# Patient Record
Sex: FEMALE | Race: WHITE | Employment: FULL TIME | URBAN - METROPOLITAN AREA
[De-identification: names, ages, dates, MRNs, and addresses within clinical notes are randomized per-mention and may not be internally consistent; named-entity substitution may affect disease eponyms.]

---

## 2017-03-01 ENCOUNTER — OFFICE VISIT (OUTPATIENT)
Dept: FAMILY MEDICINE | Facility: CLINIC | Age: 47
End: 2017-03-01
Payer: COMMERCIAL

## 2017-03-01 VITALS
SYSTOLIC BLOOD PRESSURE: 105 MMHG | TEMPERATURE: 98.4 F | DIASTOLIC BLOOD PRESSURE: 69 MMHG | OXYGEN SATURATION: 99 % | BODY MASS INDEX: 21.56 KG/M2 | HEART RATE: 88 BPM | WEIGHT: 133.6 LBS

## 2017-03-01 DIAGNOSIS — R07.0 THROAT PAIN: Primary | ICD-10-CM

## 2017-03-01 DIAGNOSIS — J06.9 VIRAL UPPER RESPIRATORY TRACT INFECTION: ICD-10-CM

## 2017-03-01 LAB
DEPRECATED S PYO AG THROAT QL EIA: NORMAL
MICRO REPORT STATUS: NORMAL
SPECIMEN SOURCE: NORMAL

## 2017-03-01 PROCEDURE — 99213 OFFICE O/P EST LOW 20 MIN: CPT | Performed by: PHYSICIAN ASSISTANT

## 2017-03-01 PROCEDURE — 87880 STREP A ASSAY W/OPTIC: CPT | Performed by: PHYSICIAN ASSISTANT

## 2017-03-01 PROCEDURE — 87081 CULTURE SCREEN ONLY: CPT | Performed by: PHYSICIAN ASSISTANT

## 2017-03-01 NOTE — PROGRESS NOTES
SUBJECTIVE:  Daphnie Yung is a 46 year old female who presents with the following concerns;              Symptoms: cc Present Absent Comment   Fever/Chills   x    Fatigue   x    Muscle Aches   x    Eye Irritation   x    Sneezing   x    Nasal Grzegorz/Drg  x     Sinus Pressure/Pain   x    Loss of smell   x    Dental pain   x    Sore Throat  x     Swollen Glands   x    Ear Pain/Fullness  x  Minimal pain left ear    Cough  x  At night    Wheeze   x    Chest Pain   x    Shortness of breath   x    Rash   x    Other   x      Symptom duration:  x10 days    Sympom severity:  moderate   Treatments tried:  claritin, tylenol   Contacts:  none        Medications updated and reviewed.  Past, family and surgical history is updated and reviewed in the record.    ROS:  Other than noted above, general, HEENT, respiratory, cardiac and gastrointestinal systems are negative.    OBJECTIVE:  /69  Pulse 88  Temp 98.4  F (36.9  C) (Oral)  Wt 133 lb 9.6 oz (60.6 kg)  SpO2 99%  BMI 21.56 kg/m2  GENERAL: Pleasant and interactive. No acute distress.  HEENT: Mild injection of conjunctiva. TMs clear. Oropharynx moist and clear.   NECK: supple and free of adenopathy or masses, the thyroid is normal without enlargement or nodules.  CHEST:  clear, no wheezing or rales. Normal symmetric air entry throughout both lung fields. No chest wall deformities or tenderness.  HEART:  S1 and S2 normal, no murmurs, clicks, gallops or rubs. Regular rate and rhythm.  SKIN:  Only benign skin findings. No unusual rashes or suspicious skin lesions noted. Nails appear normal.    RST - neg    Assessment:    Encounter Diagnoses   Name Primary?     Throat pain Yes     Viral upper respiratory tract infection      Plan:   Supportive therapy also discussed. Follow up if symptoms fail to improve or worsen.      The patient was in agreement with the plan today and had no questions or concerns prior to leaving the clinic.     Talia Alston PA-C

## 2017-03-01 NOTE — MR AVS SNAPSHOT
After Visit Summary   3/1/2017    Daphnie Yung    MRN: [id number]           Patient Information     Date Of Birth          1970        Visit Information        Provider Department      3/1/2017 9:20 AM Talia Alston PA-C [facility]        Today's Diagnoses     Throat pain    -  1      Care Instructions    Your strep test is negative.  Increase your water intake in order to keep the secretions/mucous in your upper respiratory tract thin. Get plenty of rest and wash your hands well. Follow up if symptoms fail to improve or worsen.   If your symptoms persist for 2 weeks or longer without improvements call me: [phone].         Follow-ups after your visit        Who to contact     Normal or non-critical lab and imaging results will be communicated to you by TapEng[employer]hart, letter or phone within 4 business days after the clinic has received the results. If you do not hear from us within 7 days, please contact the clinic through TapEngagehart or phone. If you have a critical or abnormal lab result, we will notify you by phone as soon as possible.  Submit refill requests through [employer] or call your pharmacy and they will forward the refill request to us. Please allow 3 business days for your refill to be completed.          If you need to speak with a  for additional information , please call: [phone]             Additional Information About Your Visit        Dada Information     Dada gives you secure access to your electronic health record. If you see a primary care provider, you can also send messages to your care team and make appointments. If you have questions, please call your primary care clinic.  If you do not have a primary care provider, please call [phone] and they will assist you.        Care EveryWhere ID     This is your Care EveryWhere ID. This could be used by other organizations to access your [place] medical records  [id number]         Your Vitals Were     Pulse Temperature Pulse Oximetry BMI (Body Mass Index)          88 98.4  F (36.9  C) (Oral) 99% 21.56 kg/m2         Blood Pressure from Last 3 Encounters:   03/01/17 105/69   12/08/16 104/66   11/29/16 108/68    Weight from Last 3 Encounters:   03/01/17 133 lb 9.6 oz (60.6 kg)   12/08/16 130 lb 6.4 oz (59.1 kg)   11/29/16 129 lb 9.6 oz (58.8 kg)              We Performed the Following     Beta strep group A culture     Strep, Rapid Screen        Primary Care Provider Office Phone # Fax #    Oneil Lynne [phone]       [address] [address]        Thank you!     Thank you for choosing [facility]  for your care. Our goal is always to provide you with excellent care. Hearing back from our patients is one way we can continue to improve our services. Please take a few minutes to complete the written survey that you may receive in the mail after your visit with us. Thank you!             Your Updated Medication List - Protect others around you: Learn how to safely use, store and throw away your medicines at www.disposemymeds.org.          This list is accurate as of: 3/1/17  9:32 AM.  Always use your most recent med list.                   Brand Name Dispense Instructions for use    rizatriptan 10 MG tablet    MAXALT     Reported on 3/1/2017

## 2017-03-01 NOTE — PATIENT INSTRUCTIONS
Your strep test is negative.  Increase your water intake in order to keep the secretions/mucous in your upper respiratory tract thin. Get plenty of rest and wash your hands well. Follow up if symptoms fail to improve or worsen.   If your symptoms persist for 2 weeks or longer without improvements call me: [phone].

## 2017-03-01 NOTE — NURSING NOTE
"Chief Complaint   Patient presents with     Pharyngitis       Initial /69  Pulse 88  Temp 98.4  F (36.9  C) (Oral)  Wt 133 lb 9.6 oz (60.6 kg)  SpO2 99%  BMI 21.56 kg/m2 Estimated body mass index is 21.56 kg/(m^2) as calculated from the following:    Height as of 11/23/16: 5' 6\" (1.676 m).    Weight as of this encounter: 133 lb 9.6 oz (60.6 kg).  Medication Reconciliation: complete   Bibi Ybarra MA      "

## 2017-03-03 LAB
BACTERIA SPEC CULT: NORMAL
MICRO REPORT STATUS: NORMAL
SPECIMEN SOURCE: NORMAL

## 2017-05-11 ENCOUNTER — OFFICE VISIT (OUTPATIENT)
Dept: FAMILY MEDICINE | Facility: CLINIC | Age: 47
End: 2017-05-11
Payer: COMMERCIAL

## 2017-05-11 VITALS
TEMPERATURE: 99.4 F | SYSTOLIC BLOOD PRESSURE: 105 MMHG | HEIGHT: 66 IN | WEIGHT: 136 LBS | BODY MASS INDEX: 21.86 KG/M2 | DIASTOLIC BLOOD PRESSURE: 63 MMHG | HEART RATE: 99 BPM

## 2017-05-11 DIAGNOSIS — L98.9 SKIN LESION: Primary | ICD-10-CM

## 2017-05-11 PROCEDURE — 99213 OFFICE O/P EST LOW 20 MIN: CPT | Performed by: NURSE PRACTITIONER

## 2017-05-11 NOTE — NURSING NOTE
"Chief Complaint   Patient presents with     Mass       Initial /63  Pulse 99  Temp 99.4  F (37.4  C) (Tympanic)  Ht 5' 6\" (1.676 m)  Wt 136 lb (61.7 kg)  LMP 04/22/2017 (Exact Date)  BMI 21.95 kg/m2 Estimated body mass index is 21.95 kg/(m^2) as calculated from the following:    Height as of this encounter: 5' 6\" (1.676 m).    Weight as of this encounter: 136 lb (61.7 kg).  Medication Reconciliation: complete   JANICE Olson      "

## 2017-05-11 NOTE — MR AVS SNAPSHOT
After Visit Summary   5/11/2017    Daphnie Yung    MRN: [id number]           Patient Information     Date Of Birth          1970        Visit Information        Provider Department      5/11/2017 8:20 AM Nurys Reilly APRN [facility]        Today's Diagnoses     Skin lesion    -  1      Care Instructions    For the skin lesion on the nose.   It looks like a new mole .   See either Derm or Plastics to have it removed     Sun screen !!!   Neutrogena facial sunscreen  has a very good.         Follow-ups after your visit        Additional Services     DERMATOLOGY REFERRAL       Your provider has referred you to: [facility] (2 locations) [phone]   [email or web address]    Please be aware that coverage of these services is subject to the terms and limitations of your health insurance plan.  Call member services at your health plan with any benefit or coverage questions.      Please bring the following with you to your appointment:    (1) Any X-Rays, CTs or MRIs which have been performed.  Contact the facility where they were done to arrange for  prior to your scheduled appointment.    (2) List of current medications  (3) This referral request   (4) Any documents/labs given to you for this referral            PLASTIC SURGERY REFERRAL       Your provider has referred you to: [facility]: [facility] [phone] [email or web address]: Plastic and Reconstructive Surgery [facility] [phone]   [email or web address]    Please be aware that coverage of these services is subject to the terms and limitations of your health insurance plan.  Call member services at your health plan with any benefit or coverage  "questions.      Please bring the following with you to your appointment:    (1) Any X-Rays, CTs or MRIs which have been performed.  Contact the facility where they were done to arrange for  prior to your scheduled appointment.    (2) List of current medications  (3) This referral request   (4) Any documents/labs given to you for this referral                  Who to contact     Normal or non-critical lab and imaging results will be communicated to you by [employer] [facility], letter or phone within 4 business days after the clinic has received the results. If you do not hear from us within 7 days, please contact the clinic through [employer] or phone. If you have a critical or abnormal lab result, we will notify you by phone as soon as possible.  Submit refill requests through [employer] or call your pharmacy and they will forward the refill request to us. Please allow 3 business days for your refill to be completed.          If you need to speak with a  for additional information , please call: [phone]           Additional Information About Your Visit        [employer] Information     [employer] gives you secure access to your electronic health record. If you see a primary care provider, you can also send messages to your care team and make appointments. If you have questions, please call your primary care clinic.  If you do not have a primary care provider, please call [phone] and they will assist you.        Care EveryWhere ID     This is your Care EveryWhere ID. This could be used by other organizations to access your [place] medical records  [id number]        Your Vitals Were     Pulse Temperature Height Last Period BMI (Body Mass Index)       99 99.4  F (37.4  C) (Tympanic) 5' 6\" (1.676 m) 04/22/2017 (Exact Date) 21.95 kg/m2        Blood Pressure from Last 3 Encounters:   05/11/17 105/63   03/01/17 105/69   12/08/16 104/66    Weight from Last 3 Encounters:   05/11/17 136 lb (61.7 kg)   03/01/17 133 " lb 9.6 oz (60.6 kg)   12/08/16 130 lb 6.4 oz (59.1 kg)              We Performed the Following     DERMATOLOGY REFERRAL     PLASTIC SURGERY REFERRAL        Primary Care Provider Office Phone # Fax #    Oneil Lynne [phone]       [facility] [address] [address]        Thank you!     Thank you for choosing [facility]  for your care. Our goal is always to provide you with excellent care. Hearing back from our patients is one way we can continue to improve our services. Please take a few minutes to complete the written survey that you may receive in the mail after your visit with us. Thank you!             Your Updated Medication List - Protect others around you: Learn how to safely use, store and throw away your medicines at www.disposemymeds.org.          This list is accurate as of: 5/11/17  9:00 AM.  Always use your most recent med list.                   Brand Name Dispense Instructions for use    rizatriptan 10 MG tablet    MAXALT     Reported on 5/11/2017

## 2017-05-11 NOTE — PROGRESS NOTES
SUBJECTIVE:                                                    Daphnie Yung is a 46 year old female who presents to clinic today for the following health issues:      Patient is in for a bump on her nose that has been there for 2 months. She had a similar bump on the back of her neck in November and was treated with Keflex and resolved the problem.   [place],CMA      Bump on the nose x 2 months will pinch it and will get some puss out but it will never go away    Did have another bump on November and was put on Keflex  ? If that is what she needs now.   The lesion on the nose doesn't hurt .  Was a big sun person younger not now     Problem list and histories reviewed & adjusted, as indicated.  Additional history: as documented    Patient Active Problem List   Diagnosis     Chronic rhinitis     Chronic headache disorder     Chronic diarrhea     Irritable bowel syndrome     Vegan diet     Migraine with aura and without status migrainosus, not intractable     Past Surgical History:   Procedure Laterality Date     TONSILLECTOMY & ADENOIDECTOMY         Social History   Substance Use Topics     Smoking status: Never Smoker     Smokeless tobacco: Never Used     Alcohol use No      Comment: rarly on weekends     Family History   Problem Relation Age of Onset     DIABETES Maternal Grandfather      Thyroid Disease Paternal Grandmother      Eye Disorder Paternal Grandmother      cadiract     Hypertension No family hx of      CEREBROVASCULAR DISEASE No family hx of      Breast Cancer No family hx of      Cancer - colorectal No family hx of      Prostate Cancer No family hx of          Current Outpatient Prescriptions   Medication Sig Dispense Refill     rizatriptan (MAXALT) 10 MG tablet Reported on 5/11/2017       Allergies   Allergen Reactions     Cats      Dogs      Dust Mites      Nkda [No Known Drug Allergies]      Seasonal Allergies      Trees      Tree mold     BP Readings from Last 3 Encounters:   05/11/17 105/63  "  03/01/17 105/69   12/08/16 104/66    Wt Readings from Last 3 Encounters:   05/11/17 136 lb (61.7 kg)   03/01/17 133 lb 9.6 oz (60.6 kg)   12/08/16 130 lb 6.4 oz (59.1 kg)                    Reviewed and updated as needed this visit by clinical staff  Tobacco  Allergies  Meds  Med Hx  Surg Hx  Fam Hx  Soc Hx      Reviewed and updated as needed this visit by Provider         ROS:  C: NEGATIVE for fever, chills, change in weight  INTEGUMENTARY/SKIN: POSITIVE for bump on the nose see notes above.    E/M: NEGATIVE for ear, mouth and throat problems  R: NEGATIVE for significant cough or SOB  CV: NEGATIVE for chest pain, palpitations or peripheral edema    OBJECTIVE:                                                    /63  Pulse 99  Temp 99.4  F (37.4  C) (Tympanic)  Ht 5' 6\" (1.676 m)  Wt 136 lb (61.7 kg)  LMP 04/22/2017 (Exact Date)  BMI 21.95 kg/m2  Body mass index is 21.95 kg/(m^2).  GENERAL: healthy, alert and no distress  SKIN: there is raised skin mole on the tip of her nose  Mildly elevated      Diagnostic Test Results:  none      ASSESSMENT/PLAN:                                                      ASSESSMENT/PLAN:      ICD-10-CM    1. Skin lesion L98.9 DERMATOLOGY REFERRAL     PLASTIC SURGERY REFERRAL       Patient Instructions   For the skin lesion on the nose.   It looks like a new mole .   See either Derm or Plastics to have it removed     Sun screen !!!   Neutrogena facial sunscreen  has a very good.                  CONSULTATION/REFERRAL to derm    ELFEGO PATINO NP, APRN [facility]    "

## 2017-05-11 NOTE — PATIENT INSTRUCTIONS
For the skin lesion on the nose.   It looks like a new mole .   See either Derm or Plastics to have it removed     Sun screen !!!   Neutrogena facial sunscreen  has a very good.

## 2017-09-03 ENCOUNTER — HEALTH MAINTENANCE LETTER (OUTPATIENT)
Age: 47
End: 2017-09-03

## 2017-10-06 ENCOUNTER — ALLIED HEALTH/NURSE VISIT (OUTPATIENT)
Dept: FAMILY MEDICINE | Facility: CLINIC | Age: 47
End: 2017-10-06
Payer: COMMERCIAL

## 2017-10-06 DIAGNOSIS — Z23 NEED FOR PROPHYLACTIC VACCINATION AND INOCULATION AGAINST INFLUENZA: Primary | ICD-10-CM

## 2017-10-06 PROCEDURE — 99207 ZZC NO CHARGE NURSE ONLY: CPT

## 2017-10-06 PROCEDURE — 90471 IMMUNIZATION ADMIN: CPT

## 2017-10-06 PROCEDURE — 90686 IIV4 VACC NO PRSV 0.5 ML IM: CPT

## 2017-10-06 NOTE — MR AVS SNAPSHOT
After Visit Summary   10/6/2017    Daphnie Yung    MRN: [id number]           Patient Information     Date Of Birth          1970        Visit Information        Provider Department      10/6/2017 11:30 AM Rhianna/Lpn, Fl [facility]        Today's Diagnoses     Need for prophylactic vaccination and inoculation against influenza    -  1       Follow-ups after your visit        Who to contact     Normal or non-critical lab and imaging results will be communicated to you by [employer], letter or phone within 4 business days after the clinic has received the results. If you do not hear from us within 7 days, please contact the clinic through [employer] or phone. If you have a critical or abnormal lab result, we will notify you by phone as soon as possible.  Submit refill requests through [employer] or call your pharmacy and they will forward the refill request to us. Please allow 3 business days for your refill to be completed.          If you need to speak with a  for additional information , please call: [phone]           Additional Information About Your Visit        [employer] Information     [employer] gives you secure access to your electronic health record. If you see a primary care provider, you can also send messages to your care team and make appointments. If you have questions, please call your primary care clinic.  If you do not have a primary care provider, please call [phone] and they will assist you.        Care EveryWhere ID     This is your Care EveryWhere ID. This could be used by other organizations to access your [place] medical records  [id number]         Blood Pressure from Last 3 Encounters:   05/11/17 105/63   03/01/17 105/69   12/08/16 104/66    Weight from Last 3 Encounters:   05/11/17 136 lb (61.7 kg)   03/01/17 133 lb 9.6 oz (60.6 kg)   12/08/16 130 lb 6.4 oz (59.1 kg)              We Performed the Following     FLU VAC, SPLIT VIRUS IM > 3 YO  (QUADRIVALENT) [[id number]]     Vaccine Administration, Initial [[id number]]        Primary Care Provider Office Phone # Fax #    Oneil Lynne [phone] [address] [address]        Equal Access to Services     FILEMON NAGEL : Hadii jodie ku hadjose juano Soomaali, waaxda luqadaha, qaybta kaalmada adeegyada, waxay idiin hayaan ademookie rubenaleida luna. So [facility] [place] [phone].    ATENCIÓN: Si habla español, tiene a jarrell disposición servicios gratuitos de asistencia lingüística. Llame al [phone].    We comply with applicable federal civil rights laws and [place] laws. We do not discriminate on the basis of race, color, national origin, age, disability, sex, sexual orientation, or gender identity.            Thank you!     Thank you for choosing [facility]  for your care. Our goal is always to provide you with excellent care. Hearing back from our patients is one way we can continue to improve our services. Please take a few minutes to complete the written survey that you may receive in the mail after your visit with us. Thank you!             Your Updated Medication List - Protect others around you: Learn how to safely use, store and throw away your medicines at www.disposemymeds.org.          This list is accurate as of: 10/6/17 11:38 AM.  Always use your most recent med list.                   Brand Name Dispense Instructions for use Diagnosis    rizatriptan 10 MG tablet    MAXALT     Reported on 5/11/2017

## 2017-10-06 NOTE — PROGRESS NOTES
Injectable Influenza Immunization Documentation    1.  Is the person to be vaccinated sick today?   No    2. Does the person to be vaccinated have an allergy to a component   of the vaccine?   No    3. Has the person to be vaccinated ever had a serious reaction   to influenza vaccine in the past?   No    4. Has the person to be vaccinated ever had Guillain-Barré syndrome?   No    Form completed by Jackie Broussard / Certified Medical Assistant......10/6/2017 11:37 AM

## 2017-12-21 ENCOUNTER — TRANSFERRED RECORDS (OUTPATIENT)
Dept: HEALTH INFORMATION MANAGEMENT | Facility: CLINIC | Age: 47
End: 2017-12-21

## 2017-12-21 LAB — PAP SMEAR - HIM PATIENT REPORTED: NEGATIVE

## 2018-08-14 ENCOUNTER — TRANSFERRED RECORDS (OUTPATIENT)
Dept: HEALTH INFORMATION MANAGEMENT | Facility: CLINIC | Age: 48
End: 2018-08-14

## 2018-10-23 ENCOUNTER — OFFICE VISIT (OUTPATIENT)
Dept: FAMILY MEDICINE | Facility: CLINIC | Age: 48
End: 2018-10-23
Payer: COMMERCIAL

## 2018-10-23 VITALS
WEIGHT: 139.5 LBS | DIASTOLIC BLOOD PRESSURE: 72 MMHG | TEMPERATURE: 98.6 F | HEIGHT: 66 IN | HEART RATE: 92 BPM | SYSTOLIC BLOOD PRESSURE: 116 MMHG | BODY MASS INDEX: 22.42 KG/M2

## 2018-10-23 DIAGNOSIS — K12.0 CANKER SORES ORAL: Primary | ICD-10-CM

## 2018-10-23 PROCEDURE — 99213 OFFICE O/P EST LOW 20 MIN: CPT | Performed by: FAMILY MEDICINE

## 2018-10-23 RX ORDER — TRIAMCINOLONE ACETONIDE 0.1 %
PASTE (GRAM) DENTAL 2 TIMES DAILY
Qty: 5 G | Refills: 0 | Status: SHIPPED | OUTPATIENT
Start: 2018-10-23 | End: 2019-04-09

## 2018-10-23 NOTE — PROGRESS NOTES
"  SUBJECTIVE:   Daphnie Yung is a 48 year old female who presents to clinic today for the following health issues:      Gets canker sores frequently   New one in the posterior throat yesterday and it was worsened last night     she has an amalgam tattoo from a dental injury in the same area as her ulcer. It has been there for years.     Triggers for ulcers:   Fresh and acidy foods   Lots of stress in her life     Review of systems:  No f/c   No cold symptoms  No neck pain  No cough    Problem list and histories reviewed & adjusted, as indicated.  Additional history: as documented      Current Outpatient Prescriptions   Medication     Ibuprofen (ADVIL PO)     rizatriptan (MAXALT) 10 MG tablet     No current facility-administered medications for this visit.      Patient Active Problem List   Diagnosis     Chronic rhinitis     Chronic headache disorder     Chronic diarrhea     Irritable bowel syndrome     Vegan diet     Migraine with aura and without status migrainosus, not intractable        Allergies   Allergen Reactions     Cats      Dogs      Dust Mites      Nkda [No Known Drug Allergies]      Seasonal Allergies      Trees      Tree mold         /72 (BP Location: Right arm, Patient Position: Sitting, Cuff Size: Adult Regular)  Pulse 92  Temp 98.6  F (37  C) (Tympanic)  Ht 5' 5.75\" (1.67 m)  Wt 139 lb 8 oz (63.3 kg)  BMI 22.69 kg/m2  GENERAL - Pt is alert and oriented in no acute distress.  Affect is appropriate. Good eye contact.  HEET - Head is normocephalic, atraumatic.    PERRLA,EEMI. Conjunctiva are free of icterus or erythema.    TMs bilaterally normal.    Oropharynx - shallow ulceration in the left posterior throat, measuring approximately 1cm in diameter. There is some dark discoloration to the base which patient reports as amalgam tattoo   No other lesions, no tonsillar exudate or petechiae.    NECK - Neck is supple w/o LA or thyromegaly      Assessment/Plan -  (K12.0) Canker sores oral  (primary " encounter diagnosis)  Comment: Discussed trial of kenalog for her intermittent oral ulcers. Swish with warm salt water. The patient indicates understanding of these issues and agrees with the plan.   Plan: triamcinolone (KENALOG) 0.1 % paste    Will come back for flu shot           C. Zaynab Purdy MD

## 2018-10-23 NOTE — MR AVS SNAPSHOT
"              After Visit Summary   10/23/2018    Daphnie Yung    MRN: [id number]           Patient Information     Date Of Birth          1970        Visit Information        Provider Department      10/23/2018 2:30 PM Leigh Ann Purdy MD [facility]        Today's Diagnoses     Canker sores oral    -  1       Follow-ups after your visit        Who to contact     Normal or non-critical lab and imaging results will be communicated to you by [employer], letter or phone within 4 business days after the clinic has received the results. If you do not hear from us within 7 days, please contact the clinic through [employer] or phone. If you have a critical or abnormal lab result, we will notify you by phone as soon as possible.  Submit refill requests through [employer] or call your pharmacy and they will forward the refill request to us. Please allow 3 business days for your refill to be completed.          If you need to speak with a  for additional information , please call: [phone]             Additional Information About Your Visit        [employer] Information     [employer] gives you secure access to your electronic health record. If you see a primary care provider, you can also send messages to your care team and make appointments. If you have questions, please call your primary care clinic.  If you do not have a primary care provider, please call [phone] and they will assist you.        Care EveryWhere ID     This is your Care EveryWhere ID. This could be used by other organizations to access your [place] medical records  [id number]        Your Vitals Were     Pulse Temperature Height BMI (Body Mass Index)          92 98.6  F (37  C) (Tympanic) 5' 5.75\" (1.67 m) 22.69 kg/m2         Blood Pressure from Last 3 Encounters:   10/23/18 116/72   05/11/17 105/63   03/01/17 105/69    Weight from Last 3 Encounters:   10/23/18 139 lb 8 oz (63.3 kg)   05/11/17 136 lb (61.7 kg)   03/01/17 " 133 lb 9.6 oz (60.6 kg)              Today, you had the following     No orders found for display         Today's Medication Changes          These changes are accurate as of 10/23/18  4:33 PM.  If you have any questions, ask your nurse or doctor.               Start taking these medicines.        Dose/Directions    triamcinolone 0.1 % paste   Commonly known as:  KENALOG   Used for:  Canker sores oral   Started by:  Leigh Ann Purdy MD        Take by mouth 2 times daily Picking it up right away   Quantity:  5 g   Refills:  0            Where to get your medicines      These medications were sent to [facility] [address] [facility]  [address] [facility] [address]     Phone:  [phone]     triamcinolone 0.1 % paste                Primary Care Provider Office Phone # Fax #    [facility] [phone] [address] [facility] [address]        Equal Access to Services     FILEMON NAGEL AH: Hadii aad ku hadasho Soomaali, waaxda luqadaha, qaybta kaalmada adeegyada, waxay idiin hayaan larissa kharash juanito . So [facility] [phone].    ATENCIÓN: Si stephanie martinez, tiene a jarrell disposición servicios gratuitos de asistencia lingüística. Llame al [phone].    We comply with applicable federal civil rights laws and [place] laws. We do not discriminate on the basis of race, color, national origin, age, disability, sex, sexual orientation, or gender identity.            Thank you!     Thank you for choosing [facility]  for your care. Our goal is always to provide you with excellent care. Hearing back from our patients is one way we can continue to improve our services. Please take a few minutes to complete the written survey that you may receive in the mail after your visit with us. Thank you!             Your Updated Medication List - Protect others around you: Learn how to safely use, store and throw away your medicines at www.disposemymeds.org.           This list is accurate as of 10/23/18  4:33 PM.  Always use your most recent med list.                   Brand Name Dispense Instructions for use Diagnosis    ADVIL PO           rizatriptan 10 MG tablet    MAXALT     Reported on 5/11/2017        triamcinolone 0.1 % paste    KENALOG    5 g    Take by mouth 2 times daily Picking it up right away    Canker sores oral

## 2018-11-10 ENCOUNTER — NURSE TRIAGE (OUTPATIENT)
Dept: NURSING | Facility: CLINIC | Age: 48
End: 2018-11-10

## 2018-11-11 ENCOUNTER — RADIANT APPOINTMENT (OUTPATIENT)
Dept: GENERAL RADIOLOGY | Facility: CLINIC | Age: 48
End: 2018-11-11
Attending: FAMILY MEDICINE
Payer: COMMERCIAL

## 2018-11-11 ENCOUNTER — OFFICE VISIT (OUTPATIENT)
Dept: URGENT CARE | Facility: URGENT CARE | Age: 48
End: 2018-11-11
Payer: COMMERCIAL

## 2018-11-11 VITALS
TEMPERATURE: 97 F | SYSTOLIC BLOOD PRESSURE: 126 MMHG | WEIGHT: 139.2 LBS | HEART RATE: 82 BPM | BODY MASS INDEX: 22.64 KG/M2 | OXYGEN SATURATION: 100 % | DIASTOLIC BLOOD PRESSURE: 82 MMHG

## 2018-11-11 DIAGNOSIS — S62.501A CLOSED NONDISPLACED FRACTURE OF PHALANX OF RIGHT THUMB, UNSPECIFIED PHALANX, INITIAL ENCOUNTER: ICD-10-CM

## 2018-11-11 DIAGNOSIS — S67.01XA CRUSH INJURY TO THUMB, RIGHT, INITIAL ENCOUNTER: Primary | ICD-10-CM

## 2018-11-11 PROCEDURE — 73140 X-RAY EXAM OF FINGER(S): CPT | Mod: RT

## 2018-11-11 PROCEDURE — 99213 OFFICE O/P EST LOW 20 MIN: CPT | Performed by: FAMILY MEDICINE

## 2018-11-11 RX ORDER — CEPHALEXIN 500 MG/1
500 CAPSULE ORAL 3 TIMES DAILY
Qty: 21 CAPSULE | Refills: 0 | Status: SHIPPED | OUTPATIENT
Start: 2018-11-11 | End: 2019-04-09

## 2018-11-11 NOTE — PROGRESS NOTES
SUBJECTIVE:  Daphnie Yung, a 48 year old female scheduled an appointment to discuss the following issues:     Crush injury to thumb, right, initial encounter  Closed nondisplaced fracture of phalanx of right thumb, unspecified phalanx, initial encounter    Medical, social, surgical, and family histories reviewed.     Musculoskeletal Problem  Patient slammed her right thumb in her car door yesterday.       As above. No head/neck/trunk injuries.  Slight bleeding from under the nail initially which has stopped.  No open wound.  Tetanus UTD.  ++ bruising of distal right thumb but no loss of function.  No paresthesia.    ROS:  See HPI.  No nausea/vomiting.  No fever/chills.  No chest pain/SOB.  No BM/urine problems.  No dizziness or syncope.      OBJECTIVE:  /82  Pulse 82  Temp 97  F (36.1  C) (Oral)  Wt 139 lb 3.2 oz (63.1 kg)  SpO2 100%  BMI 22.64 kg/m2  EXAM:  GENERAL APPEARANCE: alert and no distress  EYES: Eyes grossly normal to inspection, PERRL and conjunctivae and sclerae normal  HENT: ear canals and TM's normal and nose and mouth without ulcers or lesions  NECK: no adenopathy, no asymmetry, masses, or scars and thyroid normal to palpation  RESP: lungs clear to auscultation - no rales, rhonchi or wheezes  CV: regular rates and rhythm, normal S1 S2, no S3 or S4 and no murmur, click or rub  MS & SKIN: extremities --- no gross deformities noted.  Bruised volar aspect of right distal thumb with mild swelling.  Slight sub-ungal hematoma (not enough for drainage with cautery); old blood on dorsum distal thumb which wash off with running water and mild soap.  ROM right thumb intact.  Rest of hands and wrists, elbows and shoulders exam normal.  Neurovascularly intact bilateral upper and lower extremities  NEURO: Normal strength and tone, mentation intact and speech normal    ASSESSMENT/PLAN:  (S67.01XA) Crush injury to thumb, right, initial encounter  (primary encounter diagnosis)  Comment: xray right thumb  showed Minimally displaced tuft fracture is present. Mild soft  tissue swelling.  Plan: XR Finger Right G/E 2 Views           (V13.333A) Closed nondisplaced fracture of phalanx of right thumb, unspecified phalanx, initial encounter  Plan: cephALEXin (KEFLEX) 500 MG capsule (as infection prophylaxis as this is technically an open fracture)   Metal Splint applied with dry sterile cling dressing.  Care instructions given  Pt to f/up PCP in 2 weeks for recheck, sooner if no improvement or worsening.  Warning signs and symptoms explained.

## 2018-11-11 NOTE — MR AVS SNAPSHOT
After Visit Summary   11/11/2018    Daphnie Yung    MRN: [id number]           Patient Information     Date Of Birth          1970        Visit Information        Provider Department      11/11/2018 9:10 AM Matt Yeh MD [facility]        Today's Diagnoses     Crush injury to thumb, right, initial encounter    -  1    Closed nondisplaced fracture of phalanx of right thumb, unspecified phalanx, initial encounter          Care Instructions      Closed Thumb Fracture  You have a broken (fractured) thumb. This causes local pain, swelling, and often bruising. This injury will usually take about 4 to 6 weeks or longer to heal. Thumb fractures may be treated with a splint or cast. This protects the thumb and holds the bone in place while it heals. More serious fractures may need surgery.     If the thumbnail has been severely injured, it may fall off in 1 to 2 weeks. A new thumbnail will usually start to grow back within a month.  Home care  Follow these guidelines when caring for yourself at home:    Keep your arm elevated to reduce pain and swelling. When sitting or lying down elevate your arm above the level of your heart. You can do this by placing your arm on a pillow that rests on your chest or on a pillow at your side. This is most important during the first 2 days (48 hours) after the injury.    Put an ice pack on the injured area. Do this for 20 minutes every 1 to 2 hours the first day for pain relief. You can make an ice pack by wrapping a plastic bag of ice cubes in a thin towel. As the ice melts, be careful that the cast or splint doesn t get wet. Continue using the ice pack 3 to 4 times a day for the next 2 days. Then use the ice pack as needed to ease pain and swelling.    If a splint was put on, leave this in place for the time advised. This will keep the bones from moving out of position.    Keep the cast or splint completely dry at all times. Bathe with your cast or  splint out of the water. Protect it with a large plastic bag, rubber-banded at the top end. If a fiberglass cast or splint gets wet, you can dry it with a hair dryer.    You may use acetaminophen or ibuprofen to control pain, unless another pain medicine was prescribed. If you have chronic liver or kidney disease, talk with your healthcare provider before using these medicines. Also talk with your provider if you ve had a stomach ulcer or gastrointestinal bleeding.    Don t put creams or objects under the cast if you have itching.  Follow-up care  Follow up with your healthcare provider in 1 week, or as advised. This is to make sure the bone is healing the way it should. Talk with your provider about when it is safe to return to sports or work.  X-rays may be taken. You will be told of any new findings that may affect your care.  When to seek medical advice  Call your healthcare provider right away if any of these occur:    The cast or splint cracks    The plaster cast or splint becomes wet or soft    The fiberglass cast or splint stays wet for more than 24 hours    Bad odor from the cast or wound fluid stains the cast    Pain or swelling gets worse    Redness or warmth in the hand    Fingers or hand become cold, blue, numb, or tingly    You can t move your hand or fingers    Skin around cast or splint becomes red    Fever of 100.4 F (38 C) or higher, or as directed by your healthcare provider  Date Last Reviewed: 2/1/2017 2000-2018 [employer]. [address] [place] [address]. All rights reserved. This information is not intended as a substitute for professional medical care. Always follow your healthcare professional's instructions.                Follow-ups after your visit        Who to contact     If you have questions or need follow up information about today's clinic visit or your schedule please contact [facility] directly at [phone].  Normal or non-critical lab  and imaging results will be communicated to you by [employer], letter or phone within 4 business days after the clinic has received the results. If you do not hear from us within 7 days, please contact the clinic through [employer] or phone. If you have a critical or abnormal lab result, we will notify you by phone as soon as possible.  Submit refill requests through [employer] or call your pharmacy and they will forward the refill request to us. Please allow 3 business days for your refill to be completed.          Additional Information About Your Visit        [email or web address] Information     [employer] gives you secure access to your electronic health record. If you see a primary care provider, you can also send messages to your care team and make appointments. If you have questions, please call your primary care clinic.  If you do not have a primary care provider, please call [phone] and they will assist you.        Care EveryWhere ID     This is your Care EveryWhere ID. This could be used by other organizations to access your [facility] medical records  [id number]        Your Vitals Were     Pulse Temperature Pulse Oximetry BMI (Body Mass Index)          82 97  F (36.1  C) (Oral) 100% 22.64 kg/m2         Blood Pressure from Last 3 Encounters:   11/11/18 126/82   10/23/18 116/72   05/11/17 105/63    Weight from Last 3 Encounters:   11/11/18 139 lb 3.2 oz (63.1 kg)   10/23/18 139 lb 8 oz (63.3 kg)   05/11/17 136 lb (61.7 kg)              We Performed the Following     XR Finger Right G/E 2 Views          Today's Medication Changes          These changes are accurate as of 11/11/18 10:21 AM.  If you have any questions, ask your nurse or doctor.               Start taking these medicines.        Dose/Directions    cephALEXin 500 MG capsule   Commonly known as:  KEFLEX   Used for:  Closed nondisplaced fracture of phalanx of right thumb, unspecified phalanx, initial encounter   Started by:  Matt Yeh MD        Dose:  500 mg    Take 1 capsule (500 mg) by mouth 3 times daily for 7 days   Quantity:  21 capsule   Refills:  0            Where to get your medicines      These medications were sent to [facility] [address] [facility] [address] [facility] [place] [address] [address], [place] [address]     Phone:  [phone]     cephALEXin 500 MG capsule                Primary Care Provider Office Phone # Fax #    [facility] [phone] [address] [facility] [address]        Equal Access to Services     [facility]: Hadii aad ku hadasho Soomaali, waaxda luqadaha, qaybta kaalmada adeegyada, waxay idiin hayaan adeeg kandi solomon . So [facility] [phone].    ATENCIÓN: Si habla español, tiene a jarrell disposición servicios gratuitos de asistencia lingüística. [facility] [phone].    We comply with applicable federal civil rights laws and [place] laws. We do not discriminate on the basis of race, color, national origin, age, disability, sex, sexual orientation, or gender identity.            Thank you!     Thank you for choosing [facility]  for your care. Our goal is always to provide you with excellent care. Hearing back from our patients is one way we can continue to improve our services. Please take a few minutes to complete the written survey that you may receive in the mail after your visit with us. Thank you!             Your Updated Medication List - Protect others around you: Learn how to safely use, store and throw away your medicines at www.disposemymeds.org.          This list is accurate as of 11/11/18 10:21 AM.  Always use your most recent med list.                   Brand Name Dispense Instructions for use Diagnosis    ADVIL PO           cephALEXin 500 MG capsule    KEFLEX    21 capsule    Take 1 capsule (500 mg) by mouth 3 times daily for 7 days    Closed nondisplaced fracture of phalanx of right thumb, unspecified phalanx,  initial encounter       rizatriptan 10 MG tablet    MAXALT     Reported on 5/11/2017        triamcinolone 0.1 % paste    KENALOG    5 g    Take by mouth 2 times daily Picking it up right away    Canker sores oral

## 2018-11-11 NOTE — TELEPHONE ENCOUNTER
Pt reports slamming her thumb in the car door this afternoon.  The nail is torn apart from the skin.  Pt currently has it wrapped.      Disposition:  ED.  Pt verbalized understanding and had no further questions.     Leilani Mckinney RN/FNA    Reason for Disposition    [1] Fingernail is partially torn AND [2] from crush injury  (Exception: torn nail from catching it on something)    Additional Information    Negative: [1] Major bleeding (e.g., spurting blood) AND [2] can't be stopped    Negative: Wound looks infected    Negative: Caused by animal bite    Negative: Caused by human bite    Negative: Amputated finger    Negative: Skin is split open or gaping  (or length > 1/2 inch or 12 mm)    Negative: [1] Bleeding AND [2] won't stop after 10 minutes of direct pressure (using correct technique)    Negative: [1] Dirt in the wound AND [2] not removed with 15 minutes of scrubbing    Negative: High pressure injection injury (e.g., from grease gun or paint gun, usually work-related)    Negative: Looks like a broken bone (e.g., crooked or deformed)    Negative: Looks like a dislocated joint (e.g., crooked or deformed)    Protocols used: FINGER INJURY-ADULT-

## 2018-11-11 NOTE — PATIENT INSTRUCTIONS
Closed Thumb Fracture  You have a broken (fractured) thumb. This causes local pain, swelling, and often bruising. This injury will usually take about 4 to 6 weeks or longer to heal. Thumb fractures may be treated with a splint or cast. This protects the thumb and holds the bone in place while it heals. More serious fractures may need surgery.     If the thumbnail has been severely injured, it may fall off in 1 to 2 weeks. A new thumbnail will usually start to grow back within a month.  Home care  Follow these guidelines when caring for yourself at home:    Keep your arm elevated to reduce pain and swelling. When sitting or lying down elevate your arm above the level of your heart. You can do this by placing your arm on a pillow that rests on your chest or on a pillow at your side. This is most important during the first 2 days (48 hours) after the injury.    Put an ice pack on the injured area. Do this for 20 minutes every 1 to 2 hours the first day for pain relief. You can make an ice pack by wrapping a plastic bag of ice cubes in a thin towel. As the ice melts, be careful that the cast or splint doesn t get wet. Continue using the ice pack 3 to 4 times a day for the next 2 days. Then use the ice pack as needed to ease pain and swelling.    If a splint was put on, leave this in place for the time advised. This will keep the bones from moving out of position.    Keep the cast or splint completely dry at all times. Bathe with your cast or splint out of the water. Protect it with a large plastic bag, rubber-banded at the top end. If a fiberglass cast or splint gets wet, you can dry it with a hair dryer.    You may use acetaminophen or ibuprofen to control pain, unless another pain medicine was prescribed. If you have chronic liver or kidney disease, talk with your healthcare provider before using these medicines. Also talk with your provider if you ve had a stomach ulcer or gastrointestinal bleeding.    Don t put  creams or objects under the cast if you have itching.  Follow-up care  Follow up with your healthcare provider in 1 week, or as advised. This is to make sure the bone is healing the way it should. Talk with your provider about when it is safe to return to sports or work.  X-rays may be taken. You will be told of any new findings that may affect your care.  When to seek medical advice  Call your healthcare provider right away if any of these occur:    The cast or splint cracks    The plaster cast or splint becomes wet or soft    The fiberglass cast or splint stays wet for more than 24 hours    Bad odor from the cast or wound fluid stains the cast    Pain or swelling gets worse    Redness or warmth in the hand    Fingers or hand become cold, blue, numb, or tingly    You can t move your hand or fingers    Skin around cast or splint becomes red    Fever of 100.4 F (38 C) or higher, or as directed by your healthcare provider  Date Last Reviewed: 2/1/2017 2000-2018 [employer]. [address] [place] [address]. All rights reserved. This information is not intended as a substitute for professional medical care. Always follow your healthcare professional's instructions.

## 2018-11-21 ENCOUNTER — TRANSFERRED RECORDS (OUTPATIENT)
Dept: HEALTH INFORMATION MANAGEMENT | Facility: CLINIC | Age: 48
End: 2018-11-21

## 2018-11-21 ENCOUNTER — OFFICE VISIT (OUTPATIENT)
Dept: URGENT CARE | Facility: URGENT CARE | Age: 48
End: 2018-11-21
Payer: COMMERCIAL

## 2018-11-21 VITALS
SYSTOLIC BLOOD PRESSURE: 120 MMHG | OXYGEN SATURATION: 99 % | WEIGHT: 136 LBS | RESPIRATION RATE: 16 BRPM | BODY MASS INDEX: 22.12 KG/M2 | HEART RATE: 80 BPM | TEMPERATURE: 98.7 F | DIASTOLIC BLOOD PRESSURE: 71 MMHG

## 2018-11-21 DIAGNOSIS — R07.9 ACUTE CHEST PAIN: Primary | ICD-10-CM

## 2018-11-21 PROCEDURE — 99207 ZZC FOR CODING REVIEW: CPT | Performed by: FAMILY MEDICINE

## 2018-11-21 PROCEDURE — 93000 ELECTROCARDIOGRAM COMPLETE: CPT | Performed by: FAMILY MEDICINE

## 2018-11-21 PROCEDURE — 99215 OFFICE O/P EST HI 40 MIN: CPT | Performed by: FAMILY MEDICINE

## 2018-11-21 ASSESSMENT — PAIN SCALES - GENERAL: PAINLEVEL: MILD PAIN (3)

## 2018-11-21 NOTE — MR AVS SNAPSHOT
After Visit Summary   11/21/2018    Daphnie Yung    MRN: [id number]           Patient Information     Date Of Birth          1970        Visit Information        Provider Department      11/21/2018 7:15 PM Irlanda Woodson MD [facility]        Today's Diagnoses     Acute chest pain    -  1      Care Instructions    Patient with chest tightness and palpitations  Recommend ER evaluation for complete cardiorespiratory rule out.           Follow-ups after your visit        Who to contact     If you have questions or need follow up information about today's clinic visit or your schedule please contact [facility] directly at [phone].  Normal or non-critical lab and imaging results will be communicated to you by MyChart, letter or phone within 4 business days after the clinic has received the results. If you do not hear from us within 7 days, please contact the clinic through [employer] or phone. If you have a critical or abnormal lab result, we will notify you by phone as soon as possible.  Submit refill requests through [employer] or call your pharmacy and they will forward the refill request to us. Please allow 3 business days for your refill to be completed.          Additional Information About Your Visit        MyChart Information     [employer] gives you secure access to your electronic health record. If you see a primary care provider, you can also send messages to your care team and make appointments. If you have questions, please call your primary care clinic.  If you do not have a primary care provider, please call [phone] and they will assist you.        Care EveryWhere ID     This is your Care EveryWhere ID. This could be used by other organizations to access your [place] medical records  [id number]        Your Vitals Were     Pulse Temperature Respirations Pulse Oximetry Breastfeeding? BMI (Body Mass Index)    80 98.7  F (37.1  C) (Oral) 16 99% No  22.12 kg/m2       Blood Pressure from Last 3 Encounters:   11/21/18 120/71   11/11/18 126/82   10/23/18 116/72    Weight from Last 3 Encounters:   11/21/18 136 lb (61.7 kg)   11/11/18 139 lb 3.2 oz (63.1 kg)   10/23/18 139 lb 8 oz (63.3 kg)              We Performed the Following     EKG 12-lead complete w/read - Clinics        Primary Care Provider Office Phone # Fax #    [facility] [phone] [address] [facility] [address]        Equal Access to Services     ZAKIYA NAGEL : Hadii jodie pandyao Sodemetrius, waaxda luqadaha, qaybta kaalmada ademookieyada, ana m solomon . So [facility] [phone].    ATENCIÓN: Si habla español, tiene a jarrell disposición servicios gratuitos de asistencia lingüística. [facility] [phone].    We comply with applicable federal civil rights laws and [place] laws. We do not discriminate on the basis of race, color, national origin, age, disability, sex, sexual orientation, or gender identity.            Thank you!     [facility] you [facility]. Our goal is always to provide you with excellent care. Hearing back from our patients is one way we can continue to improve our services. Please take a few minutes to complete the written survey that you may receive in the mail after your visit with us. Thank you!             Your Updated Medication List - Protect others around you: Learn how to safely use, store and throw away your medicines at www.disposemymeds.org.          This list is accurate as of 11/21/18  7:52 PM.  Always use your most recent med list.                   Brand Name Dispense Instructions for use Diagnosis    ADVIL PO           rizatriptan 10 MG tablet    MAXALT     Reported on 5/11/2017        triamcinolone 0.1 % paste    KENALOG    5 g    Take by mouth 2 times daily Picking it up right away    Canker sores oral

## 2018-11-22 NOTE — NURSING NOTE
"Chief Complaint   Patient presents with     Chest Pain     pt c/o increased heart rate since Monday, chest pain that night and tightness untill today.       Initial /71  Pulse 80  Temp 98.7  F (37.1  C) (Oral)  Resp 16  Wt 136 lb (61.7 kg)  SpO2 99%  Breastfeeding? No  BMI 22.12 kg/m2 Estimated body mass index is 22.12 kg/(m^2) as calculated from the following:    Height as of 10/23/18: 5' 5.75\" (1.67 m).    Weight as of this encounter: 136 lb (61.7 kg).  Medication Reconciliation: complete  Gaby Zhu MA    "

## 2018-11-22 NOTE — PROGRESS NOTES
Chief complaint: chest pain    4 days ago had a migraine and took one maxalt    Been through a lot of stress a month  Elderly parents dad broke a pelvis  Mom has lost memory  2 days ago had a meeting with family   Patient became very upset  And since then has had chest tightness and racing    Patient has anxiety  No thoughts of harming self or others     Yesterday tried staying calm   Had some lightheadedness  Still had some chest tightness    Last night slept pretty good. Thought she was getting better    However seemed tight again.  Was just home watching grandson  Heart racing   Patient took her pulse and she got 107 beats     Worse with exertion relieved by rest: no  Worse with certain movements or position: YES- seems to be related to some chest tightness with some tension in the neck and back which she has tension   Associated with food intake or symptoms of GERD: no  Worse with taking in a deep breath: no  Cough/colds or respiratory symptoms: no  Signs or symptoms of DVT no    PERC Rule     Age <50 years   Heart rate <100 beats/minute   Oxyhemoglobin saturation ?95 percent   No hemoptysis   No estrogen use   No prior DVT or PE   No unilateral leg swelling   No surgery/trauma requiring hospitalization within the prior four weeks      No family history of blood clots    Nonsmoker no cholesterol problems  No early CAD     Problem list, Medication list, Allergies, and Medical/Social/Surgical histories reviewed in [employer] and updated as appropriate.      ROS:  General: negative for fever  Resp: chest pain as above  CV: + as above  ABD: Negative for abd pain.  Neurologic:negative for headache  10 point review of systems negative except for noted above.    OBJECTIVE:  /71  Pulse 80  Temp 98.7  F (37.1  C) (Oral)  Resp 16  Wt 136 lb (61.7 kg)  SpO2 99%  Breastfeeding? No  BMI 22.12 kg/m2   General:   awake, alert, and cooperative.  NAD.   Head: Normocephalic, atraumatic.  Eyes: Conjunctiva clear,   ENT:  normal exam  Heart: Regular rate and rhythm. No murmur.  Lungs: Chest is clear; no wheezes or rales.   Abdomen: soft nontender  Neuro: Alert and oriented - normal speech. No gross neurologic deficits  Psych: Appropriate mood and affect. No thoughts of harming self or others   Skin: no rashes   Musculoskeletal: no joint swelling. no chest wall tenderness reproducible of pain. No calf pain or tenderness. No signs or symptoms of DVT   Vascular: no cyanosis        ASSESSMENT:    ICD-10-CM    1. Acute chest pain R07.9 EKG 12-lead complete w/read - Clinics           PLAN:   EKG flattening of Twaves in lateral leads on my review  Was given 324mg ASA  Transferred to ER for further evaluation and management and rule out of acute cardiorespiratory process. Discussed with receiving ER facility physician/charge nurse.   Patient plans to go to [facility]. Report given.   I advised and recommended ambulance transfer. Patient refused ambulance transfer. Warned about risks to patient and to others on the road and this was discussed in detail however patient still refused.    will drive       Irlanda Woodson MD

## 2018-11-22 NOTE — PATIENT INSTRUCTIONS
Patient with chest tightness and palpitations  Recommend ER evaluation for complete cardiorespiratory rule out.

## 2018-11-27 ENCOUNTER — OFFICE VISIT (OUTPATIENT)
Dept: FAMILY MEDICINE | Facility: CLINIC | Age: 48
End: 2018-11-27
Payer: COMMERCIAL

## 2018-11-27 VITALS
SYSTOLIC BLOOD PRESSURE: 116 MMHG | DIASTOLIC BLOOD PRESSURE: 70 MMHG | WEIGHT: 137.9 LBS | HEIGHT: 66 IN | TEMPERATURE: 98.8 F | BODY MASS INDEX: 22.16 KG/M2 | HEART RATE: 80 BPM

## 2018-11-27 DIAGNOSIS — S62.639B OPEN FRACTURE OF TUFT OF DISTAL PHALANX OF FINGER: ICD-10-CM

## 2018-11-27 DIAGNOSIS — Z23 NEED FOR PROPHYLACTIC VACCINATION AND INOCULATION AGAINST INFLUENZA: ICD-10-CM

## 2018-11-27 DIAGNOSIS — F43.0 ACUTE REACTION TO STRESS: Primary | ICD-10-CM

## 2018-11-27 DIAGNOSIS — G43.109 MIGRAINE WITH AURA AND WITHOUT STATUS MIGRAINOSUS, NOT INTRACTABLE: ICD-10-CM

## 2018-11-27 DIAGNOSIS — G47.09 OTHER INSOMNIA: ICD-10-CM

## 2018-11-27 PROCEDURE — 99214 OFFICE O/P EST MOD 30 MIN: CPT | Mod: 25 | Performed by: FAMILY MEDICINE

## 2018-11-27 PROCEDURE — 90471 IMMUNIZATION ADMIN: CPT | Performed by: FAMILY MEDICINE

## 2018-11-27 PROCEDURE — 90682 RIV4 VACC RECOMBINANT DNA IM: CPT | Performed by: FAMILY MEDICINE

## 2018-11-27 ASSESSMENT — PATIENT HEALTH QUESTIONNAIRE - PHQ9
5. POOR APPETITE OR OVEREATING: MORE THAN HALF THE DAYS
SUM OF ALL RESPONSES TO PHQ QUESTIONS 1-9: 7

## 2018-11-27 ASSESSMENT — ANXIETY QUESTIONNAIRES
5. BEING SO RESTLESS THAT IT IS HARD TO SIT STILL: SEVERAL DAYS
7. FEELING AFRAID AS IF SOMETHING AWFUL MIGHT HAPPEN: SEVERAL DAYS
3. WORRYING TOO MUCH ABOUT DIFFERENT THINGS: MORE THAN HALF THE DAYS
6. BECOMING EASILY ANNOYED OR IRRITABLE: SEVERAL DAYS
1. FEELING NERVOUS, ANXIOUS, OR ON EDGE: MORE THAN HALF THE DAYS
2. NOT BEING ABLE TO STOP OR CONTROL WORRYING: SEVERAL DAYS
GAD7 TOTAL SCORE: 10

## 2018-11-27 NOTE — MR AVS SNAPSHOT
After Visit Summary   11/27/2018    Daphnie Yung    MRN: [id number]           Patient Information     Date Of Birth          1970        Visit Information        Provider Department      11/27/2018 2:00 PM Leigh Ann Purdy MD [facility]        Today's Diagnoses     Acute reaction to stress    -  1    Other insomnia        Need for prophylactic vaccination and inoculation against influenza           Follow-ups after your visit        Additional Services     MENTAL HEALTH REFERRAL  - Adult; Outpatient Treatment; Individual/Couples/Family/Group Therapy/Health Psychology; G: [facility] [phone]; We will contact you to schedule the appointment or please call with any questions       All scheduling is subject to the client's specific insurance plan & benefits, provider/location availability, and provider clinical specialities.  Please arrive 15 minutes early for your first appointment and bring your completed paperwork.    Please be aware that coverage of these services is subject to the terms and limitations of your health insurance plan.  Call member services at your health plan with any benefit or coverage questions.                            Who to contact     Normal or non-critical lab and imaging results will be communicated to you by [employer], letter or phone within 4 business days after the clinic has received the results. If you do not hear from us within 7 days, please contact the clinic through [employer] or phone. If you have a critical or abnormal lab result, we will notify you by phone as soon as possible.  Submit refill requests through [employer] or call your pharmacy and they will forward the refill request to us. Please allow 3 business days for your refill to be completed.          If you need to speak with a  for additional information , please call: [phone]             Additional Information About Your Visit        [employer]  "Information     Mannie gives you secure access to your electronic health record. If you see a primary care provider, you can also send messages to your care team and make appointments. If you have questions, please call your primary care clinic.  If you do not have a primary care provider, please call [phone] and they will assist you.        Care EveryWhere ID     This is your Care EveryWhere ID. This could be used by other organizations to access your [facility] medical records  [id number]        Your Vitals Were     Pulse Temperature Height BMI (Body Mass Index)          80 98.8  F (37.1  C) (Tympanic) 5' 5.75\" (1.67 m) 22.43 kg/m2         Blood Pressure from Last 3 Encounters:   11/27/18 116/70   11/21/18 120/71   11/11/18 126/82    Weight from Last 3 Encounters:   11/27/18 137 lb 14.4 oz (62.6 kg)   11/21/18 136 lb (61.7 kg)   11/11/18 139 lb 3.2 oz (63.1 kg)              We Performed the Following     FLU VACCINE, (RIV4) RECOMBINANT HA  , IM (FluBlok, egg free) [51153]- >18 YRS (G recommended  50-64 YRS)     MENTAL HEALTH REFERRAL  - Adult; Outpatient Treatment; Individual/Couples/Family/Group Therapy/Health Psychology; [facility] [place] [phone]; We will contact you to schedule the appointment or please call with any questions     Vaccine Administration, Initial [90641]        Primary Care Provider Office Phone # Fax #    [facility] [phone] [address] [facility] [address]        Equal Access to Services     [facility] ROBE : Hadii jodie Gore, waaxda luqadaha, qaybta henrialana m odell. So [facility] [phone].    ATENCIÓN: Si habla español, tiene a jarrell disposición servicios gratuitos de asistencia lingüística. Llame al [phone].    We comply with applicable federal civil rights laws and [place] laws. We do not discriminate on the basis of race, color, national origin, age, disability, " sex, sexual orientation, or gender identity.            Thank you!     Thank you for choosing [facility]  for your care. Our goal is always to provide you with excellent care. Hearing back from our patients is one way we can continue to improve our services. Please take a few minutes to complete the written survey that you may receive in the mail after your visit with us. Thank you!             Your Updated Medication List - Protect others around you: Learn how to safely use, store and throw away your medicines at www.disposemymeds.org.          This list is accurate as of 11/27/18  3:01 PM.  Always use your most recent med list.                   Brand Name Dispense Instructions for use Diagnosis    ADVIL PO           rizatriptan 10 MG tablet    MAXALT     Reported on 5/11/2017        triamcinolone 0.1 % paste    KENALOG    5 g    Take by mouth 2 times daily Picking it up right away    Canker sores oral

## 2018-11-27 NOTE — PROGRESS NOTES
"  SUBJECTIVE:   Daphnie Yung is a 48 year old female who presents to clinic today for the following health issues:    Two emergency room visits this last month     ED/UC Followup:    Facility:  [facility]   Date of visit: 11/21/2018  Reason for visit: Chest pain   Current Status: Patient said she is doing better but wants to discuss stress management        11/21/18 ER chest pain: now feels it was entirely due to stress in her life:    Quit her job at the end of august.   Lights at work were too bright and were causing migraines   21yo co-worker committed suicide - \"she was like a daughter to me\"   Father fell a month+ ago and broke his pelvis. Daphnie had to take care of her mom - took a lot of time every day  Moved her parents into assisted living - constant phone calls and stuff  Big fight with sibling and then had chest pain for 3 days  - this is when she went into ER   Grandson diagnosed with asthma  Lost her best friend relationship     Current symptoms :   No further chest symptoms  Acknowledging lots of stress  She is worried about her stress level today - wondering what she can do about it. Denies anxiety/depression  No si/hi     Siblings - three of them travel and two are around and can help.    Watches two year old grandson two days a week - this is good - it relaxes her. Not stressful     Migraines   Getting them more often recently  No change in symptoms, just more triggers.     Sleeping ok sometimes, other times she is lying awake at night     No exercise     11/11/18 - urgent care - tuft fracture of the right thumb  Wearing a brace  Wondering if that is correct     Review of systems:  No vision changes  No chest pain/sob/cough  No n/v   No diarrhea/constipation   No rash       Problem list and histories reviewed & adjusted, as indicated.  Additional history: as documented      Patient Active Problem List   Diagnosis     Chronic rhinitis     Chronic headache disorder     Chronic diarrhea     Irritable " "bowel syndrome     Vegan diet     Migraine with aura and without status migrainosus, not intractable     Current Outpatient Prescriptions   Medication     Ibuprofen (ADVIL PO)     rizatriptan (MAXALT) 10 MG tablet     triamcinolone (KENALOG) 0.1 % paste     No current facility-administered medications for this visit.         Allergies   Allergen Reactions     Cats      Dogs      Dust Mites      Nkda [No Known Drug Allergies]      Seasonal Allergies      Trees      Tree mold     Latex      Rash         /70 (BP Location: Right arm, Patient Position: Sitting, Cuff Size: Adult Regular)  Pulse 80  Temp 98.8  F (37.1  C) (Tympanic)  Ht 5' 5.75\" (1.67 m)  Wt 137 lb 14.4 oz (62.6 kg)  BMI 22.43 kg/m2  GENERAL - Pt is alert and oriented in no acute distress.  Affect is appropriate. Good eye contact.  PSYCH - Pt makes good eye contact, is clean and well-dressed. Oriented to person,place,and time. Cooperative. No speech abnormalities. No psychomotor agitation or retardation.  Thought process was normal and thought content was free of suicidal/homicidal ideation, obsessions, and delusions. Insight and judgement were good.  right thumb - brace removed, no swelling or bruising.   Assessment/Plan -    (F43.0) Acute reaction to stress  (primary encounter diagnosis)  Comment: long discussion with her about current stressors and self care. She is interested in counseling. She will print out a blank calendar and schedule time for exercise, plan a weekly massage, and make time for girlfriends. Try to find time for quiet dinner out with her .  We discussed trial of ssri to help with anxiety but she declines for now.  Return to clinic prn. The patient indicates understanding of these issues and agrees with the plan.  Plan: MENTAL HEALTH REFERRAL  - Adult; Outpatient         Treatment; Individual/Couples/Family/Group         Therapy/Health Psychology; [facility] [place]: [facility] [phone]; We will       "   contact you to schedule the appointment or         please call with any questions            (G47.09) Other insomnia  Comment: Discussed sleep hygiene. She will try intermittent benadryl for sleep - Discussed risks/benefits/side effects with the patient.   Plan:     (Z23) Need for prophylactic vaccination and inoculation against influenza  Comment:   Plan: FLU VACCINE, (RIV4) RECOMBINANT HA  , IM         (FluBlok, egg free) [66595]- >18 YRS ([facility]         recommended  50-64 YRS), Vaccine         Administration, Initial [09953]            (G43.109) Migraine with aura and without status migrainosus, not intractable  Comment: migraines are more frequent recently but this is likely due to high stress level. She doesn't want to discuss prophylactic med for migraine at this time. Is going to work on stress, sleep, and exercise and see if migraine frequency decreases. If migraines persist, return to clinic. The patient indicates understanding of these issues and agrees with the plan.   Plan:       (S65.740M) Open fracture of tuft of distal phalanx of finger  Comment: wear brace for a few more weeks. Work on gentle range of motion exercises when it doesn't hurt. The patient indicates understanding of these issues and agrees with the plan.   Plan:         CATE Purdy MD     Injectable Influenza Immunization Documentation    1.  Is the person to be vaccinated sick today?   No    2. Does the person to be vaccinated have an allergy to a component   of the vaccine?   No  Egg Allergy Algorithm Link    3. Has the person to be vaccinated ever had a serious reaction   to influenza vaccine in the past?   No    4. Has the person to be vaccinated ever had Guillain-Barré syndrome?   No    Form completed by Carlotta

## 2018-11-28 ASSESSMENT — ANXIETY QUESTIONNAIRES: GAD7 TOTAL SCORE: 10

## 2019-01-10 ENCOUNTER — OFFICE VISIT (OUTPATIENT)
Dept: PSYCHOLOGY | Facility: CLINIC | Age: 49
End: 2019-01-10
Attending: FAMILY MEDICINE
Payer: COMMERCIAL

## 2019-01-10 DIAGNOSIS — F43.22 ADJUSTMENT DISORDER WITH ANXIETY: Primary | ICD-10-CM

## 2019-01-10 PROCEDURE — 90791 PSYCH DIAGNOSTIC EVALUATION: CPT | Performed by: COUNSELOR

## 2019-01-10 ASSESSMENT — ANXIETY QUESTIONNAIRES
6. BECOMING EASILY ANNOYED OR IRRITABLE: SEVERAL DAYS
5. BEING SO RESTLESS THAT IT IS HARD TO SIT STILL: NOT AT ALL
GAD7 TOTAL SCORE: 3
IF YOU CHECKED OFF ANY PROBLEMS ON THIS QUESTIONNAIRE, HOW DIFFICULT HAVE THESE PROBLEMS MADE IT FOR YOU TO DO YOUR WORK, TAKE CARE OF THINGS AT HOME, OR GET ALONG WITH OTHER PEOPLE: SOMEWHAT DIFFICULT
3. WORRYING TOO MUCH ABOUT DIFFERENT THINGS: NOT AT ALL
1. FEELING NERVOUS, ANXIOUS, OR ON EDGE: SEVERAL DAYS
2. NOT BEING ABLE TO STOP OR CONTROL WORRYING: NOT AT ALL
7. FEELING AFRAID AS IF SOMETHING AWFUL MIGHT HAPPEN: NOT AT ALL

## 2019-01-10 ASSESSMENT — PATIENT HEALTH QUESTIONNAIRE - PHQ9
SUM OF ALL RESPONSES TO PHQ QUESTIONS 1-9: 5
5. POOR APPETITE OR OVEREATING: SEVERAL DAYS

## 2019-01-10 NOTE — PROGRESS NOTES
"                                                                                                                                                                    Adult Intake Structured Interview  Standard Diagnostic Assessment      CLIENT'S NAME: Daphnie Yung  MRN:   [id number]  :   1970  ACCT. NUMBER: [id number]  DATE OF SERVICE: 1/10/19      Identifying Information:  Client is a 48 year old, ,  female. Client was referred for counseling by Leigh Ann Purdy at [facility]. Client is currently unemployed. Client previously was working for the [employer], quit in 2018 due to migraines caused by lighting that interfered with her work. Client attended the session alone.     Client's Statement of Presenting Concern:  Client reports the reason for seeking therapy at this time as anxiety stemming from life transitions and stressors. Client reports in the past 5 months she has quit her job, experienced the suicide completion of a former coworker, broken her thumb, and had to take on the responsibility of transitioning her parents into assisted living due to her father falling and mother experiencing dimension. Client stated that her symptoms have resulted in the following functional impairments: health maintenance, management of the household and or completion of tasks, relationship(s), self-care and social interactions      History of Presenting Concern:  Client reports that these problem(s) began within the past few months, although identifies herself as \"always been a worrier\". Client has attempted to resolve these concerns in the past through going to the ER due to physical concerns related to stress, increasing healthy sleeping and eating habits, accepting that she cannot do everything for everyone and is not in control of everything.. Client reports that other professional(s) are not involved in providing support / services.       Social History:  Client reported she " "grew up in [place], [place]. They were the fifth born of 5 children. This is an intact family and parents remain . Client reported that her childhood was \"happy, fun, loved, vibrant, always busy, close-knit.\". Client described her current relationships with family of origin as extremely close, although notes increased discourse between siblings due to the transition of care with aging parents.    Client is currently  to her  Elmer (age 51) and denies any history of other marriages or committed relationships. Client reported having 2 children with  (Owen - 31, Kelly - 29). Client identified some stable and meaningful social connections. Client reported that she has not been involved with the legal system. Client's highest education level was some college. Client did not identify any learning problems. There are no ethnic, cultural or Shinto factors that may be relevant for therapy. Client identified her preferred language to be English. Client reported she does not need the assistance of an  or other support involved in therapy. Modifications will not be used to assist communication in therapy. Client did not serve in the .     Client reports family history includes Diabetes in her maternal grandfather; Eye Disorder in her paternal grandmother; Thyroid Disease in her paternal grandmother.    Mental Health History:  Client reported the following biological family members or relatives with mental health issues: Daughter experienced Anxiety and panic attacks.  Client exhibited symptoms of Anxiety but had not been formally diagnosed.  Client has not received mental health services in the past.  Hospitalizations: None. Client did go to the ER for chest pain related to stress within the past couple months.  Client is not currently receiving any mental health services.    Chemical Health History:  Client reported no family history of chemical health issues. Client has not " "received chemical dependency treatment in the past. Client is not currently receiving any chemical dependency treatment. Client reports no problems as a result of their drinking / drug use.    Client Reports:  Client denies using alcohol.  Client denies using tobacco.  Client denies using marijuana.  Client reports using caffeine 2 times per day and drinks 1 at a time. Client started using caffeine at age \"20+ years ago\".  Client denies using street drugs.  Client denies the non-medical use of prescription or over the counter drugs.    CAGE: None of the patient's responses to the CAGE screening were positive / Negative CAGE score   Based on the negative Cage-Aid score and clinical interview there  are not indications of drug or alcohol abuse.    Discussed the general effects of drugs and alcohol on health and well-being. Therapist gave client printed information about the effects of chemical use on her health and well being.      Significant Losses / Trauma / Abuse / Neglect Issues:  There are indications or report of significant loss, trauma, abuse or neglect issues related to: death of former coworker to suicide this past Fall (2018), major medical problems of aging parents leading to need for assisted care this Fall/Winter (2018) and divorce / relational changes related to former best friend a number of years ago due to friend \"making poor choices and becoming a different person\".    Issues of possible neglect are not present.      Medical Issues:  Client has had a physical exam to rule out medical causes for current symptoms. Date of last physical exam was within the past year. Client was encouraged to follow up with PCP if symptoms were to develop. The client does not have a Primary Care Provider and was encouraged to establish care with a PCP. The client reports not having a psychiatrist. Client reports the following current medical concerns: migraines, linked to her cycle. The client reports the presence of " chronic or episodic pain in the form of migraines. The pain level is moderate-severe and has a frequency of 2x monthly. There are not significant nutritional concerns.     Client reports current meds as:   Current Outpatient Medications   Medication Sig     Ibuprofen (ADVIL PO)      rizatriptan (MAXALT) 10 MG tablet Reported on 5/11/2017     triamcinolone (KENALOG) 0.1 % paste Take by mouth 2 times daily Picking it up right away (Patient not taking: Reported on 11/11/2018)     No current facility-administered medications for this visit.        Client Allergies:  Allergies   Allergen Reactions     Cats      Dogs      Dust Mites      Nkda [No Known Drug Allergies]      Seasonal Allergies      Trees      Tree mold     Latex      Rash           Medical History:  Past Medical History:   Diagnosis Date     NO ACTIVE PROBLEMS          Medication Adherence:  N/A - Client does not have prescribed psychiatric medications.    Client was provided recommendation to follow-up with prescribing physician.    Mental Status Assessment:  Appearance:   Appropriate   Eye Contact:   Good   Psychomotor Behavior: Normal   Attitude:   Presents as irritabile and uncooperative initially due to being unclear about purpose of intake process, appears cooperative once informed of reasoning and confidentiality limits   Orientation:   All  Speech   Rate / Production: Normal    Volume:  Normal   Mood:    Anxious  Depressed  Irritable   Affect:    Appropriate   Thought Content:  Clear   Thought Form:  Coherent  Logical   Insight:    Good       Review of Symptoms:  Depression: Guilt Energy Concentration Helpless  Evelin:  No symptoms  Psychosis: No symptoms  Anxiety: Worries Nervousness, Irritability, Rumination, Difficulty sleeping, Feeling down, Fatigue, Difficulty concentrating  Panic:  No symptoms  Post Traumatic Stress Disorder: No symptoms  Obsessive Compulsive Disorder: No symptoms  Eating Disorder: No symptoms  Oppositional Defiant Disorder: No  symptoms  ADD / ADHD: No symptoms  Conduct Disorder: No symptoms      Safety Assessment:    History of Safety Concerns:   Client denied a history of suicidal ideation.    Client denied a history of suicide attempts.    Client denied a history of homicidal ideation.    Client denied a history of self-injurious ideation and behaviors.    Client denied a history of personal safety concerns.    Client denied a history of assaultive behaviors.        Current Safety Concerns:  Client denies current suicidal ideation.    Client denies current homicidal ideation and behaviors.  Client denies current self-injurious ideation and behaviors.    Client denies current concerns for personal safety.    Client reports the following protective factors: spirituality, positive relationships positive family connections, dedication to family/friends, secure attachment, abstinence from substances, living with other people, daily obligations and committment to well-being    Client reports there are firearms in the house. The firearms are secured in a locked space.     Plan for Safety and Risk Management:  A safety and risk management plan has not been developed at this time, however client was given the after-hours number / 911 should there be a change in any of these risk factors.    Client's Strengths and Limitations:  Client identified the following strengths or resources that will help her succeed in counseling: commitment to health and well being, kaleigh / spirituality, friends / good social support, family support and intelligence. Client identified the following supports: family and Anabaptist / spirituality. Things that may interfere with the client's success in counseling include: stigma around counseling services.      Diagnostic Criteria:  A. The development of emotional or behavioral symptoms in response to an identifiable stressor(s) occurring within 3 months of the onset of the stressor(s)  B. These symptoms or behaviors are  clinically significant, as evidenced by one or both of the following:       - Marked distress that is out of proportion to the severity/intensity of the stressor (with consideration for external context & culture)       - Significant impairment in social, occupational, or other important areas of functioning  C. The stress-related disturbance does not meet criteria for another disorder & is not not an exacerbation of another mental disorder  D. The symptoms do not represent normal bereavement  E. Once the stressor or its consequences have terminated, the symptoms do not persist for more than an additional 6 months       * Adjustment Disorder with Anxiety: The predominant manfestations are symptoms such as nervousness, worry, or jitteriness, or, in children separation anxiety from major attachment figures      Functional Status:  Client's symptoms are causing reduced functional status in the following areas: Activities of Daily Living - engaging in self care, appropriate boundaries to limit external stressors  Social / Relational - conflict with siblings, managing transition of elderly parents into assisted living      DSM5 Diagnoses: (Sustained by DSM5 Criteria Listed Above)  Diagnoses: Adjustment Disorders  309.24 (F43.22) With anxiety  Psychosocial & Contextual Factors: Ailing health of elderly parents and transitioning them into assisted living, conflict with siblings around this transition, recently quitting her job, suicide completion of former coworker -- all stressors occurring in the past 5 months  WHODAS 2.0 (12 item)            This questionnaire asks about difficulties due to health conditions. Health conditions  include  disease or illnesses, other health problems that may be short or long lasting,  injuries, mental health or emotional problems, and problems with alcohol or drugs.                     Think back over the past 30 days and answer these questions, thinking about how much  difficulty you had  "doing the following activities. For each question, please [place] only  one response.    S1 Standing for long periods such as 30 minutes? None =         1   S2 Taking care of household responsibilities? None =         1   S3 Learning a new task, for example, learning how to get to a new place? None =         1   S4 How much of a problem do you have joining community activities (for example, festivals, Synagogue or other activities) in the same way as anyone else can? Mild =           2   S5 How much have you been emotionally affected by your health problems? Mild =           2     In the past 30 days, how much difficulty did you have in:   S6 Concentrating on doing something for ten minutes? None =         1   S7 Walking a long distance such as a kilometer (or equivalent)? None =         1   S8 Washing your whole body? None =         1   S9 Getting dressed? None =         1   S10 Dealing with people you do not know? None =         1   S11 Maintaining a friendship? None =         1   S12 Your day to day work? Mild =           2     H1 Overall, in the past 30 days, how many days were these difficulties present? Record number of days \"Ocassional\"   H2 In the past 30 days, for how many days were you totally unable to carry out your usual activities or work because of any health condition? Record number of days  2-3   H3 In the past 30 days, not counting the days that you were totally unable, for how many days did you cut back or reduce your usual activities or work because of any health condition? Record number of days \"Ocassional\"     Attendance Agreement:  Client has signed Attendance Agreement:Yes      Collaboration:  Collaboration with other professionals is not indicated at this time.  Connection will be made to PCP who referred patient.    Preliminary Treatment Plan:  The client reports no currently identified Synagogue, ethnic or cultural issues relevant to therapy.     services are not " indicated.    Modifications to assist communication are not indicated.    The concerns identified by the client will be addressed in therapy.    Initial Treatment will focus on: Adjustment Difficulties related to: family concerns  Relational Problems related to: Conflict or difficulties with family.    As a preliminary treatment goal, client will develop coping/problem-solving skills to facilitate more adaptive adjustment and will address relationship difficulties in a more adaptive manner.    The focus of initial interventions will be to alleviate anxiety, facilitate appropriate expression of feelings, increase ability to function adaptively, increase coping skills, provide homework to reinforce skill development, teach CBT skills, teach conflict management skills, teach DBT skills, teach distress tolerance skills, teach emotional regulation, teach mindfulness skills, teach problem-solving skills, teach relaxation strategies, teach sleep hygiene and teach stress mangement techniques.    Referral to another professional/service is not indicated at this time..    A Release of Information is not needed at this time.    Report to child / adult protection services was NA.    Client will have access to their [facility]' medical record.    Josie Ornelas MA, [facility]   January 10, 2019

## 2019-01-11 ASSESSMENT — ANXIETY QUESTIONNAIRES: GAD7 TOTAL SCORE: 3

## 2019-01-17 ENCOUNTER — OFFICE VISIT (OUTPATIENT)
Dept: PSYCHOLOGY | Facility: CLINIC | Age: 49
End: 2019-01-17
Attending: FAMILY MEDICINE
Payer: COMMERCIAL

## 2019-01-17 DIAGNOSIS — F43.22 ADJUSTMENT DISORDER WITH ANXIETY: Primary | ICD-10-CM

## 2019-01-17 PROCEDURE — 90834 PSYTX W PT 45 MINUTES: CPT | Performed by: COUNSELOR

## 2019-01-17 NOTE — PROGRESS NOTES
Progress Note    Client Name: Daphnie Yung  Date: 1/17/19         Service Type: Individual      Session Start Time: 10:41 am  Session End Time: 11:20 am      Session Length: 39 min     Session #: 2     Attendees: Client attended alone    Treatment Plan Last Reviewed: 1/17/19 (Review by 4/17/19)  PHQ-9 / MERY-7 : ---     DATA      Progress Since Last Session (Related to Symptoms / Goals / Homework):   Symptoms: No change        Homework: n/a        Episode of Care Goals: Treatment goals developed for this episode of care in today's session.     Current / Ongoing Stressors and Concerns:   Ongoing: Declining health of parents and responsibilities for their care, migraines.   Current: Client reports father needed to be transported to the hospital last week, and has been dealing with planning for his discharge today in addition to caring for her mother.     Treatment Objective(s) Addressed in This Session:   Treatment goals developed in today's session.       Intervention:   CBT: Reflected and reinforced effective use of reframing to manage catastrophic thinking.  DBT: Coached on Emotion Regulation and Distress Tolerance techniques to manage stressors. Practice use of deep breathing, progressive muscle relaxation, and grounding techniques in session.        ASSESSMENT: Current Emotional / Mental Status (status of significant symptoms):   Risk status (Self / Other harm or suicidal ideation)   Client denies current fears or concerns for personal safety.   Client denies current or recent suicidal ideation or behaviors.   Client denies current or recent homicidal ideation or behaviors.   Client denies current or recent self injurious behavior or ideation.   Client denies other safety concerns.   Client Client reports there has been no change in risk factors since their last session.     Client Client reports there has been no change in protective factors since their last session.      A safety and risk management plan has not been developed at this time, however client was given the after-hours number / 911 should there be a change in any of these risk factors.     Appearance:   Appropriate    Eye Contact:   Good    Psychomotor Behavior: Restless    Attitude:   Cooperative    Orientation:   All   Speech    Rate / Production: Pressured     Volume:  Normal    Mood:    Anxious    Affect:    Appropriate    Thought Content:  Clear    Thought Form:  Coherent  Logical    Insight:    Good      Medication Review:   No current psychiatric medications prescribed     Medication Compliance:   NA     Changes in Health Issues:   None reported     Chemical Use Review:   Substance Use: Chemical use reviewed, no active concerns identified      Tobacco Use: No current tobacco use.       Collateral Reports Completed:   Not Applicable    PLAN: (Client Tasks / Therapist Tasks / Other)  Follow-up appointment scheduled. Client was given Distress Tolerance handout to utilize between sessions.        Josie Ornelas MA [facility]                                                         ________________________________________________________________________    Treatment Plan    Client's Name: Daphnie Yung  YOB: 1970    Date: 1/17/19    DSM-V Diagnoses: Adjustment Disorders  309.24 (F43.22) With anxiety  Psychosocial / Contextual Factors: Ailing health of elderly parents and transitioning them into assisted living, conflict with siblings around this transition, recently quitting her job, suicide completion of former coworker -- all stressors occurring within a 5 month period  WHODAS: 15    Referral / Collaboration:  Referral to another professional/service is not indicated at this time.    Anticipated number of session or this episode of care: 5      MeasurableTreatment Goal(s) related to diagnosis / functional impairment(s)  Goal 1: Client will increase use of stress management techniques to assist with current  stressors, as evidence by PHQ-9 and MERY-7 scores.       Objective #A (Client Action)    Client will identify sleep hygiene techniques to increase effective sleep and anxiety management prior to bed..  Status: New - Date: 1/17/19     Intervention(s)  Therapist will assign homework    teach distraction skills. Sleep hygiene techniques.    Objective #B  Client will identify 3 stress management strategies to more effectively address stressors.  Status: New - Date: 1/17/19     Intervention(s)  Therapist will assign homework    teach Emotion Regulation and Distress Tolerance techniques.    Objective #C  Client will identify and effectively set boundaries with family to manage stress levels and opportunity for self-care.  Status: New - Date: 1/17/19     Intervention(s)  Therapist will assign homework    role-play effective communication skills  teach about healthy boundaries. teach assertiveness skills.      Client has reviewed and agreed to the above plan.      Josie Ornelas MA, [facility]  January 17, 2019

## 2019-02-07 ENCOUNTER — OFFICE VISIT (OUTPATIENT)
Dept: PSYCHOLOGY | Facility: CLINIC | Age: 49
End: 2019-02-07
Payer: COMMERCIAL

## 2019-02-07 DIAGNOSIS — F43.22 ADJUSTMENT DISORDER WITH ANXIETY: Primary | ICD-10-CM

## 2019-02-07 PROCEDURE — 90834 PSYTX W PT 45 MINUTES: CPT | Performed by: COUNSELOR

## 2019-02-07 ASSESSMENT — PATIENT HEALTH QUESTIONNAIRE - PHQ9
SUM OF ALL RESPONSES TO PHQ QUESTIONS 1-9: 5
5. POOR APPETITE OR OVEREATING: MORE THAN HALF THE DAYS

## 2019-02-07 ASSESSMENT — ANXIETY QUESTIONNAIRES
IF YOU CHECKED OFF ANY PROBLEMS ON THIS QUESTIONNAIRE, HOW DIFFICULT HAVE THESE PROBLEMS MADE IT FOR YOU TO DO YOUR WORK, TAKE CARE OF THINGS AT HOME, OR GET ALONG WITH OTHER PEOPLE: SOMEWHAT DIFFICULT
1. FEELING NERVOUS, ANXIOUS, OR ON EDGE: MORE THAN HALF THE DAYS
6. BECOMING EASILY ANNOYED OR IRRITABLE: SEVERAL DAYS
2. NOT BEING ABLE TO STOP OR CONTROL WORRYING: MORE THAN HALF THE DAYS
GAD7 TOTAL SCORE: 10
7. FEELING AFRAID AS IF SOMETHING AWFUL MIGHT HAPPEN: SEVERAL DAYS
5. BEING SO RESTLESS THAT IT IS HARD TO SIT STILL: NOT AT ALL
3. WORRYING TOO MUCH ABOUT DIFFERENT THINGS: MORE THAN HALF THE DAYS

## 2019-02-07 NOTE — PROGRESS NOTES
Progress Note    Client Name: Daphnie Yung  Date: 2/7/19         Service Type: Individual      Session Start Time: 1:30 pm  Session End Time: 2:15 pm      Session Length: 45 min     Session #: 3     Attendees: Client attended alone    Treatment Plan Last Reviewed: 1/17/19 (Review by 4/17/19)  PHQ-9 / MERY-7 : 5 / 10      DATA  Interactive Complexity: No  Crisis: No       Progress Since Last Session (Related to Symptoms / Goals / Homework):   Symptoms: No change      Homework: Achieved / completed to satisfaction      Episode of Care Goals: Satisfactory progress - ACTION (Actively working towards change); Intervened by reinforcing change plan / affirming steps taken     Current / Ongoing Stressors and Concerns:   Ongoing: Declining health of parents and responsibilities for their care, migraines.   Current: Client reports continued concerns about parent's health and wellbeing. Discussed techniques she is using to manage anxiety, and continued boundary setting with family to ensure appropriate breaks for herself and her family. Discussed some guilt around planning a weekend with her friends, but was responsive to reframing need for self-care in order to help others more effectively. Client discussed sleep has been difficult in the past week.     Treatment Objective(s) Addressed in This Session:   Client will identify sleep hygiene techniques to increase effective sleep and anxiety management prior to bed.     Intervention:   CBT: Reflected and reinforced effective use of reframing to manage catastrophic thinking. Coached on relaxation techniques to assist with barriers to sleep.   Motivational Interviewing    MI Intervention: Open-ended questions, Reflections: simple and complex, Change talk (evoked) and Reframe     Change Talk Expressed by the Patient: Committment to change Taking steps    Provider Response to Change Talk: A - Affirmed patient's thoughts, decisions, or  attempts at behavior change and S - Summarized patient's change talk statements          AASSESSMENT: Current Emotional / Mental Status (status of significant symptoms):   Risk status (Self / Other harm or suicidal ideation)   Client denies current fears or concerns for personal safety.   Client denies current or recent suicidal ideation or behaviors.   Client denies current or recent homicidal ideation or behaviors.   Client denies current or recent self injurious behavior or ideation.   Client denies other safety concerns.   Client Client reports there has been no change in risk factors since their last session.     Client Client reports there has been no change in protective factors since their last session.     A safety and risk management plan has not been developed at this time, however client was given the after-hours number / 911 should there be a change in any of these risk factors.     Appearance:   Appropriate    Eye Contact:   Good    Psychomotor Behavior: Restless    Attitude:   Cooperative    Orientation:   All   Speech    Rate / Production: Pressured     Volume:  Normal    Mood:    Anxious    Affect:    Appropriate    Thought Content:  Clear    Thought Form:  Coherent  Logical    Insight:    Good      Medication Review:   No current psychiatric medications prescribed     Medication Compliance:   NA     Changes in Health Issues:   None reported     Chemical Use Review:   Substance Use: Chemical use reviewed, no active concerns identified      Tobacco Use: No current tobacco use.      Diagnosis:  1. Adjustment disorder with anxiety        Collateral Reports Completed:   Not Applicable    PLAN: (Client Tasks / Therapist Tasks / Other)  Homework: Practice mindfulness techniques prior to bed to assist with rumination that interferes with sleep.  Follow-up appointment scheduled.    Josie Ornelas MA, [facility]                                                           ______________________________________________________________________    Treatment Plan    Client's Name: Daphnie Yung  YOB: 1970    Date: 1/17/19    DSM-V Diagnoses: Adjustment Disorders  309.24 (F43.22) With anxiety  Psychosocial / Contextual Factors: Ailing health of elderly parents and transitioning them into assisted living, conflict with siblings around this transition, recently quitting her job, suicide completion of former coworker -- all stressors occurring within a 5 month period  WHODAS: 15    Referral / Collaboration:  Referral to another professional/service is not indicated at this time.    Anticipated number of session or this episode of care: 5      MeasurableTreatment Goal(s) related to diagnosis / functional impairment(s)  Goal 1: Client will increase use of stress management techniques to assist with current stressors, as evidence by PHQ-9 and MERY-7 scores.       Objective #A (Client Action)    Client will identify sleep hygiene techniques to increase effective sleep and anxiety management prior to bed.  Status: New - Date: 1/17/19     Intervention(s)  Therapist will assign homework    teach distraction skills. Sleep hygiene techniques.    Objective #B  Client will identify 3 stress management strategies to more effectively address stressors.  Status: New - Date: 1/17/19     Intervention(s)  Therapist will assign homework    teach Emotion Regulation and Distress Tolerance techniques.    Objective #C  Client will identify and effectively set boundaries with family to manage stress levels and opportunity for self-care.  Status: New - Date: 1/17/19     Intervention(s)  Therapist will assign homework    role-play effective communication skills  teach about healthy boundaries. teach assertiveness skills.      Client has reviewed and agreed to the above plan.      Josie Ornelas MA, [facility]  2/7/19

## 2019-02-08 ASSESSMENT — ANXIETY QUESTIONNAIRES: GAD7 TOTAL SCORE: 10

## 2019-04-09 ENCOUNTER — OFFICE VISIT (OUTPATIENT)
Dept: FAMILY MEDICINE | Facility: CLINIC | Age: 49
End: 2019-04-09
Payer: COMMERCIAL

## 2019-04-09 VITALS
HEART RATE: 68 BPM | HEIGHT: 66 IN | DIASTOLIC BLOOD PRESSURE: 70 MMHG | BODY MASS INDEX: 21.69 KG/M2 | TEMPERATURE: 97.1 F | SYSTOLIC BLOOD PRESSURE: 112 MMHG | WEIGHT: 135 LBS

## 2019-04-09 DIAGNOSIS — J01.90 ACUTE SINUSITIS WITH SYMPTOMS > 10 DAYS: Primary | ICD-10-CM

## 2019-04-09 PROCEDURE — 99214 OFFICE O/P EST MOD 30 MIN: CPT | Performed by: PHYSICIAN ASSISTANT

## 2019-04-09 RX ORDER — AMOXICILLIN 875 MG
875 TABLET ORAL 2 TIMES DAILY
Qty: 20 TABLET | Refills: 0 | Status: SHIPPED | OUTPATIENT
Start: 2019-04-09 | End: 2019-05-07

## 2019-04-09 ASSESSMENT — MIFFLIN-ST. JEOR: SCORE: 1245.14

## 2019-04-09 NOTE — PATIENT INSTRUCTIONS
Patient Education     Sinusitis (Antibiotic Treatment)    The sinuses are air-filled spaces within the bones of the face. They connect to the inside of the nose. Sinusitis is an inflammation of the tissue that lines the sinuses. Sinusitis can occur during a cold. It can also happen due to allergies to pollens and other particles in the air. Sinusitis can cause symptoms of sinus congestion and a feeling of fullness. A sinus infection causes fever, headache, and facial pain. There is often green or yellow fluid draining from the nose or into the back of the throat (post-nasal drip). You have been given antibiotics to treat this condition.  Home care    Take the full course of antibiotics as instructed. Do not stop taking them, even when you feel better.    Drink plenty of water, hot tea, and other liquids. This may help thin nasal mucus. It also may help your sinuses drain fluids.    Heat may help soothe painful areas of your face. Use a towel soaked in hot water. Or,  the shower and direct the warm spray onto your face. Using a vaporizer along with a menthol rub at night may also help soothe symptoms.     An expectorant with guaifenesin may help thin nasal mucus and help your sinuses drain fluids.    You can use an over-the-counter decongestant, unless a similar medicine was prescribed to you. Nasal sprays work the fastest. Use one that contains phenylephrine or oxymetazoline. First blow your nose gently. Then use the spray. Do not use these medicines more often than directed on the label. If you do, your symptoms may get worse. You may also take pills that contain pseudoephedrine. Don t use products that combine multiple medicines. This is because side effects may be increased. Read labels. You can also ask the pharmacist for help. (People with high blood pressure should not use decongestants. They can raise blood pressure.)    Over-the-counter antihistamines may help if allergies contributed to your  sinusitis.      Do not use nasal rinses or irrigation during an acute sinus infection, unless your healthcare provider tells you to. Rinsing may spread the infection to other areas in your sinuses.    Use acetaminophen or ibuprofen to control pain, unless another pain medicine was prescribed to you. If you have chronic liver or kidney disease or ever had a stomach ulcer, talk with your healthcare provider before using these medicines. (Aspirin should never be taken by anyone under age 18 who is ill with a fever. It may cause severe liver damage.)    Don't smoke. This can make symptoms worse.  Follow-up care  Follow up with your healthcare provider or our staff if you are better in 1 week.  When to seek medical advice  Call your healthcare provider if any of these occur:    Facial pain or headache that gets worse    Stiff neck    Unusual drowsiness or confusion    Swelling of your forehead or eyelids    Vision problems, such as blurred or double vision    Fever of 100.4 F (38 C) or higher, or as directed by your healthcare provider    Seizure    Breathing problems    Symptoms don't go away in 10 days  Prevention  Here are steps you can take to help prevent an infection:    Keep good hand washing habits.    Don t have close contact with people who have sore throats, colds, or other upper respiratory infections.    Don t smoke, and stay away from secondhand smoke.    Stay up to date with of your vaccines.  Date Last Reviewed: 11/1/2017 2000-2018 [employer]. [address] [place] [address], [place] [address]. All rights reserved. This information is not intended as a substitute for professional medical care. Always follow your healthcare professional's instructions.

## 2019-04-09 NOTE — PROGRESS NOTES
"  SUBJECTIVE:   Daphnie Yung is a 48 year old female who presents to clinic today for the following   health issues:             Symptoms: cc Present Absent Comment   Fever/Chills   x    Fatigue  x     Headache x      Muscle or Body  Aches   x    Eye Irritation  x     Sneezing   x    Nasal Grzegorz/Drg x      Sinus Pressure/Pain x      Dental pain   x    Sore Throat   x    Swollen Glands   x    Ear Pain/Fullness x   Left ear, popping, clicking noise   Cough  x     Wheeze  x  Slight wheezing   Chest Discomfort   x    Shortness of breath   x    Abdominal pain   x    Emesis    x    Diarrhea   x    Other   x      Symptom duration:  3 weeks    Symptom severity:  worse today   Treatments tried:  sudafed once, Claritin    Contacts:  yes      Patient has been having cols and sinus symptoms for about 3 weeks. She has been treating with OTC treatments and had been feeling a little better but this morning woke up with severe facial pressure and left ear pain. She also has developed a Migraine headache due to worsening of symptoms.     -------------------------------------    Additional history: as documented    Reviewed  and updated as needed this visit by clinical staff         Reviewed and updated as needed this visit by Provider         BP Readings from Last 3 Encounters:   04/09/19 112/70   11/27/18 116/70   11/21/18 120/71    Wt Readings from Last 3 Encounters:   04/09/19 61.2 kg (135 lb)   11/27/18 62.6 kg (137 lb 14.4 oz)   11/21/18 61.7 kg (136 lb)                    ROS:  Constitutional, HEENT, cardiovascular, pulmonary, gi and gu systems are negative, except as otherwise noted.    OBJECTIVE:     /70   Pulse 68   Temp 97.1  F (36.2  C) (Tympanic)   Ht 1.67 m (5' 5.75\")   Wt 61.2 kg (135 lb)   BMI 21.96 kg/m    Body mass index is 21.96 kg/m .  GENERAL: alert and no distress  HENT: normal cephalic/atraumatic, right ear: clear effusion, left ear: clear effusion and bulging membrane, rhinorrhea yellow, " oropharynx clear, oral mucous membranes moist and sinuses: maxillary tenderness on left  NECK: bilateral anterior cervical adenopathy  RESP: lungs clear to auscultation - no rales, rhonchi or wheezes  CV: regular rate and rhythm, normal S1 S2, no S3 or S4, no murmur, click or rub, no peripheral edema and peripheral pulses strong  ABDOMEN: soft, nontender, no hepatosplenomegaly, no masses and bowel sounds normal  MS: no gross musculoskeletal defects noted, no edema  SKIN: no suspicious lesions or rashes    Diagnostic Test Results:  none     ASSESSMENT/PLAN:       ICD-10-CM    1. Acute sinusitis with symptoms > 10 days J01.90 amoxicillin (AMOXIL) 875 MG tablet       I will treat for sinus infection and have patient follow up as needed if symptoms are worsening or not improving.   See Patient Instructions    Corry Scherer PA-C  [facility]

## 2019-05-07 ENCOUNTER — OFFICE VISIT (OUTPATIENT)
Dept: FAMILY MEDICINE | Facility: CLINIC | Age: 49
End: 2019-05-07
Payer: COMMERCIAL

## 2019-05-07 VITALS
SYSTOLIC BLOOD PRESSURE: 120 MMHG | TEMPERATURE: 97.3 F | DIASTOLIC BLOOD PRESSURE: 68 MMHG | WEIGHT: 135 LBS | HEIGHT: 66 IN | BODY MASS INDEX: 21.69 KG/M2 | HEART RATE: 73 BPM

## 2019-05-07 DIAGNOSIS — H93.8X2 EAR CONGESTION, LEFT: Primary | ICD-10-CM

## 2019-05-07 PROCEDURE — 99214 OFFICE O/P EST MOD 30 MIN: CPT | Performed by: PHYSICIAN ASSISTANT

## 2019-05-07 ASSESSMENT — MIFFLIN-ST. JEOR: SCORE: 1245.14

## 2019-05-07 NOTE — PATIENT INSTRUCTIONS
Patient Education     Earache, No Infection (Adult)  Earaches can happen without an infection. This occurs when air and fluid build up behind the eardrum causing a feeling of fullness and discomfort and reduced hearing. This is called otitis media with effusion (OME) or serous otitis media. It means there is fluid in the middle ear. It is not the same as acute otitis media, which is typically from infection.  OME can happen when you have a cold if congestion blocks the passage that drains the middle ear. This passage is called the eustachian tube. OME may also occur with nasal allergies or after a bacterial middle ear infection.    The pain or discomfort may come and go. You may hear clicking or popping sounds when you chew or swallow. You may feel that your balance is off. Or you may hear ringing in the ear.  It often takes from several weeks up to 3 months for the fluid to clear on its own. Oral pain relievers and ear drops help if there is pain. Decongestants and antihistamines sometimes help. Antibiotics don't help since there is no infection. Your doctor may prescribe a nasal spray to help reduce swelling in the nose and eustachian tube. This can allow the ear to drain.  If your OME doesn't improve after 3 months, surgery may be used to drain the fluid and insert a small tube in the eardrum to allow continued drainage.  Because the middle ear fluid can become infected, it is important to watch for signs of an ear infection which may develop later. These signs include increased ear pain, fever, or drainage from the ear.  Home care  The following guidelines will help you care for yourself at home:    You may use over-the-counter medicine as directed to control pain, unless another medicine was prescribed. If you have chronic liver or kidney disease or ever had a stomach ulcer or GI bleeding, talk with your doctor before using these medicines. Aspirin should never be used in anyone under 18 years of age who is  ill with a fever. It may cause severe liver damage.    You may use over-the-counter decongestants such as phenylephrine or pseudoephedrine. But they are not always helpful. Don't use nasal spray decongestants more than 3 days. Longer use can make congestion worse. Prescription nasal sprays from your doctor don't typically have those restrictions.    Antihistamines may help if you are also having allergy symptoms.    You may use medicines such as guaifenesin to thin mucus and promote drainage.  Follow-up care  Follow up with your healthcare provider or as advised if you are not feeling better after 3 days.  When to seek medical advice  Call your healthcare provider right away if any of the following occur:    Your ear pain gets worse or does not start to improve     Fever of 100.4 F (38 C) or higher, or as directed by your healthcare provider    Fluid or blood draining from the ear    Headache or sinus pain    Stiff neck    Unusual drowsiness or confusion  Date Last Reviewed: 10/1/2016    [id number] [employer]. [address] [place] [address], [place] [address]. All rights reserved. This information is not intended as a substitute for professional medical care. Always follow your healthcare professional's instructions.

## 2019-05-07 NOTE — PROGRESS NOTES
"  SUBJECTIVE:   Daphnie Yung is a 48 year old female who presents to clinic today for the following   health issues:      Chief Complaint   Patient presents with     RECHECK     sinus infection seen, 4/9/19, hasn't completely gone away, left ear is still bothersome. worried becasues she is flying in 2 days.        Patient is here in clinic due to persisting ear congestion and popping. She was seen and treated for a sinus infection in April and states that her symptoms have nearly completely resolved but she continues to have ear fullness and a popping sensation on the left side. She has not had return of sinus pain or pressure.   -------------------------------------    Additional history: as documented    Reviewed  and updated as needed this visit by clinical staff  Tobacco  Allergies  Meds         Reviewed and updated as needed this visit by Provider         BP Readings from Last 3 Encounters:   05/07/19 120/68   04/09/19 112/70   11/27/18 116/70    Wt Readings from Last 3 Encounters:   05/07/19 61.2 kg (135 lb)   04/09/19 61.2 kg (135 lb)   11/27/18 62.6 kg (137 lb 14.4 oz)                    ROS:  Constitutional, HEENT, cardiovascular, pulmonary, gi and gu systems are negative, except as otherwise noted.    OBJECTIVE:     /68   Pulse 73   Temp 97.3  F (36.3  C) (Tympanic)   Ht 1.67 m (5' 5.75\")   Wt 61.2 kg (135 lb)   BMI 21.96 kg/m    Body mass index is 21.96 kg/m .  GENERAL: alert and no distress  EYES: Eyes grossly normal to inspection, PERRL and conjunctivae and sclerae normal  HENT: normal cephalic/atraumatic, right ear: normal: no effusions, no erythema, normal landmarks, left ear: clear effusion, rhinorrhea clear, oropharynx clear, oral mucous membranes moist and sinuses: not tender  NECK: no adenopathy, no asymmetry, masses, or scars and thyroid normal to palpation  RESP: lungs clear to auscultation - no rales, rhonchi or wheezes  CV: regular rates and rhythm, peripheral pulses strong and " no peripheral edema  MS: no gross musculoskeletal defects noted, no edema  SKIN: no suspicious lesions or rashes    Diagnostic Test Results:  none     ASSESSMENT/PLAN:       ICD-10-CM    1. Ear congestion, left H93.8X2        I will have patient treat with OTC Mucinex D and heat packs, follow up if fevers or other new or worsening symptoms.   See Patient Instructions    Corry Scherer PA-C  [facility]

## 2019-09-04 ENCOUNTER — OFFICE VISIT (OUTPATIENT)
Dept: FAMILY MEDICINE | Facility: CLINIC | Age: 49
End: 2019-09-04
Payer: COMMERCIAL

## 2019-09-04 VITALS
HEART RATE: 88 BPM | WEIGHT: 136.2 LBS | BODY MASS INDEX: 22.15 KG/M2 | RESPIRATION RATE: 18 BRPM | DIASTOLIC BLOOD PRESSURE: 71 MMHG | TEMPERATURE: 97.3 F | OXYGEN SATURATION: 98 % | SYSTOLIC BLOOD PRESSURE: 104 MMHG

## 2019-09-04 DIAGNOSIS — J06.9 VIRAL UPPER RESPIRATORY TRACT INFECTION: ICD-10-CM

## 2019-09-04 DIAGNOSIS — R07.0 THROAT PAIN: Primary | ICD-10-CM

## 2019-09-04 LAB
DEPRECATED S PYO AG THROAT QL EIA: NORMAL
SPECIMEN SOURCE: NORMAL

## 2019-09-04 PROCEDURE — 99213 OFFICE O/P EST LOW 20 MIN: CPT | Performed by: PHYSICIAN ASSISTANT

## 2019-09-04 PROCEDURE — 87880 STREP A ASSAY W/OPTIC: CPT | Performed by: PHYSICIAN ASSISTANT

## 2019-09-04 PROCEDURE — 87081 CULTURE SCREEN ONLY: CPT | Performed by: PHYSICIAN ASSISTANT

## 2019-09-04 NOTE — PATIENT INSTRUCTIONS
For sinus congestion: Sudafed and DayQuil  For cough: Delsym and NyQuil (before bed)  For pain: ibuprofen 600mg every 6-8 hours as needed    Your strep test is negative. Your symptoms are likely caused by a viral syndrome. Viral syndromes typically last 1-2 weeks.  Increase your water intake in order to keep the secretions/mucous in your upper respiratory tract thin. Get plenty of rest and wash your hands well. Follow up if symptoms fail to improve or worsen.      Call the clinic if your symptoms have not shown improvement by the 2 week beto.

## 2019-09-04 NOTE — PROGRESS NOTES
SUBJECTIVE:  Daphnie Yung is a 49 year old female who presents with the following concerns;              Symptoms: cc Present Absent Comment   Fever/Chills   x    Fatigue  x     Muscle Aches  x     Eye Irritation   x    Sneezing  x  occ   Nasal Grzegorz/Drg  x     Sinus Pressure/Pain  x  pressure   Loss of smell  x     Dental pain   x    Sore Throat  x  Worsening since friday   Swollen Glands  x  maybe   Ear Pain/Fullness   x    Cough  x     Wheeze   x    Chest Pain   x    Shortness of breath  x     Rash   x    Other  x  Headaches     Symptom duration:  x2wks   Sympom severity:  worsening over the wknd, feels better today   Treatments tried:  OTC Advil    Contacts:  friend       Medications updated and reviewed.  Past, family and surgical history is updated and reviewed in the record.    ROS:  Other than noted above, general, HEENT, respiratory, cardiac and gastrointestinal systems are negative.    OBJECTIVE:  /71   Pulse 88   Temp 97.3  F (36.3  C) (Tympanic)   Resp 18   Wt 61.8 kg (136 lb 3.2 oz)   SpO2 98%   BMI 22.15 kg/m    GENERAL: Pleasant and interactive. No acute distress.  HEENT: Mild injection of conjunctiva. TMs clear. Oropharynx moist and clear.   NECK: supple and free of adenopathy or masses  CHEST:  clear, no wheezing or rales. Normal symmetric air entry throughout both lung fields. No chest wall deformities or tenderness.  HEART:  S1 and S2 normal, no murmurs, clicks, gallops or rubs. Regular rate and rhythm.  SKIN:  Only benign skin findings. No unusual rashes or suspicious skin lesions noted. Nails appear normal.    RST: neg      Assessment:    Encounter Diagnoses   Name Primary?     Throat pain Yes     Viral upper respiratory tract infection      Plan:   Patient has noticed some improvements - likely viral. Supportive therapy also discussed. Call in 1 week if symptoms fail to improve or worsen.      The patient was in agreement with the plan today and had no questions or concerns prior  to leaving the clinic.     Talia Alston PA-C

## 2019-09-05 LAB
BACTERIA SPEC CULT: NORMAL
SPECIMEN SOURCE: NORMAL

## 2019-09-26 ENCOUNTER — OFFICE VISIT (OUTPATIENT)
Dept: FAMILY MEDICINE | Facility: CLINIC | Age: 49
End: 2019-09-26
Payer: COMMERCIAL

## 2019-09-26 VITALS
OXYGEN SATURATION: 98 % | SYSTOLIC BLOOD PRESSURE: 98 MMHG | BODY MASS INDEX: 21.69 KG/M2 | TEMPERATURE: 99 F | HEIGHT: 66 IN | RESPIRATION RATE: 16 BRPM | HEART RATE: 74 BPM | DIASTOLIC BLOOD PRESSURE: 62 MMHG | WEIGHT: 135 LBS

## 2019-09-26 DIAGNOSIS — G47.09 OTHER INSOMNIA: ICD-10-CM

## 2019-09-26 DIAGNOSIS — F43.22 ADJUSTMENT DISORDER WITH ANXIETY: ICD-10-CM

## 2019-09-26 DIAGNOSIS — Z00.00 ROUTINE GENERAL MEDICAL EXAMINATION AT A HEALTH CARE FACILITY: Primary | ICD-10-CM

## 2019-09-26 DIAGNOSIS — Z13.220 LIPID SCREENING: ICD-10-CM

## 2019-09-26 DIAGNOSIS — G43.109 MIGRAINE WITH AURA AND WITHOUT STATUS MIGRAINOSUS, NOT INTRACTABLE: ICD-10-CM

## 2019-09-26 DIAGNOSIS — Z13.1 SCREENING FOR DIABETES MELLITUS: ICD-10-CM

## 2019-09-26 PROCEDURE — 99396 PREV VISIT EST AGE 40-64: CPT | Performed by: FAMILY MEDICINE

## 2019-09-26 RX ORDER — ACETAMINOPHEN 325 MG/1
325-650 TABLET ORAL PRN
COMMUNITY
End: 2020-01-16

## 2019-09-26 ASSESSMENT — MIFFLIN-ST. JEOR: SCORE: 1240.14

## 2019-09-26 ASSESSMENT — PAIN SCALES - GENERAL: PAINLEVEL: NO PAIN (0)
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
## 2019-10-08 ENCOUNTER — IMMUNIZATION (OUTPATIENT)
Dept: FAMILY MEDICINE | Facility: CLINIC | Age: 49
End: 2019-10-08
Payer: COMMERCIAL

## 2019-10-08 PROCEDURE — 90471 IMMUNIZATION ADMIN: CPT

## 2019-10-08 PROCEDURE — 90682 RIV4 VACC RECOMBINANT DNA IM: CPT

## 2019-11-07 ENCOUNTER — HEALTH MAINTENANCE LETTER (OUTPATIENT)
Age: 49
End: 2019-11-07

## 2019-12-19 ENCOUNTER — TRANSFERRED RECORDS (OUTPATIENT)
Dept: HEALTH INFORMATION MANAGEMENT | Facility: CLINIC | Age: 49
End: 2019-12-19

## 2020-01-16 ENCOUNTER — OFFICE VISIT (OUTPATIENT)
Dept: FAMILY MEDICINE | Facility: CLINIC | Age: 50
End: 2020-01-16
Payer: COMMERCIAL

## 2020-01-16 VITALS
SYSTOLIC BLOOD PRESSURE: 117 MMHG | HEART RATE: 83 BPM | BODY MASS INDEX: 21.86 KG/M2 | HEIGHT: 66 IN | DIASTOLIC BLOOD PRESSURE: 71 MMHG | OXYGEN SATURATION: 100 % | RESPIRATION RATE: 15 BRPM | TEMPERATURE: 98.7 F | WEIGHT: 136 LBS

## 2020-01-16 DIAGNOSIS — L03.012 PARONYCHIA OF FINGER OF LEFT HAND: Primary | ICD-10-CM

## 2020-01-16 PROCEDURE — 99214 OFFICE O/P EST MOD 30 MIN: CPT | Performed by: PHYSICIAN ASSISTANT

## 2020-01-16 RX ORDER — CEPHALEXIN 500 MG/1
500 CAPSULE ORAL 2 TIMES DAILY
Qty: 20 CAPSULE | Refills: 0 | Status: SHIPPED | OUTPATIENT
Start: 2020-01-16 | End: 2020-01-26

## 2020-01-16 ASSESSMENT — MIFFLIN-ST. JEOR: SCORE: 1244.67

## 2020-01-16 NOTE — PROGRESS NOTES
"Subjective     Daphnie Yung is a 49 year old female who presents to clinic today for the following health issues:    HPI   *  Infection on left ring finger, noticed it being really sore over the weekend and on Monday started to become really sore and inflammed. She states she has been put on keflex in the passed and has worked really well for her.    Patient has developed redness, tenderness , mild swelling and pus collection to the left ring finger. No systemic symptoms. No drainage.   -------------------------------------    BP Readings from Last 3 Encounters:   01/16/20 117/71   09/26/19 98/62   09/04/19 104/71    Wt Readings from Last 3 Encounters:   01/16/20 61.7 kg (136 lb)   09/26/19 61.2 kg (135 lb)   09/04/19 61.8 kg (136 lb 3.2 oz)        Reviewed and updated as needed this visit by Provider  Tobacco  Allergies  Meds  Problems  Med Hx  Surg Hx  Fam Hx  Soc Hx        Review of Systems   ROS COMP: Constitutional, HEENT, cardiovascular, pulmonary, gi and gu systems are negative, except as otherwise noted.      Objective    /71   Pulse 83   Temp 98.7  F (37.1  C) (Tympanic)   Resp 15   Ht 1.67 m (5' 5.75\")   Wt 61.7 kg (136 lb)   SpO2 100%   BMI 22.12 kg/m    Body mass index is 22.12 kg/m .  Physical Exam   GENERAL: alert and no distress  MS: no gross musculoskeletal defects noted, no edema  SKIN: there is redness and a collection of pus to the medial left ring finger, no drainage    Diagnostic Test Results:  none         Assessment & Plan       ICD-10-CM    1. Paronychia of finger of left hand L03.012 cephALEXin (KEFLEX) 500 MG capsule          See Patient Instructions    No follow-ups on file.    Corry Scherer PA-C  [facility]    "

## 2020-01-16 NOTE — PATIENT INSTRUCTIONS
Patient Education     Paronychia of the Finger or Toe  Paronychia is an infection near a fingernail or toenail. It usually occurs when an opening in the cuticle or an ingrown toenail lets bacteria under the skin.  The infection will need to be drained if pus is present. If the infection has been caught early, you may need only antibiotic treatment. Healing will take about 1 to 2 weeks.  Home care  Follow these guidelines when caring for yourself at home:    Clean and soak the toe or finger. Do this 2 times a day for the first 3 days. To do so:  ? Soak your foot or hand in a tub of warm water for 5 minutes. Or hold your toe or finger under a faucet of warm running water for 5 minutes.  ? Clean any crust away with soap and water using a cotton swab.  ? Put antibiotic ointment on the infected area.    Change the dressing daily or any time it gets dirty.    If you were given antibiotics, take them as directed until they are all gone.    If your infection is on a toe, wear comfortable shoes with a lot of toe room. You can also wear open-toed sandals while your toe heals.    You may use over-the-counter medicine (acetaminophen or ibuprofen to help with pain, unless another medicine was prescribed. If you have chronic liver or kidney disease, talk with your healthcare provider before using these medicines. Also talk with your provider if you've had a stomach ulcer or GI (gastrointestinal) bleeding.  Prevention  The following can prevent paronychia:    Avoid cutting or playing with your cuticles at home.    Don't bite your nails.    Don't suck on your thumbs or fingers.  Follow-up care  Follow up with your healthcare provider, or as advised.  When to seek medical advice  Call your healthcare provider right away if any of these occur:    Redness, pain, or swelling of the finger or toe gets worse    Red streaks in the skin leading away from the wound    Pus or fluid draining from the nail area    Fever of 100.4 F (38 C) or  higher, or as directed by your provider  Date Last Reviewed: 8/1/2016 2000-2019 [email or web address] [place]. [address] [place] [address], [place] [address]. All rights reserved. This information is not intended as a substitute for professional medical care. Always follow your healthcare professional's instructions.

## 2020-11-29 ENCOUNTER — HEALTH MAINTENANCE LETTER (OUTPATIENT)
Age: 50
End: 2020-11-29

## 2021-02-01 ENCOUNTER — OFFICE VISIT (OUTPATIENT)
Dept: FAMILY MEDICINE | Facility: CLINIC | Age: 51
End: 2021-02-01
Payer: COMMERCIAL

## 2021-02-01 ENCOUNTER — NURSE TRIAGE (OUTPATIENT)
Dept: NURSING | Facility: CLINIC | Age: 51
End: 2021-02-01

## 2021-02-01 VITALS
TEMPERATURE: 97.3 F | DIASTOLIC BLOOD PRESSURE: 83 MMHG | BODY MASS INDEX: 20.6 KG/M2 | RESPIRATION RATE: 16 BRPM | HEIGHT: 66 IN | SYSTOLIC BLOOD PRESSURE: 121 MMHG | HEART RATE: 87 BPM | WEIGHT: 128.2 LBS | OXYGEN SATURATION: 99 %

## 2021-02-01 DIAGNOSIS — R00.2 PALPITATIONS: ICD-10-CM

## 2021-02-01 DIAGNOSIS — R63.4 WEIGHT LOSS: ICD-10-CM

## 2021-02-01 DIAGNOSIS — F43.22 ADJUSTMENT DISORDER WITH ANXIETY: Primary | ICD-10-CM

## 2021-02-01 LAB
ALBUMIN SERPL-MCNC: 4.2 G/DL (ref 3.4–5)
ALP SERPL-CCNC: 101 U/L (ref 40–150)
ALT SERPL W P-5'-P-CCNC: 21 U/L (ref 0–50)
ANION GAP SERPL CALCULATED.3IONS-SCNC: <1 MMOL/L (ref 3–14)
AST SERPL W P-5'-P-CCNC: 7 U/L (ref 0–45)
BASOPHILS # BLD AUTO: 0 10E9/L (ref 0–0.2)
BASOPHILS NFR BLD AUTO: 0.4 %
BILIRUB SERPL-MCNC: 0.4 MG/DL (ref 0.2–1.3)
BUN SERPL-MCNC: 14 MG/DL (ref 7–30)
CALCIUM SERPL-MCNC: 9.8 MG/DL (ref 8.5–10.1)
CHLORIDE SERPL-SCNC: 107 MMOL/L (ref 94–109)
CO2 SERPL-SCNC: 32 MMOL/L (ref 20–32)
CREAT SERPL-MCNC: 0.78 MG/DL (ref 0.52–1.04)
DIFFERENTIAL METHOD BLD: NORMAL
EOSINOPHIL # BLD AUTO: 0.1 10E9/L (ref 0–0.7)
EOSINOPHIL NFR BLD AUTO: 1.4 %
ERYTHROCYTE [DISTWIDTH] IN BLOOD BY AUTOMATED COUNT: 12.8 % (ref 10–15)
GFR SERPL CREATININE-BSD FRML MDRD: 87 ML/MIN/{1.73_M2}
GLUCOSE SERPL-MCNC: 89 MG/DL (ref 70–99)
HCT VFR BLD AUTO: 44 % (ref 35–47)
HGB BLD-MCNC: 14.3 G/DL (ref 11.7–15.7)
LYMPHOCYTES # BLD AUTO: 2 10E9/L (ref 0.8–5.3)
LYMPHOCYTES NFR BLD AUTO: 29.1 %
MCH RBC QN AUTO: 28.9 PG (ref 26.5–33)
MCHC RBC AUTO-ENTMCNC: 32.5 G/DL (ref 31.5–36.5)
MCV RBC AUTO: 89 FL (ref 78–100)
MONOCYTES # BLD AUTO: 0.4 10E9/L (ref 0–1.3)
MONOCYTES NFR BLD AUTO: 5.9 %
NEUTROPHILS # BLD AUTO: 4.4 10E9/L (ref 1.6–8.3)
NEUTROPHILS NFR BLD AUTO: 63.2 %
PLATELET # BLD AUTO: 259 10E9/L (ref 150–450)
POTASSIUM SERPL-SCNC: 4.8 MMOL/L (ref 3.4–5.3)
PROT SERPL-MCNC: 7.7 G/DL (ref 6.8–8.8)
RBC # BLD AUTO: 4.95 10E12/L (ref 3.8–5.2)
SODIUM SERPL-SCNC: 139 MMOL/L (ref 133–144)
TSH SERPL DL<=0.005 MIU/L-ACNC: 1.42 MU/L (ref 0.4–4)
WBC # BLD AUTO: 6.9 10E9/L (ref 4–11)

## 2021-02-01 PROCEDURE — 99214 OFFICE O/P EST MOD 30 MIN: CPT | Performed by: FAMILY MEDICINE

## 2021-02-01 PROCEDURE — 36415 COLL VENOUS BLD VENIPUNCTURE: CPT | Performed by: FAMILY MEDICINE

## 2021-02-01 PROCEDURE — 80050 GENERAL HEALTH PANEL: CPT | Performed by: FAMILY MEDICINE

## 2021-02-01 PROCEDURE — 93000 ELECTROCARDIOGRAM COMPLETE: CPT | Performed by: FAMILY MEDICINE

## 2021-02-01 RX ORDER — ESCITALOPRAM OXALATE 10 MG/1
TABLET ORAL
Qty: 30 TABLET | Refills: 1 | Status: SHIPPED | OUTPATIENT
Start: 2021-02-01 | End: 2021-02-25

## 2021-02-01 RX ORDER — PROPRANOLOL HYDROCHLORIDE 20 MG/1
20 TABLET ORAL 3 TIMES DAILY PRN
Qty: 30 TABLET | Refills: 1 | Status: SHIPPED | OUTPATIENT
Start: 2021-02-01 | End: 2022-03-09

## 2021-02-01 ASSESSMENT — ANXIETY QUESTIONNAIRES
7. FEELING AFRAID AS IF SOMETHING AWFUL MIGHT HAPPEN: MORE THAN HALF THE DAYS
5. BEING SO RESTLESS THAT IT IS HARD TO SIT STILL: NOT AT ALL
GAD7 TOTAL SCORE: 17
2. NOT BEING ABLE TO STOP OR CONTROL WORRYING: NEARLY EVERY DAY
1. FEELING NERVOUS, ANXIOUS, OR ON EDGE: NEARLY EVERY DAY
6. BECOMING EASILY ANNOYED OR IRRITABLE: NEARLY EVERY DAY
3. WORRYING TOO MUCH ABOUT DIFFERENT THINGS: NEARLY EVERY DAY

## 2021-02-01 ASSESSMENT — PATIENT HEALTH QUESTIONNAIRE - PHQ9
SUM OF ALL RESPONSES TO PHQ QUESTIONS 1-9: 5
5. POOR APPETITE OR OVEREATING: NEARLY EVERY DAY

## 2021-02-01 ASSESSMENT — PAIN SCALES - GENERAL: PAINLEVEL: NO PAIN (0)

## 2021-02-01 ASSESSMENT — MIFFLIN-ST. JEOR: SCORE: 1204.29

## 2021-02-01 NOTE — PATIENT INSTRUCTIONS
Relaxation technique 1: Breathing meditation for stress relief  With its focus on full, cleansing breaths, deep breathing is a simple, yet powerful, relaxation technique. It s easy to learn, can be practiced almost anywhere, and provides a quick way to get your stress levels in check. Deep breathing is the cornerstone of many other relaxation practices, too, and can be combined with other relaxing elements such as aromatherapy and music. All you really need is a few minutes and a place to stretch out.  Practicing deep breathing meditation  The key to deep breathing is to breathe deeply from the abdomen, getting as much fresh air as possible in your lungs. When you take deep breaths from the abdomen, rather than shallow breaths from your upper chest, you inhale more oxygen. The more oxygen you get, the less tense, short of breath, and anxious you feel.  Sit comfortably with your back straight. Put one hand on your chest and the other on your stomach.   Breathe in through your nose. The hand on your stomach should rise. The hand on your chest should move very little.   Exhale through your mouth, pushing out as much air as you can while campbell your abdominal muscles. The hand on your stomach should move in as you exhale, but your other hand should move very little.   Continue to breathe in through your nose and out through your mouth. Try to inhale enough so that your lower abdomen rises and falls. Count slowly as you exhale.   If you find it difficult breathing from your abdomen while sitting up, try lying on the floor. Put a small book on your stomach, and try to breathe so that the book rises as you inhale and falls as you exhale.   Relaxation technique 2: Progressive muscle relaxation for stress relief  Progressive muscle relaxation involves a two-step process in which you systematically tense and relax different muscle groups in the body.  With regular practice, progressive muscle relaxation gives you an  intimate familiarity with what tension--as well as complete relaxation--feels like in different parts of the body. This awareness helps you spot and counteract the first signs of the muscular tension that accompanies stress. And as your body relaxes, so will your mind. You can combine deep breathing with progressive muscle relaxation for an additional level of stress relief.  Practicing progressive muscle relaxation  Before practicing Progressive Muscle Relaxation, consult with your doctor if you have a history of muscle spasms, back problems, or other serious injuries that may be aggravated by tensing muscles.  Most progressive muscle relaxation practitioners start at the feet and work their way up to the face. For a sequence of muscle groups to follow, see the box below.  Loosen your clothing, take off your shoes, and get comfortable.   Take a few minutes to relax, breathing in and out in slow, deep breaths.   When you re relaxed and ready to start, shift your attention to your right foot. Take a moment to focus on the way it feels.   Slowly tense the muscles in your right foot, squeezing as tightly as you can. Hold for a count of 10.   Relax your right foot. Focus on the tension flowing away and the way your foot feels as it becomes limp and loose.   Stay in this relaxed state for a moment, breathing deeply and slowly.   When you re ready, shift your attention to your left foot. Follow the same sequence of muscle tension and release.   Move slowly up through your body, campbell and relaxing the muscle groups as you go.   It may take some practice at first, but try not to tense muscles other than those intended.   Progressive Muscle Relaxation Sequence  The most popular sequence runs as follows:  1. Right foot*   2. Left foot   3. Right calf   4. Left calf   5. Right thigh  6. Left thigh   7. Hips and buttocks   8. Stomach   9. Chest   10. Back  11. Right arm and hand   12. Left arm and hand   13. Neck and  shoulders   14. Face    * If you are left-handed you may want to begin with your left foot instead.  Relaxation technique 3: Body scan meditation for stress relief  A body scan is similar to progressive muscle relaxation except, instead of tensing and relaxing muscles, you simply focus on the sensations in each part of your body.   Practicing body scan meditation  Lie on your back, legs uncrossed, arms relaxed at your sides, eyes open or closed. Focus on your breathing , allowing your stomach to rise as you inhale and fall as you exhale. Breathe deeply for about two minutes, until you start to feel comfortable and relaxed.   Turn your focus to the toes of your right foot. Notice any sensations you feel while continuing to also focus on your breathing. Imagine each deep breath flowing to your toes. Remain focused on this area for one to two minutes.   Move your focus to the sole of your right foot. Tune in to any sensations you feel in that part of your body and imagine each breath flowing from the sole of your foot. After one or two minutes, move your focus to your right ankle and repeat. Move to your calf, knee, thigh, hip, and then repeat the sequence for your left leg. From there, move up the torso, through the lower back and abdomen, the upper back and chest, and the shoulders. Pay close attention to any area of the body that causes you pain or discomfort.   Move your focus to the fingers on your right hand and then move up to the wrist, forearm, elbow, upper arm, and shoulder. Repeat for your left arm. Then move through the neck and throat, and finally all the regions of your face, the back of the head, and the top of the head. Pay close attention to your jaw, chin, lips, tongue, nose, cheeks, eyes, forehead, temples and scalp. When you reach the very top of your head, let your breath reach out beyond your body and imagine yourself hovering above yourself.   After completing the body scan, relax for a while in  silence and stillness, noting how your body feels. Then open your eyes slowly. Take a moment to stretch, if necessary.   For a guided body scan meditation, see the Resources section below.  Relaxation technique 4: Mindfulness for stress relief  Mindfulness is the ability to remain aware of how you re feeling right now, your  [id number]  experience--both internal and external. Thinking about the past--blaming and judging yourself--or worrying about the future can often lead to a degree of stress that is overwhelming. But by staying calm and focused in the present moment, you can bring your nervous system back into balance. Mindfulness can be applied to activities such as walking, exercising, eating, or meditation.  Meditations that cultivate mindfulness have long been used to reduce overwhelming stress. Some of these meditations bring you into the present by focusing your attention on a single repetitive action, such as your breathing, a few repeated words, or flickering light from a candle. Other forms of mindfulness meditation encourage you to follow and then release internal thoughts or sensations.  Practicing mindfulness meditation  Key points in mindfulness mediation are:  A quiet environment. Choose a secluded place in your home, office, garden, place of [place]ian[place], or in the great outdoors where you can relax without distractions or interruptions.   A comfortable position. Get comfortable, but avoid lying down as this may lead to you falling asleep. Sit up with your spine straight, either in a chair or on the floor. You can also try a cross-legged or melo position.   A point of focus. This point can be internal - a feeling or imaginary scene - or something external - a flame or meaningful word or phrase that you repeat it throughout your session. You may meditate with eyes open or closed. Also choose to focus on an object in your surroundings to enhance your concentration, or alternately, you can close  your eyes.   An observant, noncritical attitude. Don t worry about distracting thoughts that go through your mind or about how well you re doing. If thoughts intrude during your relaxation session, don t fight them. Instead, gently turn your attention back to your point of focus.   Relaxation technique 5: Visualization meditation for stress relief  Visualization, or guided imagery, is a variation on traditional meditation that requires you to employ not only your visual sense, but also your sense of taste, touch, smell, and sound. When used as a relaxation technique, visualization involves imagining a scene in which you feel at peace, free to let go of all tension and anxiety.  Choose whatever setting is most calming to you, whether it s a tropical beach, a favorite childhood spot, or a quiet [place]ed [place]. You can do this visualization exercise on your own in silence, while listening to soothing music, or with a therapist (or an audio recording of a therapist) guiding you through the imagery. To help you employ your sense of hearing you can use a sound machine or download sounds that match your chosen setting--the sound of ocean waves if you ve chosen a beach, for example.  Practicing visualization  Find a quiet, relaxed place. Beginners sometimes fall asleep during a visualization meditation, so you might try sitting up or standing.  Close your eyes and let your worries drift away. Imagine your restful place. Picture it as vividly as you can--everything you can see, hear, smell, and feel. Visualization works best if you incorporate as many sensory details as possible, using at least three of your senses. When visualizing, choose imagery that appeals to you; don t select images because someone else suggests them, or because you think they should be appealing. Let your own images come up and work for you.  If you are thinking about a dock on a quiet lake, for example:   Walk slowly around the dock and notice the  colors and textures around you.   Spend some time exploring each of your senses.   See the sun setting over the water.   Hear the birds singing.   Smell the pine trees.   Feel the cool water on your bare feet.   Taste the fresh, clean air.   Enjoy the feeling of deep relaxation that envelopes you as you slowly explore your restful place. When you are ready, gently open your eyes and come back to the present.  Don't worry if you sometimes zone out or lose track of where you are during a guided imagery session. This is normal. You may also experience feelings of stiffness or heaviness in your limbs, minor, involuntary muscle-movements, or even cough or yawn. Again, these are normal responses.   Relaxation technique 6: Yoga and marlen chi for stress relief  Yoga involves a series of both moving and stationary poses, combined with deep breathing. As well as reducing anxiety and stress, yoga can also improve flexibility, strength, balance, and stamina. Practiced regularly, it can also strengthen the relaxation response in your daily life. Since injuries can happen when yoga is practiced incorrectly, it s best to learn by attending group classes, hiring a private teacher, or at least following video instructions.  What type of yoga is best for stress?  Although almost all yoga classes end in a relaxation pose, classes that emphasize slow, steady movement, deep breathing, and gentle stretching are best for stress relief.  Satyananda is a traditional form of yoga. It features gentle poses, deep relaxation, and meditation, making it suitable for beginners as well as anyone primarily looking for stress reduction.   Hatha yoga is also reasonably gentle way to relieve stress and is suitable for beginners. Alternately, look for labels like gentle, for stress relief, or for beginners when selecting a yoga class.   Power yoga, with its intense poses and focus on fitness, is better suited to those looking for stimulation as well as  relaxation.   If you re unsure whether a specific yoga class is appropriate for stress relief, call the studio or ask the teacher.  Chaz chi  If you ve ever seen a group of people in the park slowly moving in synch, you ve probably witnessed chaz chi. Chaz chi is a self-paced, non-competitive series of slow, flowing body movements. These movements emphasize concentration, relaxation, and the conscious circulation of vital energy throughout the body. Though chaz chi has its roots in martial arts, today it is primarily practiced as a way of calming the mind, conditioning the body, and reducing stress. As in meditation, chaz chi practitioners focus on their breathing and keeping their attention in the present moment.  Chaz chi is a safe, low-impact option for people of all ages and levels of fitness, including older adults and those recovering from injuries. Like yoga, once you ve learned the basics of chaz chi or qi gong, you can practice alone or with others, tailoring your sessions as you see fit.   Making relaxation techniques a part of your life  The best way to start and maintain a relaxation practice is to incorporate it into your daily routine. Between work, family, school, and other commitments, though, it can be tough for many people to find the time. Fortunately, many of the techniques can be practiced while you re doing other things.  Rhythmic exercise as a mindfulness relaxation technique  Rhythmic exercise--such as running, walking, rowing, or cycling--is most effective at relieving stress when performed with relaxation in mind. As with meditation, mindfulness requires being fully engaged in the present moment, focusing your mind on how your body feels right now. As you exercise, focus on the physicality of your body s movement and how your breathing complements that movement. If your mind wanders to other thoughts, gently return to focusing on your breathing and movement.  If walking or running, for example,  focus on each step--the sensation of your feet touching the ground, the rhythm of your breath while moving, and the feeling of the wind against your face.  Tips for fitting relaxation techniques into your life  If possible, schedule a set time to practice each day. Set aside one or two periods each day. You may find that it s easier to stick with your practice if you do it first thing in the morning, before other tasks and responsibilities get in the way.   Practice relaxation techniques while you re doing other things. Meditate while commuting to work on a bus or train, or waiting for a dentist appointment. Try deep breathing while you re doing housework or mowing the lawn. Mindfulness walking can be done while exercising your dog, walking to your car, or climbing the stairs at work instead of using the elevator. Once you ve learned techniques such as marlen chi, you can practice them in your office or in the park at lunchtime.   If you exercise, improve the relaxation benefits by adopting mindfulness. Instead of zoning out or staring at a TV as you exercise, try focusing your attention on your body. If you re resistance training, for example, focus on coordinating your breathing with your movements and pay attention to how your body feels as you raise and lower the weights.   Avoid practicing when you re sleepy. These techniques can relax you so much that they can make you very sleepy, especially if it s close to bedtime. You will get the most benefit if you practice when you re fully awake and alert. Do not practice after eating a heavy meal or while using drugs, tobacco, or alcohol.   Expect ups and downs. Don t be discouraged if you skip a few days or even a few weeks. It happens. Just get started again and slowly build up to your old momentum.

## 2021-02-01 NOTE — PROGRESS NOTES
SUBJECTIVE:                                                    Daphnie Yung is a 50 year old female who presents to clinic today for the following health issues:    Abnormal Mood Symptoms  Onset: Has been getting worse over the past two years    Description:   Depression: no   Anxiety: YES    Accompanying Signs & Symptoms:  Still participating in activities that you used to enjoy: YES  Fatigue: YES  Irritability: YES  Difficulty concentrating: YES  Changes in appetite: no, but she has lost weight  Problems with sleep: YES  Heart racing/beating fast : YES- at times  Thoughts of hurting yourself or others: none    History:   Recent stress: YES- her parents health is declining  Prior depression hospitalization: None  Family history of depression: no   Family history of anxiety: YES    Precipitating factors:   Alcohol/drug use: no     Alleviating factors:  None. She has tried to go for a walk but she says she just feel exhausted and she says it is coming from her chest.    Therapies Tried and outcome: None      -She is wanting to discuss possible thyroid issues.      Problem list and histories reviewed & adjusted, as indicated.  Additional history:     Patient Active Problem List   Diagnosis     Chronic rhinitis     Chronic diarrhea     Irritable bowel syndrome     Vegan diet     Migraine with aura and without status migrainosus, not intractable     Adjustment disorder with anxiety     Other insomnia     Past Surgical History:   Procedure Laterality Date     TONSILLECTOMY & ADENOIDECTOMY         Social History     Tobacco Use     Smoking status: Never Smoker     Smokeless tobacco: Never Used   Substance Use Topics     Alcohol use: No     Alcohol/week: 0.0 standard drinks     Comment: rarly on weekends     Family History   Problem Relation Age of Onset     Diabetes Maternal Grandfather      Thyroid Disease Paternal Grandmother      Eye Disorder Paternal Grandmother         cadiract     Hypertension No family hx of       "Cerebrovascular Disease No family hx of      Breast Cancer No family hx of      Cancer - colorectal No family hx of      Prostate Cancer No family hx of          Current Outpatient Medications   Medication Sig Dispense Refill     escitalopram (LEXAPRO) 10 MG tablet 1/2 tab for 1 week then 1 tab a day. 30 tablet 1     propranolol (INDERAL) 20 MG tablet Take 1 tablet (20 mg) by mouth 3 times daily as needed (panic attacks, palpitations) 30 tablet 1     rizatriptan (MAXALT) 10 MG tablet Reported on 5/11/2017       Allergies   Allergen Reactions     Cats      Dogs      Dust Mites      Nkda [No Known Drug Allergies]      Seasonal Allergies      Trees      Tree mold     Latex      Rash         ROS:  Constitutional, HEENT, cardiovascular, pulmonary, gi and gu systems are negative, except as otherwise noted.    OBJECTIVE:                                                    /83   Pulse 87   Temp 97.3  F (36.3  C) (Tympanic)   Resp 16   Ht 1.67 m (5' 5.75\")   Wt 58.2 kg (128 lb 3.2 oz)   SpO2 99%   BMI 20.85 kg/m   Body mass index is 20.85 kg/m .   GENERAL: healthy, alert, well nourished, well hydrated, no distress  HENT: ear canals- normal; TMs- normal; Nose- normal; Mouth- no ulcers, no lesions  NECK: no tenderness, no adenopathy, no asymmetry, no masses, no stiffness; thyroid- normal to palpation  RESP: lungs clear to auscultation - no rales, no rhonchi, no wheezes  CV: regular rates and rhythm, normal S1 S2, no S3 or S4 and no murmur, no click or rub -  ABDOMEN: soft, no tenderness, no  hepatosplenomegaly, no masses, normal bowel sounds       ASSESSMENT/PLAN:                                                      (F43.22) Adjustment disorder with anxiety  (primary encounter diagnosis)  Discussed self relaxation self cares  Plan: **TSH with free T4 reflex FUTURE anytime,         escitalopram (LEXAPRO) 10 MG tablet,         propranolol (INDERAL) 20 MG tablet      (R00.2) Palpitations  Most likely PVC  Plan: EKG " 12-lead complete w/read - Clinics,         propranolol (INDERAL) 20 MG tablet        (R63.4) Weight loss  Comment: likely stree related will check labs  montor for night sweats, fatigue or other systemic symptoms.\ recheck in 4 weeks   Plan: Comprehensive metabolic panel (BMP + Alb, Alk         Phos, ALT, AST, Total. Bili, TP), CBC with         platelets and differential         reports that she has never smoked. She has never used smokeless tobacco.      [facility]

## 2021-02-02 ASSESSMENT — ANXIETY QUESTIONNAIRES: GAD7 TOTAL SCORE: 17

## 2021-02-02 NOTE — TELEPHONE ENCOUNTER
Pt would like provider to review her labs and advise.     Reason for Disposition    Caller requesting lab results    Protocols used: PCP CALL - NO TRIAGE-A-AH

## 2021-02-25 ENCOUNTER — OFFICE VISIT (OUTPATIENT)
Dept: FAMILY MEDICINE | Facility: CLINIC | Age: 51
End: 2021-02-25
Payer: COMMERCIAL

## 2021-02-25 VITALS
WEIGHT: 128.3 LBS | DIASTOLIC BLOOD PRESSURE: 64 MMHG | BODY MASS INDEX: 20.87 KG/M2 | TEMPERATURE: 98.1 F | RESPIRATION RATE: 16 BRPM | HEART RATE: 72 BPM | SYSTOLIC BLOOD PRESSURE: 112 MMHG

## 2021-02-25 DIAGNOSIS — F43.22 ADJUSTMENT DISORDER WITH ANXIETY: ICD-10-CM

## 2021-02-25 PROCEDURE — 99213 OFFICE O/P EST LOW 20 MIN: CPT | Performed by: FAMILY MEDICINE

## 2021-02-25 RX ORDER — ESCITALOPRAM OXALATE 10 MG/1
10 TABLET ORAL DAILY
Qty: 90 TABLET | Refills: 3 | Status: SHIPPED | OUTPATIENT
Start: 2021-02-25 | End: 2021-03-09 | Stop reason: ALTCHOICE

## 2021-02-25 ASSESSMENT — PAIN SCALES - GENERAL: PAINLEVEL: NO PAIN (0)

## 2021-02-25 NOTE — PROGRESS NOTES
SUBJECTIVE:                                                    Dpahnie Yung is a 50 year old female who presents to clinic today for the following health issues:    Anxiety Follow-Up    How are you doing with your anxiety since your last visit? Improved     Are you having other symptoms that might be associated with anxiety? Yes:  nauseated within the first couple weeks of being on medication.    Have you had a significant life event? Grief or Loss of both her parents     Are you feeling depressed? No    Do you have any concerns with your use of alcohol or other drugs? No     She is doing much better.  Both her parents did pass away this week 4 days apart.     Social History     Tobacco Use     Smoking status: Never Smoker     Smokeless tobacco: Never Used   Substance Use Topics     Alcohol use: No     Alcohol/week: 0.0 standard drinks     Comment: rarly on weekends     Drug use: No     MERY-7 SCORE 2/7/2019 2/1/2021 2/26/2021   Total Score - - -   Total Score 10 17 6     PHQ 2/7/2019 2/1/2021 2/26/2021   PHQ-9 Total Score 5 5 2   Q9: Thoughts of better off dead/self-harm past 2 weeks Not at all Not at all Not at all       Wt Readings from Last 10 Encounters:   02/25/21 58.2 kg (128 lb 4.8 oz)   02/01/21 58.2 kg (128 lb 3.2 oz)   01/16/20 61.7 kg (136 lb)   09/26/19 61.2 kg (135 lb)   09/04/19 61.8 kg (136 lb 3.2 oz)   05/07/19 61.2 kg (135 lb)   04/09/19 61.2 kg (135 lb)   11/27/18 62.6 kg (137 lb 14.4 oz)   11/21/18 61.7 kg (136 lb)   11/11/18 63.1 kg (139 lb 3.2 oz)       Last PHQ-9 2/26/2021   1.  Little interest or pleasure in doing things 0   2.  Feeling down, depressed, or hopeless 0   3.  Trouble falling or staying asleep, or sleeping too much 1   4.  Feeling tired or having little energy 1   5.  Poor appetite or overeating 0   6.  Feeling bad about yourself 0   7.  Trouble concentrating 0   8.  Moving slowly or restless 0   Q9: Thoughts of better off dead/self-harm past 2 weeks 0   PHQ-9 Total Score 2    Difficulty at work, home, or with people -     MERY-7  2/26/2021   1. Feeling nervous, anxious, or on edge 2   2. Not being able to stop or control worrying 0   3. Worrying too much about different things 1   4. Trouble relaxing 1   5. Being so restless that it is hard to sit still 0   6. Becoming easily annoyed or irritable 1   7. Feeling afraid, as if something awful might happen 1   MERY-7 Total Score 6   If you checked any problems, how difficult have they made it for you to do your work, take care of things at home, or get along with other people? -         How many servings of fruits and vegetables do you eat daily?  2-3    On average, how many sweetened beverages do you drink each day (Examples: soda, juice, sweet tea, etc.  Do NOT count diet or artificially sweetened beverages)?   0    How many days per week do you exercise enough to make your heart beat faster? 3 or less    How many minutes a day do you exercise enough to make your heart beat faster? 9 or less    How many days per week do you miss taking your medication? 0      Problem list and histories reviewed & adjusted, as indicated.  Additional history:     Patient Active Problem List   Diagnosis     Chronic rhinitis     Chronic diarrhea     Irritable bowel syndrome     Vegan diet     Migraine with aura and without status migrainosus, not intractable     Adjustment disorder with anxiety     Other insomnia     Past Surgical History:   Procedure Laterality Date     TONSILLECTOMY & ADENOIDECTOMY         Social History     Tobacco Use     Smoking status: Never Smoker     Smokeless tobacco: Never Used   Substance Use Topics     Alcohol use: No     Alcohol/week: 0.0 standard drinks     Comment: rarly on weekends     Family History   Problem Relation Age of Onset     Diabetes Maternal Grandfather      Thyroid Disease Paternal Grandmother      Eye Disorder Paternal Grandmother         cadiract     Hypertension No family hx of      Cerebrovascular Disease No  family hx of      Breast Cancer No family hx of      Cancer - colorectal No family hx of      Prostate Cancer No family hx of          Current Outpatient Medications   Medication Sig Dispense Refill     escitalopram (LEXAPRO) 10 MG tablet Take 1 tablet (10 mg) by mouth daily 90 tablet 3     rizatriptan (MAXALT) 10 MG tablet Reported on 5/11/2017       propranolol (INDERAL) 20 MG tablet Take 1 tablet (20 mg) by mouth 3 times daily as needed (panic attacks, palpitations) (Patient not taking: Reported on 2/25/2021) 30 tablet 1     Allergies   Allergen Reactions     Cats      Dogs      Dust Mites      Nkda [No Known Drug Allergies]      Seasonal Allergies      Trees      Tree mold     Latex      Rash         ROS:  Constitutional, HEENT, cardiovascular, pulmonary, gi and gu systems are negative, except as otherwise noted.    OBJECTIVE:                                                    /64   Pulse 72   Temp 98.1  F (36.7  C) (Tympanic)   Resp 16   Wt 58.2 kg (128 lb 4.8 oz)   BMI 20.87 kg/m   Body mass index is 20.87 kg/m .   GENERAL: healthy, alert, well nourished, well hydrated, no distress  HENT: ear canals- normal; TMs- normal; Nose- normal; Mouth- no ulcers, no lesions  NECK: no tenderness, no adenopathy, no asymmetry, no masses, no stiffness; thyroid- normal to palpation  RESP: lungs clear to auscultation - no rales, no rhonchi, no wheezes  CV: regular rates and rhythm, normal S1 S2, no S3 or S4 and no murmur, no click or rub -  ABDOMEN: soft, no tenderness, no  hepatosplenomegaly, no masses, normal bowel sounds       ASSESSMENT/PLAN:                                                    (F43.22) Adjustment disorder with anxiety  Much improved.   Has high stress now but doing well.  Discussed recheck regularly over the next few months as she goes thru this transition and she benefited a lot from this visit.   Good control.  Continue currant medications, continue to monito   Plan: escitalopram (LEXAPRO) 10  MG tablet            reports that she has never smoked. She has never used smokeless tobacco.      Wt Readings from Last 10 Encounters:   02/25/21 58.2 kg (128 lb 4.8 oz)   02/01/21 58.2 kg (128 lb 3.2 oz)   01/16/20 61.7 kg (136 lb)   09/26/19 61.2 kg (135 lb)   09/04/19 61.8 kg (136 lb 3.2 oz)   05/07/19 61.2 kg (135 lb)   04/09/19 61.2 kg (135 lb)   11/27/18 62.6 kg (137 lb 14.4 oz)   11/21/18 61.7 kg (136 lb)   11/11/18 63.1 kg (139 lb 3.2 oz)     Her unintended weight loss seams to have stabalized.  Will monitor.   [facility]

## 2021-02-26 ASSESSMENT — ANXIETY QUESTIONNAIRES
2. NOT BEING ABLE TO STOP OR CONTROL WORRYING: NOT AT ALL
5. BEING SO RESTLESS THAT IT IS HARD TO SIT STILL: NOT AT ALL
GAD7 TOTAL SCORE: 6
3. WORRYING TOO MUCH ABOUT DIFFERENT THINGS: SEVERAL DAYS
6. BECOMING EASILY ANNOYED OR IRRITABLE: SEVERAL DAYS
1. FEELING NERVOUS, ANXIOUS, OR ON EDGE: MORE THAN HALF THE DAYS
7. FEELING AFRAID AS IF SOMETHING AWFUL MIGHT HAPPEN: SEVERAL DAYS

## 2021-02-26 ASSESSMENT — PATIENT HEALTH QUESTIONNAIRE - PHQ9
5. POOR APPETITE OR OVEREATING: SEVERAL DAYS
SUM OF ALL RESPONSES TO PHQ QUESTIONS 1-9: 2

## 2021-02-27 ASSESSMENT — ANXIETY QUESTIONNAIRES: GAD7 TOTAL SCORE: 6

## 2021-03-09 ENCOUNTER — TELEPHONE (OUTPATIENT)
Dept: FAMILY MEDICINE | Facility: CLINIC | Age: 51
End: 2021-03-09

## 2021-03-09 DIAGNOSIS — F43.22 ADJUSTMENT DISORDER WITH ANXIETY: Primary | ICD-10-CM

## 2021-03-09 RX ORDER — ESCITALOPRAM OXALATE 10 MG/1
10 TABLET ORAL DAILY
Qty: 90 TABLET | Refills: 1 | Status: SHIPPED | OUTPATIENT
Start: 2021-03-09 | End: 2021-03-09

## 2021-03-09 RX ORDER — ESCITALOPRAM OXALATE 10 MG/1
10 TABLET ORAL DAILY
Qty: 90 TABLET | Refills: 1 | Status: SHIPPED | OUTPATIENT
Start: 2021-03-09 | End: 2021-09-07

## 2021-03-09 NOTE — TELEPHONE ENCOUNTER
Reason for call:  Other   Patient called regarding (reason for call): call back  Additional comments: Questions regarding medication, changed brands and is having a few different side effects. Wondering if she should change?    Phone number to reach patient:  Home number on file [phone] (home)    Best Time:  Anytime    Can we leave a detailed message on this number?  YES    Travel screening: Not Applicable

## 2021-03-09 NOTE — TELEPHONE ENCOUNTER
Reviewed with nursing.  If pt feels this is her typical migraine and she would normally treat with her Maxalt she can go ahead and do so now.  If her HA does not improve as it normally does or she has other persistent or worsening symptoms she can seek care in the ED.    New script sent to [facility] for escitalopram    Emani Cleaning DO

## 2021-03-09 NOTE — CONFIDENTIAL NOTE
"So every pharmacy works with different manufacturers so although the medication itself (ie the lexapro or escitalopram) will not (should not) be different, sometimes the \"fillers\" that manufacturers used can be just different enough that the \"same\" medication does not seem to work as well or causes different side effects.     If that is truly the only thing that changed then if possible I would see if she could switch back to filling at the [place] pharmacy? Does she still have the propranolol and does that help with the racing heart, etc.?    Teresa Aguilar PA-C    "

## 2021-03-09 NOTE — TELEPHONE ENCOUNTER
1Called Daphnie and informed her of all of Teresa's explanantions as noted below. Daphnie would like escitalopram to be re-ordered at [facility] with notation to make sure it is the same brand as it was when ordered on  2/1/21.  2. Daphnie says that she has a full bottle of propranolol as she has never taken it.    Daphnie reports that she last took the escitalpram at 1 PM today. Starting at 3 PM; has developed nausea, headache, and dizziness. Temp of 98.4.   Normally would take a rizatriptan right now.   She wants to know is she should try to treat the migraine or what she shoulod do in case this is not a migraine but a reaction to escitalopram?  Roberto Martin RN

## 2021-03-09 NOTE — CONFIDENTIAL NOTE
"Please advise in 's absence.   Daphnie reports that she started escitalopram after her visit with Dr. Bautista on 2/1/21. She picked up medication at the [facility]. She says that  these escitalopram pills worked well. She said that she felt \"Smooth/calm\" in February, even through the loss of both of her parents. She said they had been  for 65 years and passed away within 4 days of each other last month.       After being seen on 2/25/21; she picked up escitalopram pills from [facility].    Daphnie reports that today is the 4th day of taking these pills.    She reports that she feels increased anxiety the past 2 days  Starting to get a headache. Can hear her heartbeat again. Waking up at night.   \"This is the feeling I was hoping to get away from and it seems like it is coming back since I started the pills from [facility].\"    How do you advise?  Thank you.    Roberto Martin RN    "

## 2021-03-09 NOTE — TELEPHONE ENCOUNTER
Message handled by Nurse Triage with [facility] - provider name: Black   Patient advised OK to take rizaptriptan for headache today. Escitalopram ordered to [facility] with instructions to dispense same brand that was ordered on 2/1/21. Patient advised if headache and anxiety and any other symptoms worsen; to have someone take her to the ED today.  Daphnie agreed with plan.  Roberto Martin RN

## 2021-03-09 NOTE — TELEPHONE ENCOUNTER
"Please advise in 's absence.   Daphnie reports that she started escitalopram after her visit with Dr. Bautista on 2/1/21. She picked up medication at the [facility]. She says that  these escitalopram pills worked well. She said that she felt \"Smooth/calm\" in February, even through the loss of both of her parents. She said they had been  for 65 years and passed away within 4 days of each other last month.       After being seen on 2/25/21; she picked up escitalopram pills from [facility].    Daphnie reports that today is the 4th day of taking these pills.    She reports that she feels increased anxiety the past 2 days  Starting to get a headache. Can hear her heartbeat again. Waking up at night.   \"This is the feeling I was hoping to get away from and it seems like it is coming back since I started the pills from [facility].\"    How do you advise?  Thank you.    Roberto Martin RN      "

## 2021-04-06 ENCOUNTER — TRANSFERRED RECORDS (OUTPATIENT)
Dept: FAMILY MEDICINE | Facility: CLINIC | Age: 51
End: 2021-04-06

## 2021-04-10 ENCOUNTER — HEALTH MAINTENANCE LETTER (OUTPATIENT)
Age: 51
End: 2021-04-10

## 2021-06-25 ENCOUNTER — TELEPHONE (OUTPATIENT)
Dept: FAMILY MEDICINE | Facility: CLINIC | Age: 51
End: 2021-06-25

## 2021-06-25 NOTE — TELEPHONE ENCOUNTER
Dr. Romero replied to patient via [facility] in another encounter.   Closing encounter    Waldo Tao RN

## 2021-06-25 NOTE — TELEPHONE ENCOUNTER
Patient called the clinic     Patient reports 2 days ago she had gotten bit by some sort of insect  States insect bite was to her right lower leg on the lateral side  Patient denies seeing what bit her / denies noting a tick to the area  Patient reports skin is smooth and does not feel any bumps under the skin at the bite site    Reports the redness is approx the size of a quarter  Reports redness at the bite site is slightly warmer than surrounding skin     Reports site was very itchy at the start and now noted bite site has a slight burning sensation     Denies a bullseye beto at bite site  Denies open area  Denies drainage from bite  Denies fevers    Has used OTC Benadryl Cream - has provided relief from itching / burning  Wanted to address prior to the weekend if further recommendations needed.    Will route to Dr. Romero as Dr. Bautista is not in clinic today    Waldo Tao RN

## 2021-09-02 ENCOUNTER — OFFICE VISIT (OUTPATIENT)
Dept: FAMILY MEDICINE | Facility: CLINIC | Age: 51
End: 2021-09-02
Payer: COMMERCIAL

## 2021-09-02 VITALS
HEIGHT: 66 IN | TEMPERATURE: 97.1 F | SYSTOLIC BLOOD PRESSURE: 118 MMHG | WEIGHT: 140 LBS | HEART RATE: 82 BPM | BODY MASS INDEX: 22.5 KG/M2 | OXYGEN SATURATION: 97 % | DIASTOLIC BLOOD PRESSURE: 64 MMHG

## 2021-09-02 DIAGNOSIS — R23.3 BLEEDING SKIN MOLE: Primary | ICD-10-CM

## 2021-09-02 DIAGNOSIS — D22.9 BLEEDING SKIN MOLE: Primary | ICD-10-CM

## 2021-09-02 PROCEDURE — 99213 OFFICE O/P EST LOW 20 MIN: CPT | Performed by: NURSE PRACTITIONER

## 2021-09-02 ASSESSMENT — PAIN SCALES - GENERAL: PAINLEVEL: NO PAIN (0)

## 2021-09-02 ASSESSMENT — ENCOUNTER SYMPTOMS: CONSTITUTIONAL NEGATIVE: 1

## 2021-09-02 ASSESSMENT — MIFFLIN-ST. JEOR: SCORE: 1252.82

## 2021-09-02 NOTE — PROGRESS NOTES
"    Assessment & Plan     Bleeding skin mole  Dermatology referral placed. Given location, would prefer derm do biopsy to cosmetically it looks better. She states she has appt for full body scan to have other areas looked at. Since it has not continued to bleed, likely benign.     - Adult Dermatology Referral; Future             Patient Instructions   Dermatology referral placed.       Return if symptoms worsen or fail to improve.    CRISSY Kirby CNP  M [facility] THONY Eller is a 51 year old who presents for the following health issues     HPI       Patient is here today to get a mole looked at. It is on her right upper chest area. She did notice it bleed 2 days ago which made her worry. She was not able to get into a dermatologist but if you are concerned about it she can get in to her dermatology place quicker.    Additional provider notes: Has a right upper chest mole that was bleeding one day after carrying grandkids. States she wasn't aware of him hitting/catching on it. It is where her bra strap hits. She is able to get into derm in 1 month, but here for referral to get in sooner if able.      Allergies   Allergen Reactions     Cats      Dogs      Dust Mites      Nkda [No Known Drug Allergies]      Seasonal Allergies      Trees      Tree mold     Latex      Rash       Current Outpatient Medications   Medication     escitalopram (LEXAPRO) 10 MG tablet     propranolol (INDERAL) 20 MG tablet     rizatriptan (MAXALT) 10 MG tablet     No current facility-administered medications for this visit.     Past Medical History:   Diagnosis Date     NO ACTIVE PROBLEMS        Review of Systems   Constitutional: Negative.    Skin:        Small right upper chest raised mole            Objective    /64   Pulse 82   Temp 97.1  F (36.2  C) (Tympanic)   Ht 1.67 m (5' 5.75\")   Wt 63.5 kg (140 lb)   SpO2 97%   BMI 22.77 kg/m    Body mass index is 22.77 kg/m .  Physical Exam  Vitals and " nursing note reviewed.   Constitutional:       General: She is not in acute distress.     Appearance: Normal appearance. She is not ill-appearing or toxic-appearing.   Skin:     General: Skin is warm and dry.          Neurological:      Mental Status: She is alert and oriented to person, place, and time.   Psychiatric:         Behavior: Behavior normal.

## 2021-09-05 DIAGNOSIS — F43.22 ADJUSTMENT DISORDER WITH ANXIETY: ICD-10-CM

## 2021-09-07 RX ORDER — ESCITALOPRAM OXALATE 10 MG/1
10 TABLET ORAL DAILY
Qty: 90 TABLET | Refills: 0 | Status: SHIPPED | OUTPATIENT
Start: 2021-09-07 | End: 2021-12-08

## 2021-09-07 NOTE — TELEPHONE ENCOUNTER
"Prescription approved per [facility] Refill Protocol.  Requested Prescriptions   Pending Prescriptions Disp Refills     escitalopram (LEXAPRO) 10 MG tablet [Pharmacy Med Name: ESCITALOPRAM OXALATE 10MG TABS] 90 tablet 0     Sig: TAKE 1 TABLET (10 MG) BY MOUTH DAILY       SSRIs Protocol Passed - 9/5/2021 12:20 PM        Passed - Recent (12 mo) or future (30 days) visit within the authorizing provider's specialty     Patient has had an office visit with the authorizing provider or a provider within the authorizing providers department within the previous 12 mos or has a future within next 30 days. See \"Patient Info\" tab in inbasket, or \"Choose Columns\" in Meds & Orders section of the refill encounter.              Passed - Medication is active on med list        Passed - Patient is age 18 or older        Passed - No active pregnancy on record        Passed - No positive pregnancy test in last 12 months             "

## 2021-09-10 ENCOUNTER — TELEPHONE (OUTPATIENT)
Dept: FAMILY MEDICINE | Facility: CLINIC | Age: 51
End: 2021-09-10

## 2021-09-10 NOTE — TELEPHONE ENCOUNTER
You placed a referral to dermatology on 9/2/21 for Daphnie.  Do you recall the location to complete the referral?  You can send message directly back to me.  Thank you!  Minoo Melo/[facility] referrals.

## 2021-09-14 NOTE — TELEPHONE ENCOUNTER
Manuel Hennessy,    I believe she was going to go to her own dermatologist, but I placed an external referral in the system in case they needed it. Please contact patient to clarify if needed.     Thanks,  Doretha Dorantes DNP, NP-C 9/14/2021 12:45 PM

## 2021-09-22 ENCOUNTER — TELEPHONE (OUTPATIENT)
Dept: FAMILY MEDICINE | Facility: CLINIC | Age: 51
End: 2021-09-22

## 2021-09-22 NOTE — TELEPHONE ENCOUNTER
Reason for Call:  Medication refill:    Do you use a [facility]?  Name of the pharmacy and phone number for the current request:  [facility]    Name of the medication requested: OTC med for Allergies    Other request: Patient is on Excitalopram and Rizatriptine, patient wants to know what she can take over the counter for allergies?    Can we leave a detailed message on this number? YES    Phone number patient can be reached at: Home number on file [phone] (home)    Best Time: Any    Call taken on 9/22/2021 at 3:42 PM by Sierra Garcia

## 2021-09-23 NOTE — TELEPHONE ENCOUNTER
Please let patient know that she can take Claritin and Flonase with both of those medications. I would stay away from Zyrtec.     Thanks,  Doretha Dorantes DNP, NP-C 9/23/2021 1:29 PM

## 2021-09-24 NOTE — TELEPHONE ENCOUNTER
Patient called back and was notified with good understanding.    Sade Boles on 9/24/2021 at 10:42 AM

## 2021-09-25 ENCOUNTER — HEALTH MAINTENANCE LETTER (OUTPATIENT)
Age: 51
End: 2021-09-25

## 2021-11-04 ENCOUNTER — OFFICE VISIT (OUTPATIENT)
Dept: FAMILY MEDICINE | Facility: CLINIC | Age: 51
End: 2021-11-04
Payer: COMMERCIAL

## 2021-11-04 VITALS
WEIGHT: 145 LBS | OXYGEN SATURATION: 97 % | BODY MASS INDEX: 23.3 KG/M2 | SYSTOLIC BLOOD PRESSURE: 110 MMHG | RESPIRATION RATE: 16 BRPM | HEIGHT: 66 IN | TEMPERATURE: 98.5 F | DIASTOLIC BLOOD PRESSURE: 68 MMHG | HEART RATE: 87 BPM

## 2021-11-04 DIAGNOSIS — J01.90 ACUTE NON-RECURRENT SINUSITIS, UNSPECIFIED LOCATION: Primary | ICD-10-CM

## 2021-11-04 PROCEDURE — 99213 OFFICE O/P EST LOW 20 MIN: CPT | Performed by: NURSE PRACTITIONER

## 2021-11-04 ASSESSMENT — ENCOUNTER SYMPTOMS
SINUS PRESSURE: 1
COUGH: 1
FEVER: 1
RHINORRHEA: 0
HEADACHES: 1
GASTROINTESTINAL NEGATIVE: 1
WHEEZING: 1
MYALGIAS: 1

## 2021-11-04 ASSESSMENT — MIFFLIN-ST. JEOR: SCORE: 1275.5

## 2021-11-04 NOTE — PROGRESS NOTES
Assessment & Plan     Acute non-recurrent sinusitis, unspecified location  Symptoms consistent with sinusitis. Side effects, risks and benefits of medication were discussed with patient. Discussed how and when to take medication. Recommended taking a probiotic and/or eating yogurt every day while on antibiotics and for ~1 week after stopping antibiotics to prevent GI upset. Discussed OTC recommendations for symptom control. Rest, hydration, humidification. Follow-up if symptoms do not improve after abx.     - amoxicillin-clavulanate (AUGMENTIN) 875-125 MG tablet; Take 1 tablet by mouth 2 times daily for 7 days             Patient Instructions   You have a sinus infection.  1. Take antibiotics as prescribed for the full course.  2. Take a probiotic and/or eat yogurt daily while on antibiotics and ~1 week after to prevent GI upset.  3. Continue to use OTC medications for symptom relief.   4. Rest and stay hydrated.  5. Use a humidifier in the bedroom, warm compresses on the face, and steam inhalation.  6. If symptoms worsen or do not improve within 1 week, please follow-up with your PCP.        Return if symptoms worsen or fail to improve.    CRISSY Kirby CNP  M [facility] THONY Eller is a 51 year old who presents for the following health issues     HPI     Acute Illness  Acute illness concerns: x 3 months - cough, sinus issues  Onset/Duration: x 3 months  Symptoms:  Fever: no  Chills/Sweats: no  Headache (location?): YES  Sinus Pressure: YES  Conjunctivitis:  no  Ear Pain: YES: right  Rhinorrhea: no  Congestion: YES  Sore Throat: no  Cough: YES-productive of clear sputum  Wheeze: YES  Decreased Appetite: no  Nausea: no  Vomiting: no  Diarrhea: no  Dysuria/Freq.: no  Dysuria or Hematuria: no  Fatigue/Achiness: YES  Sick/Strep Exposure: no - unknown  Therapies tried and outcome: None    Additional provider notes: Has hx of sinus infection after allergy flare ups. Recently flew on a  "plane and right ear was very painful. Now grandson is sick with cold. States she's ready to treat this since it's been ongoing for 3 months.     Negative COVID tests on 09/03/2021 and last week.     Review of Systems   Constitutional: Positive for fever.   HENT: Positive for congestion, postnasal drip and sinus pressure. Negative for rhinorrhea.    Respiratory: Positive for cough and wheezing.    Gastrointestinal: Negative.    Musculoskeletal: Positive for myalgias.   Neurological: Positive for headaches.            Objective    /68 (BP Location: Right arm, Patient Position: Sitting, Cuff Size: Adult Large)   Pulse 87   Temp 98.5  F (36.9  C) (Tympanic)   Resp 16   Ht 1.67 m (5' 5.75\")   Wt 65.8 kg (145 lb)   LMP 01/01/2020   SpO2 97%   Breastfeeding No   BMI 23.58 kg/m    Body mass index is 23.58 kg/m .  Physical Exam  Vitals and nursing note reviewed.   Constitutional:       General: She is not in acute distress.     Appearance: Normal appearance. She is not ill-appearing or toxic-appearing.   HENT:      Head: Normocephalic and atraumatic.      Right Ear: Ear canal and external ear normal. Tympanic membrane is bulging. Tympanic membrane is not erythematous.      Left Ear: Ear canal and external ear normal. Tympanic membrane is bulging. Tympanic membrane is not erythematous.      Nose: Congestion and rhinorrhea present.      Mouth/Throat:      Pharynx: No posterior oropharyngeal erythema.   Cardiovascular:      Rate and Rhythm: Normal rate and regular rhythm.      Pulses: Normal pulses.      Heart sounds: Normal heart sounds.   Pulmonary:      Effort: Pulmonary effort is normal.      Breath sounds: Normal breath sounds.   Skin:     General: Skin is warm and dry.   Neurological:      Mental Status: She is alert and oriented to person, place, and time.   Psychiatric:         Behavior: Behavior normal.                          "

## 2021-11-04 NOTE — PATIENT INSTRUCTIONS
You have a sinus infection.  1. Take antibiotics as prescribed for the full course.  2. Take a probiotic and/or eat yogurt daily while on antibiotics and ~1 week after to prevent GI upset.  3. Continue to use OTC medications for symptom relief.   4. Rest and stay hydrated.  5. Use a humidifier in the bedroom, warm compresses on the face, and steam inhalation.  6. If symptoms worsen or do not improve within 1 week, please follow-up with your PCP.

## 2021-11-16 ENCOUNTER — MYC MEDICAL ADVICE (OUTPATIENT)
Dept: FAMILY MEDICINE | Facility: CLINIC | Age: 51
End: 2021-11-16
Payer: COMMERCIAL

## 2021-11-23 ENCOUNTER — TELEPHONE (OUTPATIENT)
Dept: FAMILY MEDICINE | Facility: CLINIC | Age: 51
End: 2021-11-23

## 2021-11-23 ENCOUNTER — VIRTUAL VISIT (OUTPATIENT)
Dept: FAMILY MEDICINE | Facility: CLINIC | Age: 51
End: 2021-11-23
Payer: COMMERCIAL

## 2021-11-23 DIAGNOSIS — K92.1 BLOOD IN STOOL: Primary | ICD-10-CM

## 2021-11-23 PROCEDURE — 99213 OFFICE O/P EST LOW 20 MIN: CPT | Mod: GT | Performed by: FAMILY MEDICINE

## 2021-11-23 NOTE — PROGRESS NOTES
Daphnie is a 51 year old who is being evaluated via a billable video visit.      How would you like to obtain your AVS? MyChart  If the video visit is dropped, the invitation should be resent by: Text to cell phone: [phone]  Will anyone else be joining your video visit? No     VIDEO VISIT  Due to current COVID19 pandemic, pt was offered virtual visit in lieu of in office evaluation to limit exposures.      Assessment/plan  Daphnie Yung is a 51 year old female who is a patient of [facility].    Blood in stool  Hx certainly c/w known mild case of bleeding internal hemorrhoids in setting of diarrhea side effect s/t recent abx use. At this point reassurances provided; red flag sx/signs for earlier follow up also reviewed. Pt was appreciative and denies any concerns other than looking for reassurance today. WE reviewed her colonoscopy is UTD from 2013; not due until 20213 but to f/u with PCP if sx continue or worsen.   She does not require labs  Advised increasing stool bulk with metamucil or dietary fiber  Stay hydrated  Avoid constipation as well     Reviewed note from 11/4/2021  Reviewed HCM/colonoscopy hx    COVID19 precautions reviewed, questions answered. Referred to CDC for latest updates.     Follow up:  1month prn or sooner if worsening  Melissa Dockery MD    Video-Visit Details- see CA note for consent  Video visit start time: 2:20pm  Video visit end time: 233pm  Total time: 213min  Originating Location (pt. Location): Home  Distant Location (provider location):  [facility]   Mode of Communication:  Video Conference via [employer]        Subjective:      HPI: Daphnie Yung is a 51 year old female who is being evaluated via a billable video visit due to COVID19 pandemic precautions.    Chief Complaint   Patient presents with     Blood in stool     for a few days now, has history of internal and external hemmorroids, she was on augmentin for 10 days and had diarrhea as a  side effect       Blood in stool: Was in the clinic Nov 4th for sinus infection; on augmentin for 10 days. From day 1 she had stomach issues and diarrhea daily. However since the augmentin has ended she still has some diarrhea   Now in the past 3 days she has noticed blood in the stool-  Bright fresh red blood on kleenex and period like color red blood on the outside of her stools. It's with every BM    Hx of GI food intolerances- lactose intolerance  Hx of internal and external hemorhoids - last colonoscopy in 2013 (normal and was given 10years to follow up). Went back in 2016 to [facility] due to blood in stools and that was clinically confirmed as bleeding internal hemorrhoids.     Review of Systems:  No fevers chills or abdominal pain or cramping  Occasional hot flashes (from her menopoause)  No unexplained weight   12 point comprehensive review of systems was negative except as noted and HPI     Social History:  No smoking    Medical History:   I have reviewed and updated the patient's Past Medical History, Social History, Family History and Medication List.    Medications:  Current Outpatient Medications   Medication Sig Dispense Refill     escitalopram (LEXAPRO) 10 MG tablet TAKE 1 TABLET (10 MG) BY MOUTH DAILY 90 tablet 0     rizatriptan (MAXALT) 10 MG tablet Reported on 5/11/2017       propranolol (INDERAL) 20 MG tablet Take 1 tablet (20 mg) by mouth 3 times daily as needed (panic attacks, palpitations) (Patient not taking: Reported on 2/25/2021) 30 tablet 1       Imaging & Labs reviewed this visit:   No results found for: HGBA1C  Lab Results   Component Value Date    TSH 1.42 02/01/2021     No results found for: CREATININE  No components found for: K      Objective:        Physical Exam:     General: Appears well, no acute distress.   HEET: normocephalic conjunctivae are clear  Neck: supple   Lungs: Normal respiratory effort. No audible wheezing.   Heart: No visible JVD, no visible LE swelling  Abdomen:  comfortable during routine conversation without guarding   Skin: clear without rash or lesions  Neuro: alert    Melissa Dockery MD    Answers for HPI/ROS submitted by the patient on 11/23/2021  How many servings of fruits and vegetables do you eat daily?: 0-1  On average, how many sweetened beverages do you drink each day (Examples: soda, juice, sweet tea, etc.  Do NOT count diet or artificially sweetened beverages)?: 1  How many minutes a day do you exercise enough to make your heart beat faster?: 9 or less  How many days a week do you exercise enough to make your heart beat faster?: 3 or less  How many days per week do you miss taking your medication?: 0

## 2021-11-23 NOTE — TELEPHONE ENCOUNTER
Reason for Call:  Patient calling with follow up question from appointment today.  Patient would like to know if there is any reason after her appointment today that she should not get the covid booster.   She is scheduled to receive a Moderna booster on 11/26.  Patient states that she received the J&J vaccine in March.     Phone Number Patient can be reached at: Home number on file [phone] (home)    Best Time: anytime    Can we leave a detailed message on this number? YES    Call taken on 11/23/2021 at 2:38 PM by Misa Dennis

## 2021-12-07 DIAGNOSIS — F43.22 ADJUSTMENT DISORDER WITH ANXIETY: ICD-10-CM

## 2021-12-08 RX ORDER — ESCITALOPRAM OXALATE 10 MG/1
TABLET ORAL
Qty: 90 TABLET | Refills: 0 | Status: SHIPPED | OUTPATIENT
Start: 2021-12-08 | End: 2022-03-09

## 2021-12-13 ENCOUNTER — TELEPHONE (OUTPATIENT)
Dept: FAMILY MEDICINE | Facility: CLINIC | Age: 51
End: 2021-12-13
Payer: COMMERCIAL

## 2021-12-13 NOTE — TELEPHONE ENCOUNTER
Reason for call:  Patient reporting a symptom    Symptom or request: Pt has been having Red Blood in her stool. Pt did a virtual visit 11/23/21 has had problems with this in the past but it's getting worse. Please Advise.     Phone Number patient can be reached at:  Home number on file [phone] (home)    Best Time:  Any Time      Can we leave a detailed message on this number:  YES    Call taken on 12/13/2021 at 12:57 PM by Saida Silva

## 2021-12-13 NOTE — TELEPHONE ENCOUNTER
"Call placed to patient to triage symptoms.  Patient states she has had rectal bleeding occassionally for weeks.  Patient reports \"GI issues\" for years, dairy intolerant and history of frequent loose stool when food \"doesn't agree\" with her.  Patient reports internal and external  Hemorrhoids.    Patient states she has had 1 week of \"skinnier\" stools and taking longer to have BM. Feels constipated the last several days, having \"stomach ache\" denies pain, just achy.  Today patient reports bright red blood in toilet after BM.  Denies clots.  One episode of rectal bleeding today.  Patient states this is a larger amount than had been over the past week.  Reports having a headache this morning, resolved after taking migraine medication.  Patient states she felt slightly light headed today, denies at this time.    Patient called [facility] as she has seen them in the past.  They had an appointment available tomorrow at 0940 and patient took this appointment.    Reviewed red flag signs that would need ED assessment-  If rectal bleeding(bright red)is continuous, does not stop.  If light headed, chest pain or SOA or severe abdominal pain.  Advised patient to push po fluids/water.  Small amounts of bland food as tolerated, fluid more important.  Patient verbalized understanding and agrees.  Yvette Nolasco RN     "

## 2021-12-30 ENCOUNTER — ALLIED HEALTH/NURSE VISIT (OUTPATIENT)
Dept: FAMILY MEDICINE | Facility: CLINIC | Age: 51
End: 2021-12-30
Payer: COMMERCIAL

## 2021-12-30 DIAGNOSIS — Z23 NEED FOR VACCINATION: Primary | ICD-10-CM

## 2021-12-30 PROCEDURE — 90714 TD VACC NO PRESV 7 YRS+ IM: CPT

## 2021-12-30 PROCEDURE — 90471 IMMUNIZATION ADMIN: CPT

## 2021-12-30 PROCEDURE — 99207 PR NO CHARGE NURSE ONLY: CPT

## 2021-12-30 NOTE — PROGRESS NOTES
No chief complaint on file.    Prior to immunization administration, verified patients identity using patient s name and date of birth. Please see Immunization Activity for additional information.     Screening Questionnaire for Adult Immunization    Are you sick today?   No   Do you have allergies to medications, food, a vaccine component or latex?   Yes latex   Have you ever had a serious reaction after receiving a vaccination?   No   Do you have a long-term health problem with heart, lung, kidney, or metabolic disease (e.g., diabetes), asthma, a blood disorder, no spleen, complement component deficiency, a cochlear implant, or a spinal fluid leak?  Are you on long-term aspirin therapy?   No   Do you have cancer, leukemia, HIV/AIDS, or any other immune system problem?   No   Do you have a parent, brother, or sister with an immune system problem?   No   In the past 3 months, have you taken medications that affect  your immune system, such as prednisone, other steroids, or anticancer drugs; drugs for the treatment of rheumatoid arthritis, Crohn s disease, or psoriasis; or have you had radiation treatments?   No   Have you had a seizure, or a brain or other nervous system problem?   No   During the past year, have you received a transfusion of blood or blood    products, or been given immune (gamma) globulin or antiviral drug?   No   For women: Are you pregnant or is there a chance you could become       pregnant during the next month?   No   Have you received any vaccinations in the past 4 weeks?   No     Immunization questionnaire answers were all negative.        Per orders of Dr. duffy, injection of td given by Kelly Samaniego CMA. Patient instructed to remain in clinic for 15 minutes afterwards, and to report any adverse reaction to me immediately.       Screening performed by Kelly Samaniego CMA on 12/30/2021 at 9:10 AM.

## 2022-01-15 ENCOUNTER — HEALTH MAINTENANCE LETTER (OUTPATIENT)
Age: 52
End: 2022-01-15

## 2022-03-09 DIAGNOSIS — R00.2 PALPITATIONS: ICD-10-CM

## 2022-03-09 DIAGNOSIS — F43.22 ADJUSTMENT DISORDER WITH ANXIETY: ICD-10-CM

## 2022-03-09 RX ORDER — PROPRANOLOL HYDROCHLORIDE 20 MG/1
20 TABLET ORAL 3 TIMES DAILY PRN
Qty: 30 TABLET | Refills: 0 | Status: SHIPPED | OUTPATIENT
Start: 2022-03-09 | End: 2023-06-05

## 2022-03-09 RX ORDER — ESCITALOPRAM OXALATE 10 MG/1
TABLET ORAL
Qty: 90 TABLET | Refills: 0 | Status: SHIPPED | OUTPATIENT
Start: 2022-03-09 | End: 2022-04-05

## 2022-03-09 NOTE — TELEPHONE ENCOUNTER
Routing refill request to provider for review/approval because:  PHQ 2/1/2021 2/26/2021 3/9/2022   PHQ-9 Total Score 5 2 4   Q9: Thoughts of better off dead/self-harm past 2 weeks Not at all Not at all Not at all     MERY-7 SCORE 2/1/2021 2/26/2021 3/9/2022   Total Score - - -   Total Score - - 6 (mild anxiety)   Total Score 17 6 6       Thank you.  Roberto Martin, RN

## 2022-04-05 ENCOUNTER — MYC REFILL (OUTPATIENT)
Dept: FAMILY MEDICINE | Facility: CLINIC | Age: 52
End: 2022-04-05
Payer: COMMERCIAL

## 2022-04-05 DIAGNOSIS — F43.22 ADJUSTMENT DISORDER WITH ANXIETY: ICD-10-CM

## 2022-04-06 RX ORDER — ESCITALOPRAM OXALATE 10 MG/1
10 TABLET ORAL DAILY
Qty: 90 TABLET | Refills: 0 | Status: SHIPPED | OUTPATIENT
Start: 2022-04-06 | End: 2022-07-26

## 2022-04-06 NOTE — TELEPHONE ENCOUNTER
Routing refill request to provider for review/approval because:  Patient needs to be seen because:  Overdue for annual exam    Waldo Tao RN

## 2022-06-08 ENCOUNTER — NURSE TRIAGE (OUTPATIENT)
Dept: NURSING | Facility: CLINIC | Age: 52
End: 2022-06-08
Payer: COMMERCIAL

## 2022-06-09 NOTE — TELEPHONE ENCOUNTER
Nurse Triage SBAR    Is this a 2nd Level Triage? NO    Situation: Patient calling with question about JIF peanut butter recall.  Consent: not needed    JIF peanut butter recalled  Hx: IBS, COVID-positive (6/5/22)  Ate half a peanut butter sandwich made with the recalled JIF peanut butter  Per FDA.gov, symptoms include: fever, diarrhea, nausea, vomiting, and abdominal pain.   Per the pt, she is currently not experiencing any of these symptoms as she only ate the sandwich about 30 minutes ago.   This writer informed pt that if she is not experiencing symptoms now that I am not able to triage her, verbalized understanding. This writer informed pt that if she does start to experience symptoms she can call the nurse triage line back, verbalized understanding. This writer went on further to state that the patient can call her clinic during business hours to get further guidance as well, verbalized understanding      Protocol Recommended Disposition:   Home Care    Recommendation: Advised patient to do home care. Home care reviewed.  . Reviewed concerning symptoms and when to call back.         Mckenzie Espinosa RN [facility] 6/8/2022 7:50 PM    Reason for Disposition    Health Information question, no triage required and triager able to answer question    Protocols used: INFORMATION ONLY CALL - NO TRIAGE-A-

## 2022-06-21 ENCOUNTER — TELEPHONE (OUTPATIENT)
Dept: FAMILY MEDICINE | Facility: CLINIC | Age: 52
End: 2022-06-21
Payer: COMMERCIAL

## 2022-06-21 DIAGNOSIS — Z12.31 VISIT FOR SCREENING MAMMOGRAM: Primary | ICD-10-CM

## 2022-06-21 NOTE — TELEPHONE ENCOUNTER
Patient Quality Outreach    Patient is due for the following:   Breast Cancer Screening - Mammogram  Cervical Cancer Screening - PAP Needed  Physical  - Due after 09/26/2020  Immunizations  -  Covid and Zoster    NEXT STEPS:   Patient was scheduled for an appointment.    Type of outreach:    Phone, spoke to patient/parent. scheduled physical and placed order for mammogram.      Questions for provider review:    None     Lien Figueroa  Chart routed to Lien Figueroa CMA.

## 2022-07-22 ENCOUNTER — VIRTUAL VISIT (OUTPATIENT)
Dept: FAMILY MEDICINE | Facility: CLINIC | Age: 52
End: 2022-07-22
Payer: COMMERCIAL

## 2022-07-22 DIAGNOSIS — H10.31 ACUTE BACTERIAL CONJUNCTIVITIS OF RIGHT EYE: Primary | ICD-10-CM

## 2022-07-22 PROCEDURE — 99213 OFFICE O/P EST LOW 20 MIN: CPT | Mod: GT | Performed by: FAMILY MEDICINE

## 2022-07-22 RX ORDER — LORATADINE 10 MG/1
TABLET ORAL DAILY
COMMUNITY
End: 2022-07-26

## 2022-07-22 RX ORDER — POLYMYXIN B SULFATE AND TRIMETHOPRIM 1; 10000 MG/ML; [USP'U]/ML
SOLUTION OPHTHALMIC
Qty: 10 ML | Refills: 0 | Status: SHIPPED | OUTPATIENT
Start: 2022-07-22 | End: 2022-07-26

## 2022-07-22 RX ORDER — FLUTICASONE PROPIONATE 50 MCG
1 SPRAY, SUSPENSION (ML) NASAL DAILY
COMMUNITY
End: 2022-07-26

## 2022-07-22 NOTE — ASSESSMENT & PLAN NOTE
Will treat with Polytrim eye drops.  Follow-up if not improved in the next 1-2 days.  Keep contacts out until infection is resolved.

## 2022-07-22 NOTE — PROGRESS NOTES
Daphnie is a 52 year old who is being evaluated via a billable video visit.      How would you like to obtain your AVS? MyChart  If the video visit is dropped, the invitation should be resent by: Text to cell phone: [phone]  Will anyone else be joining your video visit? No        Video-Visit Details    Video Start Time: 11:42 AM    Type of service:  Video Visit    Video End Time:12 pm    Originating Location (pt. Location): Home    Distant Location (provider location):  [facility]     Platform used for Video Visit: Be[employer]    Problem List Items Addressed This Visit     Acute bacterial conjunctivitis of right eye - Primary     Will treat with Polytrim eye drops.  Follow-up if not improved in the next 1-2 days.  Keep contacts out until infection is resolved.           Relevant Medications    trimethoprim-polymyxin b (POLYTRIM) 33118-4.1 UNIT/ML-% ophthalmic solution             Subjective   Daphnie is a 52 year old who presents for the following health issues   Chief Complaint   Patient presents with     Eye Problem     RT. Crusted yesterday AM, red, burning, itching.      HPI     Review of Systems     She has allergies. Woke up yesterday am and right eye was crusted / irritated.  She is allergic to her dogs.  She flushed her eyes with Eye Relief from [employer].  Right eye got worse and last night was red/ burning / itching/ weeping.  Woke up and right eye was crusted.  Used Raj A allergy drops with some improvement.  No insect bite.      She has 2 and 3 yo grandsons she watches on regular basis.  Has stopped wearing her contacts in last few days - wears daily disposables.        Objective           Vitals:  No vitals were obtained today due to virtual visit.    Physical Exam   GENERAL: healthy, alert and no distress  EYES: Eyes grossly normal to inspection and conjunctiva/corneas- conjunctival injection OD            Karlene Cruz MD

## 2022-07-26 ENCOUNTER — OFFICE VISIT (OUTPATIENT)
Dept: FAMILY MEDICINE | Facility: CLINIC | Age: 52
End: 2022-07-26
Payer: COMMERCIAL

## 2022-07-26 VITALS
DIASTOLIC BLOOD PRESSURE: 75 MMHG | HEIGHT: 66 IN | BODY MASS INDEX: 23.87 KG/M2 | HEART RATE: 80 BPM | TEMPERATURE: 97.9 F | WEIGHT: 148.5 LBS | SYSTOLIC BLOOD PRESSURE: 119 MMHG

## 2022-07-26 DIAGNOSIS — F43.22 ADJUSTMENT DISORDER WITH ANXIETY: ICD-10-CM

## 2022-07-26 DIAGNOSIS — Z12.31 ENCOUNTER FOR SCREENING MAMMOGRAM FOR BREAST CANCER: ICD-10-CM

## 2022-07-26 DIAGNOSIS — Z13.220 SCREENING FOR HYPERLIPIDEMIA: ICD-10-CM

## 2022-07-26 DIAGNOSIS — Z11.4 SCREENING FOR HIV (HUMAN IMMUNODEFICIENCY VIRUS): ICD-10-CM

## 2022-07-26 DIAGNOSIS — Z00.01 ENCOUNTER FOR GENERAL ADULT MEDICAL EXAMINATION WITH ABNORMAL FINDINGS: Primary | ICD-10-CM

## 2022-07-26 DIAGNOSIS — Z11.59 NEED FOR HEPATITIS C SCREENING TEST: ICD-10-CM

## 2022-07-26 DIAGNOSIS — Z12.4 CERVICAL CANCER SCREENING: ICD-10-CM

## 2022-07-26 DIAGNOSIS — Z13.1 SCREENING FOR DIABETES MELLITUS: ICD-10-CM

## 2022-07-26 PROBLEM — H10.31 ACUTE BACTERIAL CONJUNCTIVITIS OF RIGHT EYE: Status: RESOLVED | Noted: 2022-07-22 | Resolved: 2022-07-26

## 2022-07-26 PROCEDURE — G0145 SCR C/V CYTO,THINLAYER,RESCR: HCPCS | Performed by: FAMILY MEDICINE

## 2022-07-26 PROCEDURE — 87624 HPV HI-RISK TYP POOLED RSLT: CPT | Performed by: FAMILY MEDICINE

## 2022-07-26 PROCEDURE — 99396 PREV VISIT EST AGE 40-64: CPT | Performed by: FAMILY MEDICINE

## 2022-07-26 RX ORDER — ESCITALOPRAM OXALATE 10 MG/1
10 TABLET ORAL DAILY
Qty: 90 TABLET | Refills: 3 | Status: SHIPPED | OUTPATIENT
Start: 2022-07-26 | End: 2023-08-18

## 2022-07-26 ASSESSMENT — ENCOUNTER SYMPTOMS
BREAST MASS: 0
WEAKNESS: 0
ABDOMINAL PAIN: 0
CHILLS: 0
SHORTNESS OF BREATH: 0
EYE PAIN: 0
FREQUENCY: 0
PARESTHESIAS: 0
PALPITATIONS: 0
HEMATURIA: 0
HEADACHES: 1
DIARRHEA: 0
HEARTBURN: 0
DYSURIA: 0
FEVER: 0
COUGH: 0
ARTHRALGIAS: 0
HEMATOCHEZIA: 0
NAUSEA: 0
CONSTIPATION: 0
MYALGIAS: 0
NERVOUS/ANXIOUS: 0
DIZZINESS: 0
JOINT SWELLING: 0
SORE THROAT: 0

## 2022-07-26 ASSESSMENT — ANXIETY QUESTIONNAIRES
2. NOT BEING ABLE TO STOP OR CONTROL WORRYING: NOT AT ALL
1. FEELING NERVOUS, ANXIOUS, OR ON EDGE: SEVERAL DAYS
3. WORRYING TOO MUCH ABOUT DIFFERENT THINGS: SEVERAL DAYS
7. FEELING AFRAID AS IF SOMETHING AWFUL MIGHT HAPPEN: NOT AT ALL
IF YOU CHECKED OFF ANY PROBLEMS ON THIS QUESTIONNAIRE, HOW DIFFICULT HAVE THESE PROBLEMS MADE IT FOR YOU TO DO YOUR WORK, TAKE CARE OF THINGS AT HOME, OR GET ALONG WITH OTHER PEOPLE: SOMEWHAT DIFFICULT
GAD7 TOTAL SCORE: 3
6. BECOMING EASILY ANNOYED OR IRRITABLE: SEVERAL DAYS
5. BEING SO RESTLESS THAT IT IS HARD TO SIT STILL: NOT AT ALL
GAD7 TOTAL SCORE: 3

## 2022-07-26 ASSESSMENT — PATIENT HEALTH QUESTIONNAIRE - PHQ9
SUM OF ALL RESPONSES TO PHQ QUESTIONS 1-9: 2
5. POOR APPETITE OR OVEREATING: NOT AT ALL

## 2022-07-26 NOTE — PROGRESS NOTES
SUBJECTIVE:   CC: Daphnie Yung is an 52 year old woman who presents for preventive health visit.       Patient has been advised of split billing requirements and indicates understanding: Yes  Healthy Habits:     Getting at least 3 servings of Calcium per day:  Yes    Bi-annual eye exam:  Yes    Dental care twice a year:  Yes    Sleep apnea or symptoms of sleep apnea:  None    Diet:  Other    Frequency of exercise:  None    Taking medications regularly:  Yes    Medication side effects:  Other    PHQ-2 Total Score: 0    Additional concerns today:  Yes       -Dairy Free diet     -Black Tarry stools last night   Today has dark blood in stool   Patient has internal and external Hemorid's but this time blood was not on TP   [facility]   Hasn't scheduled it     -Just got over [place] [facility]-   Has dogs that she is allergic to.   Notices a lot of dog hair in her eyes.   Getting over pink eye on antibiotic drops   Right eye wants checked           -escitalopram  - wanting to stay on work well   Parentts  a year ago - 5 days apart   Sometimes taking benadryl for sleep   Watches grandchildren    Has tried melatonin- has bad dreams with it   Taking her lexapro. It helps keep her stable    Today's PHQ-2 Score: 0  PHQ-2 (  [employer]) 2022   Q1: Little interest or pleasure in doing things 0   Q2: Feeling down, depressed or hopeless 0   PHQ-2 Score 0   PHQ-2 Total Score (12-17 Years)- Positive if 3 or more points; Administer PHQ-A if positive -   Q1: Little interest or pleasure in doing things Not at all   Q2: Feeling down, depressed or hopeless Not at all   PHQ-2 Score 0       Abuse: Current or Past (Physical, Sexual or Emotional) - No  Do you feel safe in your environment? Yes    Have you ever done Advance Care Planning? (For example, a Health Directive, POLST, or a discussion with a medical provider or your loved ones about your wishes): No, advance care planning information given to patient  to review.  Patient declined advance care planning discussion at this time.    Social History     Tobacco Use     Smoking status: Never Smoker     Smokeless tobacco: Never Used   Substance Use Topics     Alcohol use: No     Comment: rarly on weekends     If you drink alcohol do you typically have >3 drinks per day or >7 drinks per week? No    Alcohol Use 7/26/2022   Prescreen: >3 drinks/day or >7 drinks/week? Not Applicable   Prescreen: >3 drinks/day or >7 drinks/week? -       Reviewed orders with patient.  Reviewed health maintenance and updated orders accordingly - Yes  Breast Cancer Screening:    Breast CA Risk Assessment (FHS-7) 7/26/2022   Do you have a family history of breast, colon, or ovarian cancer? No / Unknown     .  Pertinent mammograms are reviewed under the imaging tab.    History of abnormal Pap smear: due for pap      Reviewed and updated as needed this visit by clinical staff   Tobacco     Med Hx  Surg Hx  Fam Hx  Soc Hx          Reviewed and updated as needed this visit by Provider                       Review of Systems   Constitutional: Negative for chills and fever.   HENT: Negative for congestion, ear pain, hearing loss and sore throat.    Eyes: Negative for pain and visual disturbance.   Respiratory: Negative for cough and shortness of breath.    Cardiovascular: Negative for chest pain, palpitations and peripheral edema.   Gastrointestinal: Negative for abdominal pain, constipation, diarrhea, heartburn, hematochezia and nausea.   Breasts:  Negative for tenderness, breast mass and discharge.   Genitourinary: Negative for dysuria, frequency, genital sores, hematuria, pelvic pain, urgency, vaginal bleeding and vaginal discharge.   Musculoskeletal: Negative for arthralgias, joint swelling and myalgias.   Skin: Negative for rash.   Neurological: Positive for headaches. Negative for dizziness, weakness and paresthesias.   Psychiatric/Behavioral: Negative for mood changes. The patient is not  "nervous/anxious.      CONSTITUTIONAL: NEGATIVE for fever, chills, change in weight  INTEGUMENTARY/SKIN: NEGATIVE for worrisome rashes, moles or lesions  EYES: NEGATIVE for vision changes or irritation  No abns noted. Clear conjunctiva. No swelling or redness  ENT: NEGATIVE for ear, mouth and throat problems  RESP: NEGATIVE for significant cough or SOB  BREAST: NEGATIVE for masses, tenderness or discharge  CV: NEGATIVE for chest pain, palpitations or peripheral edema  GI: NEGATIVE for nausea, abdominal pain, heartburn, or change in bowel habits  : NEGATIVE for unusual urinary or vaginal symptoms. No vaginal bleeding.  MUSCULOSKELETAL: NEGATIVE for significant arthralgias or myalgia  NEURO: NEGATIVE for weakness, dizziness or paresthesias  PSYCHIATRIC: NEGATIVE for changes in mood or affect      OBJECTIVE:   /75   Pulse 80   Temp 97.9  F (36.6  C) (Tympanic)   Ht 1.664 m (5' 5.5\")   Wt 67.4 kg (148 lb 8 oz)   LMP 01/01/2020   BMI 24.34 kg/m    Physical Exam  GENERAL: healthy, alert and no distress  EYES: Eyes grossly normal to inspection, PERRL and conjunctivae and sclerae normal  HENT: ear canals and TM's normal, nose and mouth without ulcers or lesions  NECK: no adenopathy, no asymmetry, masses, or scars and thyroid normal to palpation  RESP: lungs clear to auscultation - no rales, rhonchi or wheezes  BREAST: normal without masses, tenderness or nipple discharge and no palpable axillary masses or adenopathy  CV: regular rate and rhythm, normal S1 S2, no S3 or S4, no murmur, click or rub, no peripheral edema and peripheral pulses strong  ABDOMEN: soft, nontender, no hepatosplenomegaly, no masses and bowel sounds normal   (female): normal female external genitalia, normal urethral meatus, vaginal mucosa pink, moist, well rugated, and normal cervix/adnexa/uterus without masses or discharge  MS: no gross musculoskeletal defects noted, no edema  SKIN: no suspicious lesions or rashes  NEURO: Normal " "strength and tone, mentation intact and speech normal  PSYCH: mentation appears normal, affect normal/bright    Diagnostic Test Results:  Labs reviewed in Epic    ASSESSMENT/PLAN:   (Z00.01) Encounter for general adult medical examination with abnormal findings  (primary encounter diagnosis)  Comment: We discussed self breast exams, exercise 30mins/day, and calcium with vitamin D at 1200mg/day, preferably from dietary sources.  Diet, Weight loss, and Exercise were discussed as well .       (Z11.4) Screening for HIV (human immunodeficiency virus)  Comment: discussed   Plan: HIV Antigen Antibody Combo            (Z11.59) Need for hepatitis C screening test  Comment: discussed   Plan: Hepatitis C Screen Reflex to HCV RNA Quant and         Genotype            (Z13.220) Screening for hyperlipidemia  Comment: discussed   Plan: Lipid panel reflex to direct LDL Non-fasting            (Z12.4) Cervical cancer screening  Comment: discussed   Plan: Pap Screen with HPV - recommended age 30 - 65         years            (F43.22) Adjustment disorder with anxiety  Comment: doing well. Stable.   Plan: escitalopram (LEXAPRO) 10 MG tablet            (Z12.31) Encounter for screening mammogram for breast cancer  Comment: discussed   Plan: MA Screen Bilateral w/Michael            (Z13.1) Screening for diabetes mellitus  Comment: discussed   Plan: Glucose            COUNSELING:  Reviewed preventive health counseling, as reflected in patient instructions       Regular exercise       Healthy diet/nutrition    Estimated body mass index is 23.58 kg/m  as calculated from the following:    Height as of 11/4/21: 1.67 m (5' 5.75\").    Weight as of 11/4/21: 65.8 kg (145 lb).    Weight management plan: Discussed healthy diet and exercise guidelines    She reports that she has never smoked. She has never used smokeless tobacco.      Counseling Resources:  ATP IV Guidelines  Pooled Cohorts Equation Calculator  Breast Cancer Risk Calculator  BRCA-Related " Cancer Risk Assessment: FHS-7 Tool  FRAX Risk Assessment  ICSI Preventive Guidelines  Dietary Guidelines for Americans, 2010  [employer] MyPlate  ASA Prophylaxis  Lung CA Screening    Leigh Ann Worthy MD  [facility]

## 2022-07-28 LAB
BKR LAB AP GYN ADEQUACY: NORMAL
BKR LAB AP GYN INTERPRETATION: NORMAL
BKR LAB AP HPV REFLEX: NORMAL
BKR LAB AP PREVIOUS ABNORMAL: NORMAL
PATH REPORT.COMMENTS IMP SPEC: NORMAL
PATH REPORT.COMMENTS IMP SPEC: NORMAL
PATH REPORT.RELEVANT HX SPEC: NORMAL

## 2022-07-30 ENCOUNTER — NURSE TRIAGE (OUTPATIENT)
Dept: NURSING | Facility: CLINIC | Age: 52
End: 2022-07-30

## 2022-07-30 ENCOUNTER — OFFICE VISIT (OUTPATIENT)
Dept: URGENT CARE | Facility: URGENT CARE | Age: 52
End: 2022-07-30
Payer: COMMERCIAL

## 2022-07-30 VITALS
BODY MASS INDEX: 24.81 KG/M2 | WEIGHT: 151.4 LBS | DIASTOLIC BLOOD PRESSURE: 81 MMHG | OXYGEN SATURATION: 100 % | SYSTOLIC BLOOD PRESSURE: 125 MMHG | HEART RATE: 85 BPM | TEMPERATURE: 98.3 F

## 2022-07-30 DIAGNOSIS — H10.9 CONJUNCTIVITIS OF RIGHT EYE, UNSPECIFIED CONJUNCTIVITIS TYPE: Primary | ICD-10-CM

## 2022-07-30 DIAGNOSIS — H10.11 ALLERGIC CONJUNCTIVITIS, RIGHT: ICD-10-CM

## 2022-07-30 PROCEDURE — 99213 OFFICE O/P EST LOW 20 MIN: CPT | Performed by: FAMILY MEDICINE

## 2022-07-30 RX ORDER — OLOPATADINE HYDROCHLORIDE 1 MG/ML
1 SOLUTION/ DROPS OPHTHALMIC 2 TIMES DAILY
Qty: 5 ML | Refills: 0 | Status: SHIPPED | OUTPATIENT
Start: 2022-07-30 | End: 2022-12-07

## 2022-07-30 NOTE — PROGRESS NOTES
Chief complaint: right eye    2 weeks ago right eye was red and itchy - patient has allergies and has a history of allergies and the dog hairs always going in theeye   Was very red and irritated - patient did a virtual visit. Did have some matting   Did a virtual visit - 8 days ago started polytrim     Couple of times in the past the right eye has always been a bit more problematic -but then would get better    Symptoms didn't improve   Last night was perfectly even on waking up  But then came back this got worse again today this afternoon  denies photophobia  denies feeling of foreign body  Patient is a contact lens wearer but has not worn them for 2 weeks since symptoms started   denies eye pain  denies blurring of vision  denies URI symptoms  Tried supportive treatment no relief  Worsening symptoms hence came in    Problem list, Medication list, Allergies, and Medical/Social/Surgical histories reviewed in EPIC and updated as appropriate.    ROS:  General: negative for fever  EYE: as above  No fevers or chills chest pain or shortness of breath     OBJECTIVE:  /81 (BP Location: Left arm, Patient Position: Sitting, Cuff Size: Adult Regular)   Pulse 85   Temp 98.3  F (36.8  C) (Axillary)   Wt 68.7 kg (151 lb 6.4 oz)   LMP 01/01/2020   SpO2 100%   BMI 24.81 kg/m     General : Awake Alert not in any acute cardiorespiratory distress  Head:       Normocephalic Atraumatic  Eyes:    Pupils equally reactive to light and accomodation. Sclera not icteric. Extra occular muscles intact full and equal. No hyphema, no hypopyon, no ciliary flush. No eyelid swelling or periorbital cellulitis. Mild erythema of  right  conjunctiva. Some clear swelling of the bulbar conjunctiva on the right   Neurologic: No cranial nerve deficits.   Psych: Appropriate mood and affect. Pleasant  Skin: patient undressed to level of his/her comfort. No visible concerning lesions.      ASSESSMENT:    ICD-10-CM    1. Conjunctivitis of right  eye, unspecified conjunctivitis type  H10.9 olopatadine (PATANOL) 0.1 % ophthalmic solution   2. Allergic conjunctivitis, right  H10.11 olopatadine (PATANOL) 0.1 % ophthalmic solution           PLAN:   Trial patanol   Advised about symptoms which might herald more serious problems.    adverse reactions of medication discussed  advised to come back in right away if with any worsening symptoms or if with no relief   aware to come in right away especially if with any blurring of vision, photophobia, pain, feeling of foreign body.   despite treatment plan  patient voiced understanding and had no further questions at this time.  Recommend eye clinic follow up - she has an appointment in 2 days. Do not use contact lenses until re-evaluated by eye doctor and cleared and instructed by eye doctor.       Irlanda Woodson MD

## 2022-07-30 NOTE — TELEPHONE ENCOUNTER
Pt called stating she has been having eye problem for weeks and was recently treated pinky eye with antibiotic eye drops. Pt stated today all morning it was irrated and she has bad allergies. Pt states 10 minutes ago , she noticed her right eyeball is swollen( moderate).She stated the eye is irritated, and painful. Pt stated she used the eye drops foe a week. Pt denied fever.      Per protocal pt was advised to be seen within 4 hours and pt stated okay. Pt stated she took clartin and Flonase and wondering if she can take beadryl?    RN paged on call provider, received a return call from Dr Lokesh soto, provider was informed an she state dit is okay pt to take 25 to 50 of benadryl and should go in, eye ball shouldn't swell.      RN called pt twice and message was left to call back. If pt calls back please rely provider advice above.      Chuy Odom RN  [facility]         COVID 19 Nurse Triage Plan/Patient Instructions    Please be aware that novel coronavirus (COVID-19) may be circulating in the community. If you develop symptoms such as fever, cough, or SOB or if you have concerns about the presence of another infection including coronavirus (COVID-19), please contact your health care provider or visit [email or web address].     Disposition/Instructions    In-Person Visit with provider recommended. Reference Visit Selection Guide.    Thank you for taking steps to prevent the spread of this virus.  o Limit your contact with others.  o Wear a simple mask to cover your cough.  o Wash your hands well and often.    Resources    [facility]: About COVID-19: [email or web address]    CDC: What to Do If You're Sick: www.cdc.gov/coronavirus/2019-ncov/about/steps-when-sick.html    CDC: Ending Home Isolation: www.cdc.gov/coronavirus/2019-ncov/hcp/disposition-in-home-patients.html     CDC: Caring for Someone: www.cdc.gov/coronavirus/2019-ncov/if-you-are-sick/care-for-someone.html      [facility]: Interim Guidance for Hospital Discharge to Home: www.health.[facility].mn.us/diseases/coronavirus/hcp/hospdischarge.pdf    [facility] clinical trials (COVID-19 research studies): [email or web address]     Below are the COVID-19 hotlines at the [place] [facility] ([facility]). Interpreters are available.   o For health questions: Call 666-669-0641 or 1-304.768.8288 (7 a.m. to 7 p.m.)  o For questions about schools and childcare: Call 684-948-4566 or 1-124.549.7766 (7 a.m. to 7 p.m.)       Reason for Disposition    [1] SEVERE eyelid swelling on one side AND [2] red and painful (or tender to touch)    Additional Information    Negative: Unresponsive, passed out or very weak    Negative: Difficulty breathing or wheezing    Negative: [1] Difficulty swallowing or slurred speech AND [2] sudden onset    Negative: Sounds like a life-threatening emergency to the triager    Negative: Recent injury to the eye    Negative: Entire face is swollen    Negative: Sacs of clear fluid (blisters) on whites of eyes (allergic cysts)    Negative: Contact with pollen, other allergic substance or eyedrops    Negative: [1] Bee sting AND [2] within last 24 hours    Negative: Insect bite suspected    Negative: Sty suspected (small, painful red lump present on lid margin    Negative: Yellow or green discharge (pus) in the eye    Negative: Redness of white area (sclera) of eye(s)    Negative: [1] SEVERE eyelid swelling (i.e., shut or almost) AND [2] fever    Negative: [1] Eyelid (outer) is very red AND [2] fever    Negative: Patient sounds very sick or weak to the triager    Negative: [1] Pregnant > 20 weeks AND [2] sudden weight gain (i.e., more than 3 lbs or 1.4 kg in one week)    Negative: [1] SEVERE eyelid swelling (i.e., shut or almost) AND [2] involves both eyes      (Exception: itchy eyes, which  are probably an allergic reaction)    Negative: [1] SEVERE eyelid swelling (i.e., shut or almost) AND  [2] Taking an ACE Inhibitor medication (e.g., benazepril/LOTENSIN, captopril/CAPOTEN, enalapril/VASOTEC, lisinopril/ZESTRIL)    Protocols used: EYE - SWELLING-A-AH

## 2022-07-31 NOTE — TELEPHONE ENCOUNTER
Pt calling about eye drops prescribed today at , Rx was sent to pharmacy that is closed, wants to see if can have Rx transferred to another [facility] pharmacy nearby.    Advised pt to call [facility] to have Rx transferred. Pt will call [facility].      Jacob Hood RN, BSN  7/30/2022 at 7:28 PM  [facility]    Additional Information    General information question, no triage required and triager able to answer question    Protocols used: INFORMATION ONLY CALL-A-

## 2022-08-02 ENCOUNTER — OFFICE VISIT (OUTPATIENT)
Dept: OPTOMETRY | Facility: CLINIC | Age: 52
End: 2022-08-02
Payer: COMMERCIAL

## 2022-08-02 DIAGNOSIS — H04.123 DRY EYES, BILATERAL: ICD-10-CM

## 2022-08-02 DIAGNOSIS — H10.13 ALLERGIC CONJUNCTIVITIS, BILATERAL: Primary | ICD-10-CM

## 2022-08-02 LAB
HUMAN PAPILLOMA VIRUS 16 DNA: NEGATIVE
HUMAN PAPILLOMA VIRUS 18 DNA: NEGATIVE
HUMAN PAPILLOMA VIRUS FINAL DIAGNOSIS: NORMAL
HUMAN PAPILLOMA VIRUS OTHER HR: NEGATIVE

## 2022-08-02 PROCEDURE — 99203 OFFICE O/P NEW LOW 30 MIN: CPT | Performed by: OPTOMETRIST

## 2022-08-02 ASSESSMENT — SLIT LAMP EXAM - LIDS: COMMENTS: 1+ MEIBOMIAN GLAND DYSFUNCTION

## 2022-08-02 ASSESSMENT — VISUAL ACUITY
CORRECTION_TYPE: GLASSES
METHOD: SNELLEN - LINEAR
OD_CC: 20/20
OS_CC: 20/20

## 2022-08-02 NOTE — LETTER
8/2/2022         RE: Daphnie Yung  [address]  [facility] [address]        Dear Colleague,    Thank you for referring your patient, Daphnie Yung, to the [facility]. Please see a copy of my visit note below.        Chief Complaint(s) and History of Present Illness(es)     Eye Problem Right Eye     Laterality: right eye    Onset: gradual    Onset: 2 months ago    Associated symptoms: itching and foreign body sensation    Treatments tried: eye drops    Response to treatment: significant improvement    Comments: Patient said that for the last couple months she has been having problems with the right eye. She said that she has had allergy symptoms in both eyes, but this time it's just the right eye. She has done a virtual visit in the past and was given a drop for pink eye.  She also went to the  this past Saturday and was given drops. She also took Benadryl at that time. She also mentioned that she is allergic to dogs, and she has several.   Patient wears contacts, Dailies. Hasn't worn for at least 2 week                    [employer] Optometric Assistant      Wears 1 day disposable lens only when comfortable         Medical, surgical and family histories reviewed and updated 8/2/2022.         Has seasonal and allergies to dogs (has 2 dogs) , takes Zyrtec and Flonase when needed    Spent weekend at daughters and eyes felt better    OBJECTIVE: See Ophthalmology exam    ASSESSMENT:    ICD-10-CM    1. Allergic conjunctivitis, bilateral  H10.13    2. Dry eyes, bilateral  H04.123       PLAN:  Wait to wear contact lenses until eyes feel good  Patient Instructions   Use over the counter artificial tears 3 times a day ( Thera Tears, Refresh Relieva or Systane Complete )   Continue using prescription allergy drop      Use Zaditor or Alaway allergy drop twice a day as needed for itchy eyes     Oksana Matthew O.D.  [facility]  [address] [place], [place]  [id number] [phone]                 Again, thank you for allowing me to participate in the care of your patient.        Sincerely,        Oksana Matthew, OD

## 2022-08-02 NOTE — PATIENT INSTRUCTIONS
Use over the counter artificial tears 3 times a day ( Thera Tears, Refresh Relieva or Systane Complete )   Continue using prescription allergy drop      Use Zaditor or Alaway allergy drop twice a day as needed for itchy eyes     Oksana Matthew O.D.  [facility]  [address] [place] [address] [phone]

## 2022-08-02 NOTE — PROGRESS NOTES
Chief Complaint(s) and History of Present Illness(es)     Eye Problem Right Eye     Laterality: right eye    Onset: gradual    Onset: 2 months ago    Associated symptoms: itching and foreign body sensation    Treatments tried: eye drops    Response to treatment: significant improvement    Comments: Patient said that for the last couple months she has been having problems with the right eye. She said that she has had allergy symptoms in both eyes, but this time it's just the right eye. She has done a virtual visit in the past and was given a drop for pink eye.  She also went to the  this past Saturday and was given drops. She also took Benadryl at that time. She also mentioned that she is allergic to dogs, and she has several.   Patient wears contacts, Dailies. Hasn't worn for at least 2 week                    [employer] Optometric Assistant      Wears 1 day disposable lens only when comfortable         Medical, surgical and family histories reviewed and updated 8/2/2022.         Has seasonal and allergies to dogs (has 2 dogs) , takes Zyrtec and Flonase when needed    Spent weekend at [facility] and eyes felt better    OBJECTIVE: See Ophthalmology exam    ASSESSMENT:    ICD-10-CM    1. Allergic conjunctivitis, bilateral  H10.13    2. Dry eyes, bilateral  H04.123       PLAN:  Wait to wear contact lenses until eyes feel good  Patient Instructions   Use over the counter artificial tears 3 times a day ( Thera Tears, Refresh Relieva or Systane Complete )   Continue using prescription allergy drop      Use Zaditor or Alaway allergy drop twice a day as needed for itchy eyes     Oksana Matthew O.D.  [facility]  [address] [place], [place] [id number] [phone]

## 2022-08-27 ENCOUNTER — HEALTH MAINTENANCE LETTER (OUTPATIENT)
Age: 52
End: 2022-08-27

## 2022-12-07 ENCOUNTER — OFFICE VISIT (OUTPATIENT)
Dept: FAMILY MEDICINE | Facility: CLINIC | Age: 52
End: 2022-12-07
Payer: COMMERCIAL

## 2022-12-07 VITALS
HEIGHT: 66 IN | RESPIRATION RATE: 16 BRPM | TEMPERATURE: 97.8 F | DIASTOLIC BLOOD PRESSURE: 68 MMHG | HEART RATE: 85 BPM | OXYGEN SATURATION: 96 % | SYSTOLIC BLOOD PRESSURE: 100 MMHG | BODY MASS INDEX: 24.77 KG/M2 | WEIGHT: 154.13 LBS

## 2022-12-07 DIAGNOSIS — Z00.00 HEALTHCARE MAINTENANCE: ICD-10-CM

## 2022-12-07 DIAGNOSIS — R07.9 CHEST PAIN, UNSPECIFIED TYPE: Primary | ICD-10-CM

## 2022-12-07 PROBLEM — R19.5 CHANGE IN STOOL CALIBER: Status: ACTIVE | Noted: 2022-12-07

## 2022-12-07 PROBLEM — K62.5 RECTAL HEMORRHAGE: Status: ACTIVE | Noted: 2022-12-07

## 2022-12-07 PROBLEM — R19.5 ABNORMAL FECES: Status: ACTIVE | Noted: 2021-12-14

## 2022-12-07 PROBLEM — K62.5 HEMORRHAGE OF RECTUM AND ANUS: Status: ACTIVE | Noted: 2021-12-14

## 2022-12-07 LAB
ALBUMIN SERPL BCG-MCNC: 4.5 G/DL (ref 3.5–5.2)
ALP SERPL-CCNC: 149 U/L (ref 35–104)
ALT SERPL W P-5'-P-CCNC: 15 U/L (ref 10–35)
ANION GAP SERPL CALCULATED.3IONS-SCNC: 8 MMOL/L (ref 7–15)
AST SERPL W P-5'-P-CCNC: 18 U/L (ref 10–35)
BILIRUB SERPL-MCNC: 0.3 MG/DL
BUN SERPL-MCNC: 14.9 MG/DL (ref 6–20)
CALCIUM SERPL-MCNC: 9.7 MG/DL (ref 8.6–10)
CHLORIDE SERPL-SCNC: 104 MMOL/L (ref 98–107)
CHOLEST SERPL-MCNC: 260 MG/DL
CREAT SERPL-MCNC: 0.89 MG/DL (ref 0.51–0.95)
DEPRECATED HCO3 PLAS-SCNC: 29 MMOL/L (ref 22–29)
ERYTHROCYTE [DISTWIDTH] IN BLOOD BY AUTOMATED COUNT: 13 % (ref 10–15)
GFR SERPL CREATININE-BSD FRML MDRD: 77 ML/MIN/1.73M2
GLUCOSE SERPL-MCNC: 91 MG/DL (ref 70–99)
HCT VFR BLD AUTO: 41.6 % (ref 35–47)
HDLC SERPL-MCNC: 48 MG/DL
HGB BLD-MCNC: 13.5 G/DL (ref 11.7–15.7)
LDLC SERPL CALC-MCNC: 154 MG/DL
MCH RBC QN AUTO: 28.5 PG (ref 26.5–33)
MCHC RBC AUTO-ENTMCNC: 32.5 G/DL (ref 31.5–36.5)
MCV RBC AUTO: 88 FL (ref 78–100)
NONHDLC SERPL-MCNC: 212 MG/DL
PLATELET # BLD AUTO: 251 10E3/UL (ref 150–450)
POTASSIUM SERPL-SCNC: 4.2 MMOL/L (ref 3.4–5.3)
PROT SERPL-MCNC: 7.3 G/DL (ref 6.4–8.3)
RBC # BLD AUTO: 4.73 10E6/UL (ref 3.8–5.2)
SODIUM SERPL-SCNC: 141 MMOL/L (ref 136–145)
TRIGL SERPL-MCNC: 292 MG/DL
TSH SERPL DL<=0.005 MIU/L-ACNC: 1.77 UIU/ML (ref 0.3–4.2)
WBC # BLD AUTO: 6.1 10E3/UL (ref 4–11)

## 2022-12-07 PROCEDURE — 99214 OFFICE O/P EST MOD 30 MIN: CPT | Performed by: FAMILY MEDICINE

## 2022-12-07 PROCEDURE — 84443 ASSAY THYROID STIM HORMONE: CPT | Performed by: FAMILY MEDICINE

## 2022-12-07 PROCEDURE — 85027 COMPLETE CBC AUTOMATED: CPT | Performed by: FAMILY MEDICINE

## 2022-12-07 PROCEDURE — 80053 COMPREHEN METABOLIC PANEL: CPT | Performed by: FAMILY MEDICINE

## 2022-12-07 PROCEDURE — 36415 COLL VENOUS BLD VENIPUNCTURE: CPT | Performed by: FAMILY MEDICINE

## 2022-12-07 PROCEDURE — 80061 LIPID PANEL: CPT | Performed by: FAMILY MEDICINE

## 2022-12-07 PROCEDURE — 93000 ELECTROCARDIOGRAM COMPLETE: CPT | Performed by: FAMILY MEDICINE

## 2022-12-07 ASSESSMENT — ENCOUNTER SYMPTOMS
ABDOMINAL PAIN: 0
CHILLS: 0
SORE THROAT: 0
HEADACHES: 0
JOINT SWELLING: 0
WEAKNESS: 0
BREAST MASS: 0
PARESTHESIAS: 0
DIZZINESS: 0
NAUSEA: 0
SHORTNESS OF BREATH: 0
MYALGIAS: 1
ARTHRALGIAS: 0
PALPITATIONS: 0
CONSTIPATION: 0
NERVOUS/ANXIOUS: 1
HEMATOCHEZIA: 1
EYE PAIN: 0
COUGH: 0
HEARTBURN: 0
FEVER: 0
FREQUENCY: 0
HEMATURIA: 0
DYSURIA: 0

## 2022-12-07 NOTE — PROGRESS NOTES
Answers for HPI/ROS submitted by the patient on 12/7/2022  Frequency of exercise:: None  Getting at least 3 servings of Calcium per day:: NO  Diet:: Other  Taking medications regularly:: Yes  Medication side effects:: None  Bi-annual eye exam:: NO  Dental care twice a year:: Yes  Sleep apnea or symptoms of sleep apnea:: None  abdominal pain: No  Blood in stool: Yes  Blood in urine: No  chest pain: Yes  chills: No  congestion: No  constipation: No  cough: No  dizziness: No  ear pain: No  eye pain: No  nervous/anxious: Yes  fever: No  frequency: No  genital sores: No  headaches: No  hearing loss: No  heartburn: No  arthralgias: No  joint swelling: No  peripheral edema: No  mood changes: No  myalgias: Yes  nausea: No  dysuria: No  palpitations: No  Skin sensation changes: No  sore throat: No  urgency: No  rash: No  shortness of breath: No  visual disturbance: No  weakness: No  pelvic pain: No  vaginal bleeding: No  vaginal discharge: No  tenderness: No  breast mass: No  breast discharge: No  Additional concerns today:: No    Assessment/Plan:    Daphnie Yung is a 52 year old female presenting for:    Chest pain, unspecified type  Given patient's history this seems less likely cardiac in nature.  EKG was done today which showed some very mildly negative T waves in V1 through V5 however these were seen on EKG back about 5 years ago as well.  When comparison was made EKGs were unchanged.    Laboratory testing as below.  I suspect that her pain is mostly gastrointestinal.  Omeprazole started.  She will discuss with GI tomorrow.  If GI work-up is negative could consider further cardiac testing but I do not think that it is necessary at this time.    - EKG 12-lead complete w/read - Clinics  - Comprehensive metabolic panel (BMP + Alb, Alk Phos, ALT, AST, Total. Bili, TP)  - CBC with platelets  - TSH with free T4 reflex  - omeprazole (PRILOSEC) 20 MG DR capsule  Dispense: 30 capsule; Refill: 0  - Comprehensive metabolic panel  "(BMP + Alb, Alk Phos, ALT, AST, Total. Bili, TP)  - CBC with platelets  - TSH with free T4 reflex    Healthcare maintenance  - Lipid panel reflex to direct LDL Fasting  - Lipid panel reflex to direct LDL Fasting      Medications Discontinued During This Encounter   Medication Reason     Cetirizine HCl (ZYRTEC PO)      Fluticasone Propionate (FLONASE ALLERGY RELIEF NA)      olopatadine (PATANOL) 0.1 % ophthalmic solution            Chief Complaint:  Gastrointestinal Problem        Subjective:   Daphnie Yung is a pleasant 52-year-old female who presents to the clinic today with concerns over indigestion and chest pain.    Patient states that she has a history of this for the last year or so however it is worsened over the last 6 months.  She notes that she has a tight feeling in the epigastric area radiating around her body both directions.  No specific left-sided chest pain.  No associated shortness of breath.  She notes that she will often \"loosen my bra\" to help alleviate the pain.  She notes that mostly during the day.  She does not do much dedicated physical activity but does watch her 3-year-old grandchild and notes that she runs after infrequently and does not notice the pain at that point.    She notes that she feels a fullness that will sometimes be transiently alleviated when she belches.    She has a follow-up with [facility] tomorrow.  They have previously recommended a colonoscopy and she is wondering about an endoscopy as well.    She has had an increase in stress over the last few years and worry.  She is not having any panic attacks.  The pain is not related to specifically stressful situations.      12 point review of systems completed and negative except for what has been described above    History   Smoking Status     Never   Smokeless Tobacco     Never         Current Outpatient Medications:      escitalopram (LEXAPRO) 10 MG tablet, Take 1 tablet (10 mg) by mouth daily, Disp: 90 tablet, Rfl: 3    " " omeprazole (PRILOSEC) 20 MG DR capsule, Take 1 capsule (20 mg) by mouth daily, Disp: 30 capsule, Rfl: 0     propranolol (INDERAL) 20 MG tablet, TAKE 1 TABLET (20 MG) BY MOUTH 3 TIMES DAILY AS NEEDED (PANIC ATTACKS, PALPITATIONS), Disp: 30 tablet, Rfl: 0     rizatriptan (MAXALT) 10 MG tablet, Reported on 5/11/2017, Disp: , Rfl:       Objective:  Vitals:    12/07/22 0928   BP: 100/68   Pulse: 85   Resp: 16   Temp: 97.8  F (36.6  C)   TempSrc: Tympanic   SpO2: 96%   Weight: 69.9 kg (154 lb 2 oz)   Height: 1.664 m (5' 5.5\")       Body mass index is 25.26 kg/m .    Vital signs reviewed and stable  General: No acute distress  Psych: Appropriate affect  HEENT: moist mucous membranes, pupils equal, round, reactive to light and accomodation, tympanic membranes are pearly grey bilaterally  Lymph: no cervical or supraclavicular lymphadenopathy  Cardiovascular: regular rate and rhythm with no murmur  Pulmonary: clear to auscultation bilaterally with no wheeze  Abdomen: soft, non tender, non distended with normo-active bowel sounds  Extremities: warm and well perfused with no edema  Skin: warm and dry with no rash         This note has been dictated and transcribed using voice recognition software.   Any errors in transcription are unintentional and inherent to the software.  "

## 2022-12-08 ENCOUNTER — TELEPHONE (OUTPATIENT)
Dept: FAMILY MEDICINE | Facility: CLINIC | Age: 52
End: 2022-12-08

## 2022-12-08 ENCOUNTER — TRANSFERRED RECORDS (OUTPATIENT)
Dept: HEALTH INFORMATION MANAGEMENT | Facility: CLINIC | Age: 52
End: 2022-12-08

## 2022-12-08 ENCOUNTER — MEDICAL CORRESPONDENCE (OUTPATIENT)
Dept: HEALTH INFORMATION MANAGEMENT | Facility: CLINIC | Age: 52
End: 2022-12-08

## 2022-12-08 NOTE — TELEPHONE ENCOUNTER
Please reassure this patient that I do not think that there is reason for concern regarding the mild elevation - particularly with normal liver function.  She can mention this to [facility] today as well.    Dr Romero

## 2022-12-08 NOTE — TELEPHONE ENCOUNTER
Patient called stating concern with her Alkaline Phosphatase 149.  Patient states she has an appointment with [facility] today.  Please advise.  Yvette Nolasco RN

## 2022-12-08 NOTE — TELEPHONE ENCOUNTER
Call placed to patient.  Voicemail message identified patient.  Relayed message per Dr Romero with call back number if additional questions.  Yvette Nolasco RN

## 2022-12-13 ENCOUNTER — ANCILLARY PROCEDURE (OUTPATIENT)
Dept: ULTRASOUND IMAGING | Facility: CLINIC | Age: 52
End: 2022-12-13
Attending: PHYSICIAN ASSISTANT
Payer: COMMERCIAL

## 2022-12-13 ENCOUNTER — TRANSFERRED RECORDS (OUTPATIENT)
Dept: HEALTH INFORMATION MANAGEMENT | Facility: CLINIC | Age: 52
End: 2022-12-13

## 2022-12-13 DIAGNOSIS — K62.5 RECTAL BLEEDING: ICD-10-CM

## 2022-12-13 DIAGNOSIS — R19.8 IRREGULAR BOWEL HABITS: ICD-10-CM

## 2022-12-13 DIAGNOSIS — R10.11 RUQ ABDOMINAL PAIN: ICD-10-CM

## 2022-12-13 DIAGNOSIS — R14.0 GASSINESS: ICD-10-CM

## 2022-12-13 PROCEDURE — 76700 US EXAM ABDOM COMPLETE: CPT | Mod: TC | Performed by: RADIOLOGY

## 2022-12-15 ENCOUNTER — TRANSFERRED RECORDS (OUTPATIENT)
Dept: HEALTH INFORMATION MANAGEMENT | Facility: CLINIC | Age: 52
End: 2022-12-15

## 2022-12-20 ENCOUNTER — VIRTUAL VISIT (OUTPATIENT)
Dept: FAMILY MEDICINE | Facility: CLINIC | Age: 52
End: 2022-12-20
Payer: COMMERCIAL

## 2022-12-20 DIAGNOSIS — N28.1 KIDNEY CYSTS: Primary | ICD-10-CM

## 2022-12-20 DIAGNOSIS — E78.5 HYPERLIPIDEMIA LDL GOAL <130: ICD-10-CM

## 2022-12-20 PROBLEM — R19.8 DIGESTIVE SYMPTOM: Status: ACTIVE | Noted: 2022-12-08

## 2022-12-20 PROBLEM — R10.11 RIGHT UPPER QUADRANT PAIN: Status: ACTIVE | Noted: 2022-12-20

## 2022-12-20 PROBLEM — R19.8 IRREGULAR BOWEL HABITS: Status: ACTIVE | Noted: 2022-12-20

## 2022-12-20 PROBLEM — R14.0 ABDOMINAL BLOATING: Status: ACTIVE | Noted: 2022-12-20

## 2022-12-20 PROBLEM — R14.0 ABDOMINAL DISTENSION, GASEOUS: Status: ACTIVE | Noted: 2022-12-08

## 2022-12-20 PROCEDURE — 99213 OFFICE O/P EST LOW 20 MIN: CPT | Mod: TEL | Performed by: FAMILY MEDICINE

## 2022-12-20 RX ORDER — ONDANSETRON 4 MG/1
TABLET, FILM COATED ORAL
COMMUNITY
Start: 2022-12-08 | End: 2023-10-16

## 2022-12-20 NOTE — PROGRESS NOTES
Daphnie is a 52 year old who is being evaluated via a billable telephone visit.      What phone number would you like to be contacted at? [phone]  How would you like to obtain your AVS? Mannie    Distant Location (provider location):  On-site    --------------------------------    Assessment/Plan:    Daphnie Yung is a 52 year old female who is being evaluated remotely for:    Kidney cysts  CT urogram was ordered  - CT Urogram wo & w Contrast    Hyperlipidemia LDL goal <130  We discussed her cholesterol length today.  No recommendation at this point for medication but lifestyle modifications recommended.  Should recheck next year.    Discussed gluten intolerance.  Reviewed her lab testing from 2013.  Discussed that if she is getting biopsies they would be most accurate if she has been eating gluten prior to biopsies.  If she plans on cutting out gluten indefinitely because she feels better she can let the GI physician know that as well.        There are no discontinued medications.        Chief Complaint:  Follow Up        Subjective:   Daphnie Yung is a 52 year old female who is being evaluated via telephone visit today for several questions/concerns.    Patient has been having GI issues.  She states that she recently saw the gastroenterologist.  They asked her if she had ever been tested for celiac and she stated that she had not.  Reviewing her chart it looks like she was tested in 2013 and it was negative via blood testing.  She states that she has an upcoming colonoscopy and endoscopy but she has not eating gluten and is feeling a bit better.  She was told that she needs to eat gluten before her test or else they might not be accurate.  She wants my opinion about that.    Follow-up kidneys: Patient had a ultrasound of her gallbladder ordered by GI and it was found that she had potential cyst on her bilateral kidneys and a CT urogram was recommended for follow-up.    She would also like to discuss her  cholesterol levels.  They were elevated at her most recent check.  She had a 10-year cardiovascular risk of less than 2% but would like to discuss other further.      12 point review of systems completed and negative except for what has been described above    History   Smoking Status     Never   Smokeless Tobacco     Never         Current Outpatient Medications:      escitalopram (LEXAPRO) 10 MG tablet, Take 1 tablet (10 mg) by mouth daily, Disp: 90 tablet, Rfl: 3     omeprazole (PRILOSEC) 20 MG DR capsule, Take 1 capsule (20 mg) by mouth daily, Disp: 30 capsule, Rfl: 0     propranolol (INDERAL) 20 MG tablet, TAKE 1 TABLET (20 MG) BY MOUTH 3 TIMES DAILY AS NEEDED (PANIC ATTACKS, PALPITATIONS), Disp: 30 tablet, Rfl: 0     rizatriptan (MAXALT) 10 MG tablet, Reported on 5/11/2017, Disp: , Rfl:      ondansetron (ZOFRAN) 4 MG tablet, , Disp: , Rfl:         Objective:  No vitals were done due to the remote nature of this visit    No flowsheet data found.              General: No acute distress, sounds well  Psych: Appropriate affect, pleasant  Pulmonary: Breathing comfortably, speaking in complete sentences     This note has been dictated and transcribed using voice recognition software.   Any errors in transcription are unintentional and inherent to the software.        Phone call duration: 18 minutes

## 2022-12-21 ENCOUNTER — HOSPITAL ENCOUNTER (OUTPATIENT)
Dept: CT IMAGING | Facility: HOSPITAL | Age: 52
Discharge: HOME | End: 2022-12-21
Attending: FAMILY MEDICINE | Admitting: FAMILY MEDICINE
Payer: COMMERCIAL

## 2022-12-21 DIAGNOSIS — N28.1 KIDNEY CYSTS: ICD-10-CM

## 2022-12-21 PROCEDURE — 250N000011 HC RX IP 250 OP 636: Performed by: FAMILY MEDICINE

## 2022-12-21 PROCEDURE — 74178 CT ABD&PLV WO CNTR FLWD CNTR: CPT

## 2022-12-21 RX ORDER — IOPAMIDOL 755 MG/ML
100 INJECTION, SOLUTION INTRAVASCULAR ONCE
Status: COMPLETED | OUTPATIENT
Start: 2022-12-21 | End: 2022-12-21

## 2022-12-21 RX ADMIN — IOPAMIDOL 100 ML: 755 INJECTION, SOLUTION INTRAVENOUS at 08:46

## 2022-12-26 ENCOUNTER — TELEPHONE (OUTPATIENT)
Dept: SURGERY | Facility: CLINIC | Age: 52
End: 2022-12-26

## 2022-12-26 ENCOUNTER — HOSPITAL ENCOUNTER (OUTPATIENT)
Facility: CLINIC | Age: 52
End: 2022-12-26
Attending: SURGERY | Admitting: SURGERY
Payer: COMMERCIAL

## 2022-12-26 NOTE — TELEPHONE ENCOUNTER
Screening Questions  BLUE  KIND OF PREP RED  LOCATION [review exclusion criteria] GREEN  SEDATION TYPE        Y Are you active on mychart?       Heather Ordering/Referring Provider?        [facility] What type of coverage do you have?      N Have you had a positive covid test in the last 14 days?     25.26 1. BMI  [BMI 40+ - review exclusion criteria]    Y  2. Are you able to give consent for your medical care? [IF NO,RN REVIEW]        N  3. Are you taking any prescription pain medications on a routine schedule?      N  3a. EXTENDED PREP What kind of prescription?     N 4. Do you have any chemical dependencies such as alcohol, street drugs, or methadone?    N 5. Do you have any history of post-traumatic stress syndrome, severe anxiety or history of psychosis?      **If yes 3- 5 , please schedule with MAC sedation.**          IF YES TO ANY 6 - 10 - HOSPITAL SETTING ONLY.     N 6.   Do you need assistance transferring?     N 7.   Have you had a heart or lung transplant?    N 8.   Are you currently on dialysis?   N 9.   Do you use daily home oxygen?   N 10. Do you take nitroglycerin?   10a. N If yes, how often?     11. [FEMALES]  N Are you currently pregnant?    11a. N If yes, how many weeks? [ Greater than 12 weeks, OR NEEDED]    N 12. Do you have Pulmonary Hypertension? *NEED PAC APPT AT [facility]*     N 13. [review exclusion criteria]  Do you have any implantable devices in your body (pacemaker, defib, LVAD)?    N 14. In the past 6 months, have you had any heart related issues including cardiomyopathy or heart attack?     14a. N If yes, did it require cardiac stenting if so when?     N 15. Have you had a stroke or Transient ischemic attack (TIA - aka  mini stroke ) within 6 months?      N 16. Do you have mod to severe Obstructive Sleep Apnea?  [Hospital only - Ok at [place]]    N 17. Do you have SEVERE AND UNCONTROLLED asthma? *NEED PAC APPT AT [facility]*     N 18. Are you currently taking any blood thinners?  "    18a. If yes, inform patient to \"follow up w/ ordering provider for bridging instructions.\"    N 19. Do you take the medication Phentermine?    19a. If yes, \"Hold for 7 days before procedure.  Please consult your prescribing provider if you have questions about holding this medication.\"     N  20. Do you have chronic kidney disease?      N  21. Do you have a diagnosis of diabetes?     N  22. On a regular basis do you go 3-5 days between bowel movements?     See below 23. Preferred LOCAL Pharmacy for Pre Prescription    [ LIST ONLY ONE PHARMACY]        [facility] [place] [address] [facility] [place], [place] - [address]        - CLOSING REMINDERS -    Informed patient they will need an adult    Cannot take any type of public or medical transportation alone    Conscious Sedation- Needs  for 6 hours after the procedure       MAC/General-Needs  for 24 hours after procedure    Pre-Procedure Covid test to be completed [ESSC PCR Testing Required]    Confirmed Nurse will call to complete assessment       - SCHEDULING DETAILS -  N Hospital Setting Required? If yes, what is the exclusion?: N   Davis  Surgeon    12-29-22  Date of Procedure  Procedure Upper and Lower Endoscopy  [facility] GOLYTELY-If you answer yes to questions #8, #20, #21Which Colonoscopy Prep was Sent?     GEB Sedation Type     N PAC / Pre-op Required     Patient was originally scheduled at [facility] has since called [facility] to schedule    She has [employer] L[employer]ely however may need other products please contact patient to review prep products with her                "

## 2023-01-20 ENCOUNTER — TELEPHONE (OUTPATIENT)
Dept: FAMILY MEDICINE | Facility: CLINIC | Age: 53
End: 2023-01-20

## 2023-01-20 DIAGNOSIS — R19.7 DIARRHEA, UNSPECIFIED TYPE: ICD-10-CM

## 2023-01-20 DIAGNOSIS — R10.13 ABDOMINAL PAIN, EPIGASTRIC: Primary | ICD-10-CM

## 2023-01-20 NOTE — TELEPHONE ENCOUNTER
Dr. Romero:    Patient now says that she does not need a referral to see [facility] but will see them without a referral and that she has been told by her insurance that now needs to cancel this referral al together as it effect payment.    Ok to cancel this referral?      CAROL Figueroa

## 2023-01-20 NOTE — TELEPHONE ENCOUNTER
Dr. Romero:    Please clarify, you want the patient to call [facility] directly for this order? You do not want to place it to he [facility]?      CAROL Figueroa

## 2023-01-20 NOTE — TELEPHONE ENCOUNTER
Patient calling requesting new referral for Colonoscopy and Endoscopy.   States she was able to get into [facility] sooner.     Pended order    [facility] \[facility]\""  Dr. Shakir Hickey    Cancelled current order as insurance told patient they will not accept that referral for [facility]. Needs to be [facility] and cannot have two referrals on file.     Shazia Pickard RN on 1/20/2023 at 12:46 PM

## 2023-01-20 NOTE — TELEPHONE ENCOUNTER
Maybe I am mistaken -I was under the impression that she has seen a physician at [facility] in the past for a consult.  Generally, when she is following with them I would recommend that they place the referrals (for insurance purposes).    Otherwise, I did place the referral if it is helpful    EB

## 2023-01-20 NOTE — TELEPHONE ENCOUNTER
I would recommend that she contact her doctor over at [facility] to get these referrals.  That way the results will go to the and they will be able to take action upon them.    Thanks    EB

## 2023-01-25 ENCOUNTER — TRANSFERRED RECORDS (OUTPATIENT)
Dept: HEALTH INFORMATION MANAGEMENT | Facility: CLINIC | Age: 53
End: 2023-01-25
Payer: COMMERCIAL

## 2023-02-02 ENCOUNTER — TELEPHONE (OUTPATIENT)
Dept: FAMILY MEDICINE | Facility: CLINIC | Age: 53
End: 2023-02-02
Payer: COMMERCIAL

## 2023-02-02 ENCOUNTER — VIRTUAL VISIT (OUTPATIENT)
Dept: INTERNAL MEDICINE | Facility: CLINIC | Age: 53
End: 2023-02-02
Payer: COMMERCIAL

## 2023-02-02 DIAGNOSIS — T30.0 BURN: Primary | ICD-10-CM

## 2023-02-02 PROCEDURE — 99213 OFFICE O/P EST LOW 20 MIN: CPT | Mod: GT | Performed by: NURSE PRACTITIONER

## 2023-02-02 RX ORDER — SILVER SULFADIAZINE 10 MG/G
CREAM TOPICAL 2 TIMES DAILY
Qty: 25 G | Refills: 0 | Status: SHIPPED | OUTPATIENT
Start: 2023-02-02 | End: 2023-10-16

## 2023-02-02 NOTE — PROGRESS NOTES
"Daphnie is a 52 year old who is being evaluated via a billable video visit.      How would you like to obtain your AVS? MyChart  If the video visit is dropped, the invitation should be resent by: Text to cell phone: [phone]  Will anyone else be joining your video visit? No          Assessment & Plan   Problem List Items Addressed This Visit    None  Visit Diagnoses     Burn    -  Primary    Relevant Medications    silver sulfADIAZINE (SILVADENE) 1 % external cream         Advised to keep the area clean and dry recommend Silvadene twice daily.  Continue to monitor and did review signs of infection when to seek medical care follow-up as needed           BMI:   Estimated body mass index is 25.26 kg/m  as calculated from the following:    Height as of 12/7/22: 1.664 m (5' 5.5\").    Weight as of 12/7/22: 69.9 kg (154 lb 2 oz).           No follow-ups on file.    Brianda Rushing NP  [facility]    Demetrio Eller is a 52 year old, presenting for the following health issues:  Patient sustained a burn to her left index finger this morning    HPI       Review of Systems         Objective           Vitals:  No vitals were obtained today due to virtual visit.    Physical Exam   GENERAL: Healthy, alert and no distress  EYES: Eyes grossly normal to inspection.  No discharge or erythema, or obvious scleral/conjunctival abnormalities.  RESP: No audible wheeze, cough, or visible cyanosis.  No visible retractions or increased work of breathing.    SKIN: left index finger, blister on the pad after touching the hot stove   NEURO: Cranial nerves grossly intact.  Mentation and speech appropriate for age.  PSYCH: Mentation appears normal, affect normal/bright, judgement and insight intact, normal speech and appearance well-groomed.                Current Outpatient Medications:      escitalopram (LEXAPRO) 10 MG tablet, Take 1 tablet (10 mg) by mouth daily, Disp: 90 tablet, Rfl: 3     omeprazole (PRILOSEC) 20 MG " DR capsule, Take 1 capsule (20 mg) by mouth daily, Disp: 30 capsule, Rfl: 0     ondansetron (ZOFRAN) 4 MG tablet, , Disp: , Rfl:      propranolol (INDERAL) 20 MG tablet, TAKE 1 TABLET (20 MG) BY MOUTH 3 TIMES DAILY AS NEEDED (PANIC ATTACKS, PALPITATIONS), Disp: 30 tablet, Rfl: 0     rizatriptan (MAXALT) 10 MG tablet, Reported on 5/11/2017, Disp: , Rfl:   Video-Visit Details    Type of service:  Video Visit     Originating Location (pt. Location): Home    Distant Location (provider location):  On-site  Platform used for Video Visit: [facility]

## 2023-02-02 NOTE — TELEPHONE ENCOUNTER
Patient called requesting recommendations for a burn to her left index finger pad.    Patient states she got a new stove, flat top and went to clean off a beto on it.  Patient forgot she had just used the stove and it was hot.  Injury occurred 5  minutes before the call.  Reports pad of left index finger is swollen, white, painful and blister appearing.  Patient immediately applied a cool washcloth to the area.    Per triage protocol manual, advised patient to be seen in UC within 24 hrs due to   Pain, swelling, discoloration.    Patient states she will not go to UC at this time, she will call back if symptoms worsen.    Advised cool water to finger for 24-48 hr avoiding ice to skin.  Elevate above heart level.  Reviewed rationale of thermal injury needing evaluation and risk of infection if blister breaks open.  Patient verbalized understanding and continued to decline UC at this time.  Yvette Nolasco RN

## 2023-02-16 ENCOUNTER — MYC MEDICAL ADVICE (OUTPATIENT)
Dept: FAMILY MEDICINE | Facility: CLINIC | Age: 53
End: 2023-02-16
Payer: COMMERCIAL

## 2023-02-27 ENCOUNTER — TRANSFERRED RECORDS (OUTPATIENT)
Dept: HEALTH INFORMATION MANAGEMENT | Facility: CLINIC | Age: 53
End: 2023-02-27
Payer: COMMERCIAL

## 2023-05-30 ENCOUNTER — LAB (OUTPATIENT)
Dept: LAB | Facility: CLINIC | Age: 53
End: 2023-05-30
Payer: COMMERCIAL

## 2023-05-30 ENCOUNTER — VIRTUAL VISIT (OUTPATIENT)
Dept: FAMILY MEDICINE | Facility: CLINIC | Age: 53
End: 2023-05-30
Payer: COMMERCIAL

## 2023-05-30 DIAGNOSIS — R09.81 NASAL CONGESTION: ICD-10-CM

## 2023-05-30 DIAGNOSIS — J01.00 ACUTE NON-RECURRENT MAXILLARY SINUSITIS: Primary | ICD-10-CM

## 2023-05-30 DIAGNOSIS — J02.9 SORE THROAT: ICD-10-CM

## 2023-05-30 DIAGNOSIS — J30.1 SEASONAL ALLERGIC RHINITIS DUE TO POLLEN: ICD-10-CM

## 2023-05-30 LAB
DEPRECATED S PYO AG THROAT QL EIA: NEGATIVE
GROUP A STREP BY PCR: NOT DETECTED

## 2023-05-30 PROCEDURE — 87651 STREP A DNA AMP PROBE: CPT

## 2023-05-30 PROCEDURE — 99213 OFFICE O/P EST LOW 20 MIN: CPT | Mod: VID | Performed by: FAMILY MEDICINE

## 2023-05-30 PROCEDURE — 87635 SARS-COV-2 COVID-19 AMP PRB: CPT

## 2023-05-30 RX ORDER — NORTRIPTYLINE HCL 10 MG
CAPSULE ORAL
COMMUNITY
Start: 2023-05-09 | End: 2024-08-05

## 2023-05-30 RX ORDER — SUMATRIPTAN 50 MG/1
TABLET, FILM COATED ORAL
COMMUNITY
Start: 2023-05-16 | End: 2024-08-05

## 2023-05-30 NOTE — PATIENT INSTRUCTIONS
Sudafed - psuedophedrine - works well. Have to buy it from the pharmacy. 12 or 24hr version    Afrin, then nasal saline/hot shower/etc.     Then apply Nasal steroid  - flonase/nasonex  Use it daily.     Daily over the counter med for allergies   - claritin - loratadine   - zyrtec - cetirizine  ** - once a day, not sedating, most effective . 10mg   - allegra - fexofenadine    - benadryl - diphenhydramine

## 2023-05-30 NOTE — PROGRESS NOTES
"Daphnie is a 53 year old who is being evaluated via a billable video visit.      How would you like to obtain your AVS? MyChart  If the video visit is dropped, the invitation should be resent by: Text to cell phone: [phone]  Will anyone else be joining your video visit? No        Assessment & Plan     Acute non-recurrent maxillary sinusitis  Seasonal allergic rhinitis due to pollen  Nasal congestion  Sore throat  Likely viral/allergic sinusitis. Discussed sinus cares including sinus washes, rest, and fluids. Sudafed for decongestion as needed. Can take Afrin x 2-3d then stop. Discussed using nasal steroid to decrease inflammation and continue for 2-3 weeks.  No antibiotics needed at this time. If symptoms persist/worsen, consider adding in antibiotics.  Also encouraged her to stay on her allergy meds for the next couple months. The patient indicates understanding of these issues and agrees with the plan.  Return to clinic or call if not improving.    - Streptococcus A Rapid Screen w/Reflex to PCR - Clinic Collect; Future  - Symptomatic COVID-19 Virus (Coronavirus) by PCR Nose; Future       BMI:   Estimated body mass index is 25.26 kg/m  as calculated from the following:    Height as of 12/7/22: 1.664 m (5' 5.5\").    Weight as of 12/7/22: 69.9 kg (154 lb 2 oz).   Weight management plan: Discussed healthy diet and exercise guidelines    Work on weight loss  Regular exercise    Leigh Ann Worthy MD  [facility] THONY Eller is a 53 year old, presenting for the following health issues:  Nasal Congestion        5/30/2023     2:07 PM   Additional Questions   Roomed by JUDI Sky   Accompanied by self         5/30/2023     2:07 PM   Patient Reported Additional Medications   Patient reports taking the following new medications none     HPI     Acute Illness  Acute illness concerns: worsening congestion.  Traveled by plane recently.   Onset/Duration: 15 days  Symptoms:  Fever: " No  Chills/Sweats: No  Headache (location?): No  Sinus Pressure: no  Conjunctivitis:  No  Ear Pain: YES: right pain, fullness  Rhinorrhea: No  Congestion: YES- .green   Sore Throat: YES- nasal drainage, thick, some soreness from coughing  Cough: YES-   Wheeze: No  Decreased Appetite: No  Nausea: No  Vomiting: No  Diarrhea: No  Dysuria/Freq.: No  Dysuria or Hematuria: No  Fatigue/Achiness: No  Sick/Strep Exposure: YES- daughter was very congested- dx with sinus infection   Therapies tried and outcome: was tested for strep & covid this morning at the clinic.      Was with daughter and grandkids - they were sick  She got similar illness and used mucinex and it got better a bit   Felt better and traveled last weekend to [place]    Feels face/nose congested   Ears are full - painful on the plane   Coughing up chunks  Sore throat - mild  Chest is clear     Usually takes zyrtec and occasional flonase   Stopped the mucinex      Review of Systems   Constitutional, HEENT, cardiovascular, pulmonary, gi and gu systems are negative, except as otherwise noted.      Objective           Vitals:  No vitals were obtained today due to virtual visit.    Physical Exam   GENERAL: Healthy, alert and no distress  EYES: Eyes grossly normal to inspection.  No discharge or erythema, or obvious scleral/conjunctival abnormalities.  RESP: No audible wheeze, cough, or visible cyanosis.  No visible retractions or increased work of breathing.    SKIN: Visible skin clear. No significant rash, abnormal pigmentation or lesions.  NEURO: Cranial nerves grossly intact.  Mentation and speech appropriate for age.  PSYCH: Mentation appears normal, affect normal/bright, judgement and insight intact, normal speech and appearance well-groomed.    Results for orders placed or performed in visit on 05/30/23   Streptococcus A Rapid Screen w/Reflex to PCR - Clinic Collect     Status: Normal    Specimen: Throat; Swab   Result Value Ref Range    Group A Strep  antigen Negative Negative        Video-Visit Details    Type of service:  Video Visit     Originating Location (pt. Location): Home  Distant Location (provider location):  On-site  Platform used for Video Visit: [place] [facility]

## 2023-05-31 LAB — SARS-COV-2 RNA RESP QL NAA+PROBE: NEGATIVE

## 2023-06-05 ENCOUNTER — OFFICE VISIT (OUTPATIENT)
Dept: URGENT CARE | Facility: URGENT CARE | Age: 53
End: 2023-06-05
Payer: COMMERCIAL

## 2023-06-05 VITALS
SYSTOLIC BLOOD PRESSURE: 109 MMHG | HEART RATE: 102 BPM | BODY MASS INDEX: 25.47 KG/M2 | OXYGEN SATURATION: 98 % | RESPIRATION RATE: 16 BRPM | DIASTOLIC BLOOD PRESSURE: 69 MMHG | WEIGHT: 155.4 LBS | TEMPERATURE: 97.7 F

## 2023-06-05 DIAGNOSIS — J01.90 SUBACUTE SINUSITIS, UNSPECIFIED LOCATION: Primary | ICD-10-CM

## 2023-06-05 PROCEDURE — 99213 OFFICE O/P EST LOW 20 MIN: CPT | Performed by: FAMILY MEDICINE

## 2023-06-05 RX ORDER — CETIRIZINE HYDROCHLORIDE 10 MG/1
10 TABLET ORAL DAILY PRN
COMMUNITY

## 2023-06-05 RX ORDER — PREDNISONE 20 MG/1
TABLET ORAL
Qty: 12 TABLET | Refills: 0 | Status: SHIPPED | OUTPATIENT
Start: 2023-06-05 | End: 2023-10-16

## 2023-06-05 RX ORDER — FLUTICASONE PROPIONATE 50 MCG
1 SPRAY, SUSPENSION (ML) NASAL DAILY
COMMUNITY
End: 2023-11-03

## 2023-06-05 NOTE — PROGRESS NOTES
(J01.90) Subacute sinusitis, unspecified location  (primary encounter diagnosis)  Comment:   There may also be an element of bronchitis.  Plan: amoxicillin-clavulanate (AUGMENTIN) 875-125 MG         tablet, predniSONE (DELTASONE) 20 MG tablet              CHIEF COMPLAINT    Persistent congestion 3 to 4 weeks.      HISTORY    This patient had an initial respiratory infection with cough, congestion, aching.  This was 3 to 4 weeks ago.  She has been attempting conservative treatment with OTC medications but has not had resolution of symptoms.  She has had congestion.  She has some hoarseness.  She has a cough with a small amount of yellowish sputum.  Sometimes cough is barky in nature.      REVIEW OF SYSTEMS    No fever.  No sore throat.  No wheezing or SOB.  No chest pain.  No rash.      EXAM  /69 (BP Location: Right arm, Cuff Size: Adult Regular)   Pulse 102   Temp 97.7  F (36.5  C) (Tympanic)   Resp 16   Wt 70.5 kg (155 lb 6.4 oz)   LMP 01/01/2020   SpO2 98%   BMI 25.47 kg/m      Conjunctiva clear.  TMs WNL.  Pharynx not inflamed.  Absent tonsils.  Mildly enlarged anterior cervical nodes.  Chest clear.

## 2023-07-09 ENCOUNTER — HEALTH MAINTENANCE LETTER (OUTPATIENT)
Age: 53
End: 2023-07-09

## 2023-08-17 DIAGNOSIS — F43.22 ADJUSTMENT DISORDER WITH ANXIETY: ICD-10-CM

## 2023-08-18 RX ORDER — ESCITALOPRAM OXALATE 10 MG/1
10 TABLET ORAL DAILY
Qty: 90 TABLET | Refills: 0 | Status: SHIPPED | OUTPATIENT
Start: 2023-08-18 | End: 2023-10-16

## 2023-08-18 NOTE — TELEPHONE ENCOUNTER
"Routing refill request to provider for review/approval because:  Drug interaction warning       Requested Prescriptions   Pending Prescriptions Disp Refills    escitalopram (LEXAPRO) 10 MG tablet [Pharmacy Med Name: ESCITALOPRAM OXALATE 10MG TABS] 90 tablet 0     Sig: TAKE ONE TABLET BY MOUTH ONCE DAILY       SSRIs Protocol Passed - 8/17/2023 10:11 PM        Passed - Recent (12 mo) or future (30 days) visit within the authorizing provider's specialty     Patient has had an office visit with the authorizing provider or a provider within the authorizing providers department within the previous 12 mos or has a future within next 30 days. See \"Patient Info\" tab in inbasket, or \"Choose Columns\" in Meds & Orders section of the refill encounter.              Passed - Medication is active on med list        Passed - Patient is age 18 or older        Passed - No active pregnancy on record        Passed - No positive pregnancy test in last 12 months                 Lupe Kohli RN 08/18/23 8:19 AM   "

## 2023-08-18 NOTE — TELEPHONE ENCOUNTER
Please call her and do phq/simone    Remind her to schedule well visit     I will give 90d no refills     CATE Worthy MD

## 2023-08-21 NOTE — TELEPHONE ENCOUNTER
Sent my chart message with attached PHQ MERY also advised pt in office visit is due for med refill and physical.

## 2023-09-17 ENCOUNTER — HEALTH MAINTENANCE LETTER (OUTPATIENT)
Age: 53
End: 2023-09-17

## 2023-10-06 ENCOUNTER — TELEPHONE (OUTPATIENT)
Dept: FAMILY MEDICINE | Facility: CLINIC | Age: 53
End: 2023-10-06

## 2023-10-06 NOTE — TELEPHONE ENCOUNTER
Patient called today asking for a referral to see a neurologist in the [place] system.   She has been battling headaches for 15 years. She has been seeing a neurologist through [facility]. She said they just keep telling her it is her age, she thinks she needs to see someone else.   She would like to get a second opinion and see a neurologist through the [place] system.     Wondering if Dr. Worthy could place the referral or if she needs to get it from [facility].     Routing to Dr Worthy.   Lupe Kohli RN on 10/6/2023 at 9:50 AM

## 2023-10-06 NOTE — TELEPHONE ENCOUNTER
CERTIFICATE OF RETURN TO WORK      This is to certify that Dillon Pineda has been under my care from 12/27/2022 and can return to regular work on 01/03/2023.        RESTRICTIONS: none            SIGNATURE:___________________________________________ 12/27/2022                        Wilver Anna DO      [facility]    [address] [place]  [address]     Rn called patient and notified her that she should address this with Angeli Nixon at her upcoming visit. Patient understands.  Lupe Kohli RN on 10/6/2023 at 12:35 PM

## 2023-10-16 ENCOUNTER — OFFICE VISIT (OUTPATIENT)
Dept: FAMILY MEDICINE | Facility: CLINIC | Age: 53
End: 2023-10-16
Payer: COMMERCIAL

## 2023-10-16 VITALS
BODY MASS INDEX: 25.71 KG/M2 | DIASTOLIC BLOOD PRESSURE: 70 MMHG | HEIGHT: 66 IN | RESPIRATION RATE: 16 BRPM | SYSTOLIC BLOOD PRESSURE: 110 MMHG | WEIGHT: 160 LBS | HEART RATE: 98 BPM | TEMPERATURE: 98 F | OXYGEN SATURATION: 98 %

## 2023-10-16 DIAGNOSIS — F43.22 ADJUSTMENT DISORDER WITH ANXIETY: ICD-10-CM

## 2023-10-16 DIAGNOSIS — Z00.00 HEALTHCARE MAINTENANCE: Primary | ICD-10-CM

## 2023-10-16 DIAGNOSIS — Z12.31 VISIT FOR SCREENING MAMMOGRAM: ICD-10-CM

## 2023-10-16 DIAGNOSIS — H69.91 DYSFUNCTION OF RIGHT EUSTACHIAN TUBE: ICD-10-CM

## 2023-10-16 DIAGNOSIS — G43.109 MIGRAINE WITH AURA AND WITHOUT STATUS MIGRAINOSUS, NOT INTRACTABLE: ICD-10-CM

## 2023-10-16 DIAGNOSIS — F41.1 GENERALIZED ANXIETY DISORDER: ICD-10-CM

## 2023-10-16 DIAGNOSIS — E78.5 HYPERLIPIDEMIA LDL GOAL <100: ICD-10-CM

## 2023-10-16 PROCEDURE — 99396 PREV VISIT EST AGE 40-64: CPT | Performed by: NURSE PRACTITIONER

## 2023-10-16 PROCEDURE — 99214 OFFICE O/P EST MOD 30 MIN: CPT | Mod: 25 | Performed by: NURSE PRACTITIONER

## 2023-10-16 RX ORDER — ESCITALOPRAM OXALATE 10 MG/1
10 TABLET ORAL DAILY
Qty: 90 TABLET | Refills: 3 | Status: SHIPPED | OUTPATIENT
Start: 2023-10-16

## 2023-10-16 ASSESSMENT — ENCOUNTER SYMPTOMS
WEAKNESS: 0
EYE PAIN: 0
DIARRHEA: 0
HEMATURIA: 0
ARTHRALGIAS: 0
DIZZINESS: 0
PALPITATIONS: 0
HEMATOCHEZIA: 0
NAUSEA: 0
COUGH: 0
PARESTHESIAS: 0
HEARTBURN: 1
SHORTNESS OF BREATH: 0
HEADACHES: 0
JOINT SWELLING: 0
CHILLS: 0
FREQUENCY: 0
CONSTIPATION: 0
ABDOMINAL PAIN: 0
NERVOUS/ANXIOUS: 0
DYSURIA: 0
FEVER: 0
MYALGIAS: 0
SORE THROAT: 0
BREAST MASS: 0

## 2023-10-16 NOTE — PROGRESS NOTES
SUBJECTIVE:   CC: Daphnie is an 53 year old who presents for preventive health visit.       Healthy Habits:     Getting at least 3 servings of Calcium per day:  Yes    Bi-annual eye exam:  Yes    Dental care twice a year:  Yes    Sleep apnea or symptoms of sleep apnea:  None    Diet:  Gluten-free/reduced and Other    Frequency of exercise:  None    Taking medications regularly:  Yes    Barriers to taking medications:  None    Medication side effects:  Other    Additional concerns today:  Yes      Ear problem - hearing fluid at times.  Nortriptyline giving her heartburn and some hot flashes,  Would like a referral to a new neurologist.    Gluten intolerance;  Neck swelling intermittent  Anxiety-takes Lexapro 10 mg, no side effects, would like to continue.   Migraines:     Nutrition: gluten free diet  Exercise/movement:none  Sleep: nortriptyline is helping,   Stress: none;   Alcohol: none  Tobacco: none  Drugs: none    Family history  Parents passed from COVID during Covid  Grandparents  PGF: diabetes      Today's PHQ-2 Score:       10/16/2023     8:18 AM   PHQ-2 (  [employer])   Q1: Little interest or pleasure in doing things 0   Q2: Feeling down, depressed or hopeless 0   PHQ-2 Score 0   Q1: Little interest or pleasure in doing things Not at all   Q2: Feeling down, depressed or hopeless Not at all   PHQ-2 Score 0         Social History     Tobacco Use    Smoking status: Never    Smokeless tobacco: Never   Substance Use Topics    Alcohol use: No     Comment: rarly on weekends             10/16/2023     8:18 AM   Alcohol Use   Prescreen: >3 drinks/day or >7 drinks/week? Not Applicable     Reviewed orders with patient.  Reviewed health maintenance and updated orders accordingly - Yes  Lab work is in process    Breast Cancer Screenin/26/2022     6:39 AM   Breast CA Risk Assessment (FHS-7)   Do you have a family history of breast, colon, or ovarian cancer? No / Unknown           Pertinent mammograms are  reviewed under the imaging tab.    History of abnormal Pap smear: NO - age 30-65 PAP every 5 years with negative HPV co-testing recommended      Latest Ref Rng & Units 7/26/2022     2:57 PM   PAP / HPV   PAP  Negative for Intraepithelial Lesion or Malignancy (NILM)    HPV 16 DNA Negative Negative    HPV 18 DNA Negative Negative    Other HR HPV Negative Negative      Reviewed and updated as needed this visit by clinical staff    Allergies  Meds              Reviewed and updated as needed this visit by Provider                 Past Medical History:   Diagnosis Date    History of blood transfusion Age 3 years old.    NO ACTIVE PROBLEMS       Past Surgical History:   Procedure Laterality Date    BIOPSY  5+yrs ago    breast tissue and mouth tissue and cervical tissue - all nor    COLONOSCOPY  5/22/2013    normal    TONSILLECTOMY & ADENOIDECTOMY         Review of Systems   Constitutional:  Negative for chills and fever.   HENT:  Negative for congestion, ear pain, hearing loss and sore throat.    Eyes:  Negative for pain and visual disturbance.   Respiratory:  Negative for cough and shortness of breath.    Cardiovascular:  Negative for chest pain, palpitations and peripheral edema.   Gastrointestinal:  Positive for heartburn. Negative for abdominal pain, constipation, diarrhea, hematochezia and nausea.   Breasts:  Negative for tenderness, breast mass and discharge.   Genitourinary:  Negative for dysuria, frequency, genital sores, hematuria, pelvic pain, urgency, vaginal bleeding and vaginal discharge.   Musculoskeletal:  Negative for arthralgias, joint swelling and myalgias.   Skin:  Negative for rash.   Neurological:  Negative for dizziness, weakness, headaches and paresthesias.   Psychiatric/Behavioral:  Negative for mood changes. The patient is not nervous/anxious.           OBJECTIVE:   /70 (BP Location: Right arm, Patient Position: Sitting, Cuff Size: Adult Regular)   Pulse 98   Temp 98  F (36.7  C)   Resp  "16   Ht 1.664 m (5' 5.5\")   Wt 72.6 kg (160 lb)   LMP 01/01/2020   SpO2 98%   BMI 26.22 kg/m    Physical Exam  Constitutional:       Appearance: Normal appearance.   HENT:      Head: Normocephalic.      Right Ear: Tympanic membrane, ear canal and external ear normal.      Left Ear: Tympanic membrane, ear canal and external ear normal.      Nose: Nose normal.      Mouth/Throat:      Mouth: Mucous membranes are moist.      Pharynx: Oropharynx is clear.      Tonsils: 0 on the right. 0 on the left.   Eyes:      Extraocular Movements: Extraocular movements intact.      Conjunctiva/sclera: Conjunctivae normal.      Pupils: Pupils are equal, round, and reactive to light.   Neck:      Thyroid: No thyroid mass, thyromegaly or thyroid tenderness.      Trachea: Trachea normal.   Cardiovascular:      Rate and Rhythm: Normal rate and regular rhythm.      Heart sounds: Normal heart sounds.   Pulmonary:      Effort: Pulmonary effort is normal.      Breath sounds: Normal breath sounds.   Abdominal:      General: Abdomen is flat. Bowel sounds are normal.   Musculoskeletal:         General: Normal range of motion.      Cervical back: Normal range of motion.   Lymphadenopathy:      Cervical: No cervical adenopathy.   Skin:     General: Skin is warm and dry.   Neurological:      Mental Status: She is alert and oriented to person, place, and time.   Psychiatric:         Mood and Affect: Mood normal.         Behavior: Behavior normal.         Thought Content: Thought content normal.         Judgment: Judgment normal.               ASSESSMENT/PLAN:     Problem List Items Addressed This Visit       Migraine with aura and without status migrainosus, not intractable     Would like to reassess her migraines with a different neurologist  Gluten free diet  Medications include nortriptyline at nighttime, sumatriptan as an abortive medicine.         Relevant Medications    escitalopram (LEXAPRO) 10 MG tablet    Other Relevant Orders    " Neurology  Referral (Migraine Care Package)    Adjustment disorder with anxiety    Relevant Medications    escitalopram (LEXAPRO) 10 MG tablet    Healthcare maintenance - Primary     Pap smear due 4/2024  Colonoscopy done 1/252023 repeat in 10 years  Mammogram done 2021  Declines HIV/hepatitis C, vaccines COVID influenza, hepatitis B.  She will consider shingles vaccine but would like to check with her insurance and consider getting it in the pharmacy.             Relevant Orders    Insulin level    Comprehensive metabolic panel (BMP + Alb, Alk Phos, ALT, AST, Total. Bili, TP)    Dysfunction of right eustachian tube     History of allergies she is currently not treating them.  Discussed using Flonase consistently daily to help reduce her daily allergy symptoms and treat station tube dysfunction.  We also discussed Zyrtec and Sudafed as options to help decrease nasal congestion, eustachian tube dysfunction.         Generalized anxiety disorder     Anxiety is under control right now, would like to continue on Lexapro 10 mg.  Prescription sent for 1 year.         Relevant Medications    escitalopram (LEXAPRO) 10 MG tablet    Hyperlipidemia LDL goal <100     Recent Labs   Lab Test 12/07/22  1006   CHOL 260*   HDL 48*   *   TRIG 292*   TG/HDL ratio: 6.1  Reference Ranges:  Optimal range 0.5-1.9  Some insulin resistance 2.0-3.0  Significant insulin resistance and heart disease risk is found at ratios >3.0    The 10-year ASCVD risk score (Melissa VORA, et al., 2019) is: 2.2%    Values used to calculate the score:      Age: 53 years      Sex: Female      Is Non- : No      Diabetic: No      Tobacco smoker: No      Systolic Blood Pressure: 110 mmHg      Is BP treated: No      HDL Cholesterol: 48 mg/dL      Total Cholesterol: 260 mg/dL    Family history  Parents passed from COVID during Covid  Grandparents  PGF: diabetes    MDM: With elevated triglyceride level, non-HDL, low HDL and high  LDL she does have some risk factors for heart disease.  Elevated triglyceride levels and triglyceride/HDL ratio indicates elevated risk of insulin resistance.  Plan: Discussed with patient that we need to recheck labs, lipids, fasting insulin/glucose and A1c to assess for both diabetes and her risk of insulin resistance.  We also discussed improvements in eating, resistance training and exercise, fish oil supplementation to help improve her triglycerides and risk of cardiovascular disease.         Relevant Orders    Lipid panel reflex to direct LDL Fasting    Hemoglobin A1c    Apolipoprotein B    PRIMARY CARE FOLLOW-UP SCHEDULING     Other Visit Diagnoses       Visit for screening mammogram        Relevant Orders    MA SCREENING DIGITAL BILAT - Future  (s+30)            Patient has been advised of split billing requirements and indicates understanding: Yes      COUNSELING:  Counseled on healthy diet, exercise physical activity, stress management, alcohol usage, and sleep.          She reports that she has never smoked. She has never used smokeless tobacco.      CRISSY Sotomayor CNP  [facility]

## 2023-10-16 NOTE — PATIENT INSTRUCTIONS
Magnesium 200-400 mg    [facility]   [email or web address]    [facility] [place] [place].[facility]  [address], [place] [address]   ~20.6 mi  [phone]  Dr Yvette Matthew  [email or web address]  [address] [place] [address]   ~7.7 mi  [phone]

## 2023-10-16 NOTE — ASSESSMENT & PLAN NOTE
Anxiety is under control right now, would like to continue on Lexapro 10 mg.  Prescription sent for 1 year.  
History of allergies she is currently not treating them.  Discussed using Flonase consistently daily to help reduce her daily allergy symptoms and treat station tube dysfunction.  We also discussed Zyrtec and Sudafed as options to help decrease nasal congestion, eustachian tube dysfunction.  
Pap smear due 4/2024  Colonoscopy done 1/252023 repeat in 10 years  Mammogram done 2021  Declines HIV/hepatitis C, vaccines COVID influenza, hepatitis B.  She will consider shingles vaccine but would like to check with her insurance and consider getting it in the pharmacy.      
Recent Labs   Lab Test 12/07/22  1006   CHOL 260*   HDL 48*   *   TRIG 292*     TG/HDL ratio: 6.1  Reference Ranges:  Optimal range 0.5-1.9  Some insulin resistance 2.0-3.0  Significant insulin resistance and heart disease risk is found at ratios >3.0    The 10-year ASCVD risk score (Melissa VORA, et al., 2019) is: 2.2%    Values used to calculate the score:      Age: 53 years      Sex: Female      Is Non- : No      Diabetic: No      Tobacco smoker: No      Systolic Blood Pressure: 110 mmHg      Is BP treated: No      HDL Cholesterol: 48 mg/dL      Total Cholesterol: 260 mg/dL    Family history  Parents passed from COVID during Covid  Grandparents  PGF: diabetes    MDM: With elevated triglyceride level, non-HDL, low HDL and high LDL she does have some risk factors for heart disease.  Elevated triglyceride levels and triglyceride/HDL ratio indicates elevated risk of insulin resistance.  Plan: Discussed with patient that we need to recheck labs, lipids, fasting insulin/glucose and A1c to assess for both diabetes and her risk of insulin resistance.  We also discussed improvements in eating, resistance training and exercise, fish oil supplementation to help improve her triglycerides and risk of cardiovascular disease.  
Would like to reassess her migraines with a different neurologist  Gluten free diet  Medications include nortriptyline at nighttime, sumatriptan as an abortive medicine.  
[Negative] : Heme/Lymph

## 2023-11-03 ENCOUNTER — OFFICE VISIT (OUTPATIENT)
Dept: FAMILY MEDICINE | Facility: CLINIC | Age: 53
End: 2023-11-03
Payer: COMMERCIAL

## 2023-11-03 VITALS
RESPIRATION RATE: 17 BRPM | HEART RATE: 98 BPM | SYSTOLIC BLOOD PRESSURE: 115 MMHG | TEMPERATURE: 97.9 F | WEIGHT: 158.2 LBS | DIASTOLIC BLOOD PRESSURE: 81 MMHG | BODY MASS INDEX: 25.93 KG/M2 | OXYGEN SATURATION: 99 %

## 2023-11-03 DIAGNOSIS — R09.81 CONGESTION OF PARANASAL SINUS: ICD-10-CM

## 2023-11-03 DIAGNOSIS — J02.9 SORE THROAT: Primary | ICD-10-CM

## 2023-11-03 LAB
DEPRECATED S PYO AG THROAT QL EIA: NEGATIVE
FLUAV AG SPEC QL IA: NEGATIVE
FLUBV AG SPEC QL IA: NEGATIVE
GROUP A STREP BY PCR: NOT DETECTED

## 2023-11-03 PROCEDURE — 99213 OFFICE O/P EST LOW 20 MIN: CPT

## 2023-11-03 PROCEDURE — 87804 INFLUENZA ASSAY W/OPTIC: CPT

## 2023-11-03 PROCEDURE — 87651 STREP A DNA AMP PROBE: CPT

## 2023-11-03 RX ORDER — PSEUDOEPHEDRINE HCL 30 MG
30 TABLET ORAL EVERY 6 HOURS PRN
Qty: 20 TABLET | Refills: 0 | Status: SHIPPED | OUTPATIENT
Start: 2023-11-03 | End: 2024-08-05

## 2023-11-03 RX ORDER — FLUTICASONE PROPIONATE 50 MCG
1 SPRAY, SUSPENSION (ML) NASAL DAILY
Qty: 9.9 ML | Refills: 0 | Status: SHIPPED | OUTPATIENT
Start: 2023-11-03 | End: 2024-01-02

## 2023-11-03 ASSESSMENT — PAIN SCALES - GENERAL: PAINLEVEL: MODERATE PAIN (4)

## 2023-11-03 NOTE — PROGRESS NOTES
Assessment & Plan     Sore throat    - Influenza A & B Antigen (Clinic collect)  - Streptococcus A Rapid Screen w/Reflex to PCR  - Streptococcus A Rapid Screen w/Reflex to PCR    Congestion of paranasal sinus  -Resume home antihistamine  - fluticasone (FLONASE) 50 MCG/ACT nasal spray  Dispense: 9.9 mL; Refill: 0  - pseudoePHEDrine (SUDAFED) 30 MG tablet  Dispense: 20 tablet; Refill: 0    [id number] not improved in 3 to 5 days, if symptoms worsen or if you develop difficulty swallowing, drooling, muffled voice, shortness of breath, chest pain report to UC/ER as discussed     CRISSY Nunez CNP  [facility] JULIAN Eller is a 53 year old, presenting for the following health issues:  Sinus Problem    Patient reports 1 week history of gradually worsening sore throat, nasal congestion (primarily right-sided), cough, fever.     She is additionally concerned of a tender palpable mass to right lateral aspect of mid neck.  She noticed this approximately same time she began to feel ill      Denies-chest pain, shortness of breath, difficulty swallowing, muffled voice, drooling, known sick exposures    Of note-tonsils removed in childhood.          11/3/2023     2:41 PM   Additional Questions   Roomed by Saida     Pt has had 2 negative covid tests    History of Present Illness       Reason for visit:  Lingering sore and swollen throat, excess discolored mucous and congedtion.  History of sinus infection.  Symptom onset:  1-2 weeks ago  Symptoms include:  Increased soreness and swelling of throat.  Change in mucous and congestion.  Symptom intensity:  Moderate  Symptom progression:  Worsening  Had these symptoms before:  Yes  Has tried/received treatment for these symptoms:  Yes  Previous treatment was successful:  Yes  Prior treatment description:  Antibiotics for sinus infection.    She eats 2-3 servings of fruits and vegetables daily.She consumes 1 sweetened beverage(s) daily.She exercises  with enough effort to increase her heart rate 9 or less minutes per day.  She exercises with enough effort to increase her heart rate 3 or less days per week.   She is taking medications regularly.       Pre-Provider Visit Orders - Rapid Strep  Does the patient have shortness of breath/trouble breathing, an earache, drooling/too much saliva, or any difficulty opening her mouth/moving neck?  No  Does the patient have a sore throat and either history of fever >100.4 in the previous 24 hours without a cough or recent exposure to a known case of strep throat? No            Review of Systems   Constitutional, HEENT, cardiovascular, pulmonary, gi and gu systems are negative, except as otherwise noted.      Objective    /81   Pulse 98   Temp 97.9  F (36.6  C) (Oral)   Resp 17   Wt 71.8 kg (158 lb 3.2 oz)   LMP 01/01/2020   SpO2 99%   BMI 25.93 kg/m    Body mass index is 25.93 kg/m .  Physical Exam   GENERAL: healthy, alert and no distress  NECK: Palpable anterior cervical lymph nodes, tender palpable 2 cm mass to neck,   HEENT: Clear effusion to right TM.  Sinuses nontender to palpation  RESP: lungs clear to auscultation - no rales, rhonchi or wheezes  CV: regular rate and rhythm, normal S1 S2, no S3 or S4, no murmur, click or rub, no peripheral edema and peripheral pulses strong  MS: no gross musculoskeletal defects noted, no edema

## 2023-11-15 ENCOUNTER — MYC REFILL (OUTPATIENT)
Dept: FAMILY MEDICINE | Facility: CLINIC | Age: 53
End: 2023-11-15
Payer: COMMERCIAL

## 2023-11-15 DIAGNOSIS — F43.22 ADJUSTMENT DISORDER WITH ANXIETY: ICD-10-CM

## 2023-11-15 DIAGNOSIS — F41.1 GENERALIZED ANXIETY DISORDER: ICD-10-CM

## 2023-11-16 RX ORDER — ESCITALOPRAM OXALATE 10 MG/1
10 TABLET ORAL DAILY
Qty: 90 TABLET | Refills: 3 | OUTPATIENT
Start: 2023-11-16

## 2023-12-07 ENCOUNTER — ANCILLARY PROCEDURE (OUTPATIENT)
Dept: GENERAL RADIOLOGY | Facility: CLINIC | Age: 53
End: 2023-12-07
Attending: NURSE PRACTITIONER
Payer: COMMERCIAL

## 2023-12-07 ENCOUNTER — OFFICE VISIT (OUTPATIENT)
Dept: FAMILY MEDICINE | Facility: CLINIC | Age: 53
End: 2023-12-07
Payer: COMMERCIAL

## 2023-12-07 VITALS
HEIGHT: 66 IN | DIASTOLIC BLOOD PRESSURE: 66 MMHG | SYSTOLIC BLOOD PRESSURE: 100 MMHG | TEMPERATURE: 98.3 F | BODY MASS INDEX: 25.88 KG/M2 | HEART RATE: 89 BPM | WEIGHT: 161 LBS | OXYGEN SATURATION: 98 % | RESPIRATION RATE: 16 BRPM

## 2023-12-07 DIAGNOSIS — R05.3 CHRONIC COUGH: ICD-10-CM

## 2023-12-07 DIAGNOSIS — R13.10 DYSPHAGIA, UNSPECIFIED TYPE: ICD-10-CM

## 2023-12-07 DIAGNOSIS — R13.10 PAIN WITH SWALLOWING: ICD-10-CM

## 2023-12-07 DIAGNOSIS — J06.9 URI WITH COUGH AND CONGESTION: Primary | ICD-10-CM

## 2023-12-07 PROCEDURE — 99214 OFFICE O/P EST MOD 30 MIN: CPT | Performed by: NURSE PRACTITIONER

## 2023-12-07 PROCEDURE — 71046 X-RAY EXAM CHEST 2 VIEWS: CPT | Mod: TC | Performed by: RADIOLOGY

## 2023-12-07 RX ORDER — DOXYCYCLINE 100 MG/1
100 CAPSULE ORAL 2 TIMES DAILY
Qty: 14 CAPSULE | Refills: 0 | Status: SHIPPED | OUTPATIENT
Start: 2023-12-07 | End: 2023-12-14

## 2023-12-07 ASSESSMENT — ENCOUNTER SYMPTOMS
COUGH: 1
HEARTBURN: 1
WHEEZING: 1
FATIGUE: 1

## 2023-12-07 NOTE — PATIENT INSTRUCTIONS
You have a respiratory infection.  Take antibiotics as prescribed for the full course.  Take a probiotic and/or eat yogurt daily while on antibiotics and ~1 week after to prevent GI upset.  Continue to use OTC medications for symptom relief.   Rest and stay hydrated.  Use a humidifier in the bedroom, warm compresses on the face, and steam inhalation.  If symptoms worsen or do not improve within 1 week, please follow-up with your PCP.  Chest x-ray done today. I will notify you with those results.     Pain with swallowing:   EGD ordered. They should call you to schedule.   Start taking Omeprazole as prescribed to see if that helps with symptoms.

## 2023-12-07 NOTE — PROGRESS NOTES
"  Assessment & Plan     URI with cough and congestion  Chronic/recurrent cold/congestion. Abx prescribed. Side effects, risks and benefits of medication were discussed with patient. Discussed how and when to take medication. Recommended taking a probiotic and/or eating yogurt every day while on antibiotics and for ~1 week after stopping antibiotics to prevent GI upset. Discussed OTC recommendations for symptom control. Rest, hydration, humidification.     - doxycycline hyclate (VIBRAMYCIN) 100 MG capsule; Take 1 capsule (100 mg) by mouth 2 times daily for 7 days    Chronic cough  CXR due to chronic nature and her below symptoms of pain with swallowing.     - XR Chest 2 Views  - doxycycline hyclate (VIBRAMYCIN) 100 MG capsule; Take 1 capsule (100 mg) by mouth 2 times daily for 7 days    Dysphagia, unspecified type  EGD referral placed. Omeprazole started in case she has some errosion/ulcers that are causing the issues. If symptoms worsen or do not resolve, advised she seek emergent care.     - Adult GI  Referral - Procedure Only; Future  - omeprazole (PRILOSEC) 20 MG DR capsule; Take 1 capsule (20 mg) by mouth daily    Pain with swallowing  See above.     - Adult GI  Referral - Procedure Only; Future  - omeprazole (PRILOSEC) 20 MG DR capsule; Take 1 capsule (20 mg) by mouth daily    Ordering of each unique test  Prescription drug management         BMI:   Estimated body mass index is 26.38 kg/m  as calculated from the following:    Height as of this encounter: 1.664 m (5' 5.5\").    Weight as of this encounter: 73 kg (161 lb).       Patient Instructions   You have a respiratory infection.  Take antibiotics as prescribed for the full course.  Take a probiotic and/or eat yogurt daily while on antibiotics and ~1 week after to prevent GI upset.  Continue to use OTC medications for symptom relief.   Rest and stay hydrated.  Use a humidifier in the bedroom, warm compresses on the face, and steam " inhalation.  If symptoms worsen or do not improve within 1 week, please follow-up with your PCP.  Chest x-ray done today. I will notify you with those results.     Pain with swallowing:   EGD ordered. They should call you to schedule.   Start taking Omeprazole as prescribed to see if that helps with symptoms.       Doretha Dorantes DNP  [facility] is a 53 year old, presenting for the following health issues:  URI      History of Present Illness       Reason for visit:  Rattling chest and wheezing  Symptom onset:  3-4 weeks ago  Symptoms include:  Rattling chest and wheezing and congestion  Symptom intensity:  Moderate  Symptom progression:  Staying the same  Had these symptoms before:  No    She eats 2-3 servings of fruits and vegetables daily.She consumes 1 sweetened beverage(s) daily.She exercises with enough effort to increase her heart rate 9 or less minutes per day.  She exercises with enough effort to increase her heart rate 3 or less days per week.   She is taking medications regularly.     11/03/23, Head cold. 3-4 weeks ago, after 1 week it became a chest cold.   Cough up lots of yellow phlegm.   Some rattling, taken chest mucinex. Deep barking cough and lots of phlegm.     Occasional discomfort swallowing food, food feels stuck, excruciating pain. Almost call ems. Ongoing a couple of months      Additional provider notes: Patient presents in clinic for chest cold going on for a couple months which has worsened the past few weeks.     Mucinex for a week which helped get stuff up.     Pain with swallowing food: this has been happening 1-2 times per week for a few months. States it happens for a minute as it goes down her esophagus. Pain is so severe she has thought about calling 911. Last episode was last night. Last endoscopy was 01/2023. Sitting here in the clinic, she feels like the chest is sensitive.     Allergies   Allergen Reactions    Adhesive Tape  "Itching    Cats     Dogs     Dust Mites     Nkda [No Known Drug Allergy]     Seasonal Allergies     Trees      Tree mold    Latex      Rash         Current Outpatient Medications   Medication    cetirizine (ZYRTEC) 10 MG tablet    escitalopram (LEXAPRO) 10 MG tablet    fluticasone (FLONASE) 50 MCG/ACT nasal spray    nortriptyline (PAMELOR) 10 MG capsule    pseudoePHEDrine (SUDAFED) 30 MG tablet    SUMAtriptan (IMITREX) 50 MG tablet     No current facility-administered medications for this visit.       Past Medical History:   Diagnosis Date    History of blood transfusion Age 3 years old.    NO ACTIVE PROBLEMS             Review of Systems   Constitutional:  Positive for fatigue.   HENT:  Positive for congestion.    Respiratory:  Positive for cough and wheezing.    Gastrointestinal:  Positive for heartburn (pain with swallowing ~once a week).   All other systems reviewed and are negative.           Objective    /66 (BP Location: Right arm, Patient Position: Chair, Cuff Size: Adult Regular)   Pulse 89   Temp 98.3  F (36.8  C) (Tympanic)   Resp 16   Ht 1.664 m (5' 5.5\")   Wt 73 kg (161 lb)   LMP 01/01/2020 (Exact Date)   SpO2 98%   Breastfeeding No   BMI 26.38 kg/m    Body mass index is 26.38 kg/m .  Physical Exam  Vitals reviewed.   Constitutional:       General: She is not in acute distress.     Appearance: Normal appearance. She is not ill-appearing or toxic-appearing.   HENT:      Right Ear: Ear canal and external ear normal. There is no impacted cerumen. Tympanic membrane is bulging.      Left Ear: Ear canal and external ear normal. There is no impacted cerumen. Tympanic membrane is bulging.      Nose: Nose normal. No congestion.      Mouth/Throat:      Mouth: Mucous membranes are moist.      Pharynx: Oropharynx is clear. No posterior oropharyngeal erythema.   Cardiovascular:      Rate and Rhythm: Normal rate and regular rhythm.      Pulses: Normal pulses.      Heart sounds: Normal heart sounds.     "  Comments: No sternal pain with pressure  Pulmonary:      Effort: Pulmonary effort is normal.      Breath sounds: Normal breath sounds.   Neurological:      Mental Status: She is alert.            Results for orders placed or performed in visit on 12/07/23   XR Chest 2 Views    Impression    IMPRESSION: Heart is normal in size. Lungs are clear.    ELVIS OLIVARES MD         SYSTEM ID:  [id number]

## 2023-12-30 DIAGNOSIS — R09.81 CONGESTION OF PARANASAL SINUS: ICD-10-CM

## 2024-01-02 RX ORDER — FLUTICASONE PROPIONATE 50 MCG
1 SPRAY, SUSPENSION (ML) NASAL DAILY
Qty: 16 G | Refills: 0 | Status: SHIPPED | OUTPATIENT
Start: 2024-01-02 | End: 2024-03-01

## 2024-03-01 DIAGNOSIS — R09.81 CONGESTION OF PARANASAL SINUS: ICD-10-CM

## 2024-03-01 RX ORDER — FLUTICASONE PROPIONATE 50 MCG
1 SPRAY, SUSPENSION (ML) NASAL DAILY
Qty: 16 G | Refills: 0 | Status: SHIPPED | OUTPATIENT
Start: 2024-03-01 | End: 2024-04-30

## 2024-03-03 DIAGNOSIS — R13.10 DYSPHAGIA, UNSPECIFIED TYPE: ICD-10-CM

## 2024-03-03 DIAGNOSIS — R13.10 PAIN WITH SWALLOWING: ICD-10-CM

## 2024-04-30 DIAGNOSIS — R09.81 CONGESTION OF PARANASAL SINUS: ICD-10-CM

## 2024-04-30 RX ORDER — FLUTICASONE PROPIONATE 50 MCG
1 SPRAY, SUSPENSION (ML) NASAL DAILY
Qty: 16 G | Refills: 0 | Status: SHIPPED | OUTPATIENT
Start: 2024-04-30 | End: 2024-06-26

## 2024-05-15 ENCOUNTER — E-VISIT (OUTPATIENT)
Dept: URGENT CARE | Facility: CLINIC | Age: 54
End: 2024-05-15
Payer: COMMERCIAL

## 2024-05-15 DIAGNOSIS — H10.32 ACUTE BACTERIAL CONJUNCTIVITIS OF LEFT EYE: Primary | ICD-10-CM

## 2024-05-15 PROCEDURE — 99421 OL DIG E/M SVC 5-10 MIN: CPT | Performed by: EMERGENCY MEDICINE

## 2024-05-15 RX ORDER — POLYMYXIN B SULFATE AND TRIMETHOPRIM 1; 10000 MG/ML; [USP'U]/ML
SOLUTION OPHTHALMIC
Qty: 10 ML | Refills: 0 | Status: SHIPPED | OUTPATIENT
Start: 2024-05-15 | End: 2024-05-22

## 2024-05-15 NOTE — PATIENT INSTRUCTIONS
Thank you for choosing us for your care. I have placed an order for a prescription so that you can start treatment. View your full visit summary for details by clicking on the link below. Your pharmacist will able to address any questions you may have about the medication.     If you re not feeling better within 2-3 days, please schedule an appointment.  You can schedule an appointment right here in [facility], or call [phone]  If the visit is for the same symptoms as your eVisit, we ll refund the cost of your eVisit if seen within seven days.     Taking Care of Pinkeye at Home (01:30)  Your health professional recommends that you watch this short online health video.  Learn ways to ease the discomfort of pinkeye and keep the infection from spreading.  Purpose:  Describes basic home care for pinkeye to ease discomfort and prevent the spread of the infection.  Goal:  User will learn home treatment for pinkeye.     How to watch the video    Scan the QR code   OR Visit the website    [email or web address]   Current as of: June 5, 2023               Content Version: 14.0    [id number] [employer].   Care instructions adapted under license by your healthcare professional. If you have questions about a medical condition or this instruction, always ask your healthcare professional. [employer] disclaims any warranty or liability for your use of this information.

## 2024-05-30 DIAGNOSIS — R13.10 DYSPHAGIA, UNSPECIFIED TYPE: ICD-10-CM

## 2024-05-30 DIAGNOSIS — R13.10 PAIN WITH SWALLOWING: ICD-10-CM

## 2024-06-07 DIAGNOSIS — R13.10 DYSPHAGIA, UNSPECIFIED TYPE: ICD-10-CM

## 2024-06-07 DIAGNOSIS — R13.10 PAIN WITH SWALLOWING: ICD-10-CM

## 2024-06-26 DIAGNOSIS — R09.81 CONGESTION OF PARANASAL SINUS: ICD-10-CM

## 2024-06-26 RX ORDER — FLUTICASONE PROPIONATE 50 MCG
1 SPRAY, SUSPENSION (ML) NASAL DAILY
Qty: 16 G | Refills: 0 | Status: SHIPPED | OUTPATIENT
Start: 2024-06-26 | End: 2024-09-03

## 2024-08-05 ENCOUNTER — OFFICE VISIT (OUTPATIENT)
Dept: FAMILY MEDICINE | Facility: CLINIC | Age: 54
End: 2024-08-05
Payer: COMMERCIAL

## 2024-08-05 ENCOUNTER — ANCILLARY PROCEDURE (OUTPATIENT)
Dept: GENERAL RADIOLOGY | Facility: CLINIC | Age: 54
End: 2024-08-05
Attending: FAMILY MEDICINE
Payer: COMMERCIAL

## 2024-08-05 VITALS
HEIGHT: 65 IN | RESPIRATION RATE: 16 BRPM | HEART RATE: 79 BPM | WEIGHT: 166.6 LBS | BODY MASS INDEX: 27.76 KG/M2 | SYSTOLIC BLOOD PRESSURE: 122 MMHG | DIASTOLIC BLOOD PRESSURE: 72 MMHG | OXYGEN SATURATION: 97 % | TEMPERATURE: 97.9 F

## 2024-08-05 DIAGNOSIS — Z00.00 HEALTHCARE MAINTENANCE: ICD-10-CM

## 2024-08-05 DIAGNOSIS — E78.5 HYPERLIPIDEMIA LDL GOAL <100: ICD-10-CM

## 2024-08-05 DIAGNOSIS — R05.3 CHRONIC COUGH: ICD-10-CM

## 2024-08-05 DIAGNOSIS — Z11.4 SCREENING FOR HIV (HUMAN IMMUNODEFICIENCY VIRUS): ICD-10-CM

## 2024-08-05 DIAGNOSIS — R05.3 CHRONIC COUGH: Primary | ICD-10-CM

## 2024-08-05 LAB
ALBUMIN SERPL BCG-MCNC: 4.2 G/DL (ref 3.5–5.2)
ALP SERPL-CCNC: 167 U/L (ref 40–150)
ALT SERPL W P-5'-P-CCNC: 19 U/L (ref 0–50)
ANION GAP SERPL CALCULATED.3IONS-SCNC: 10 MMOL/L (ref 7–15)
AST SERPL W P-5'-P-CCNC: 19 U/L (ref 0–45)
BILIRUB SERPL-MCNC: 0.3 MG/DL
BUN SERPL-MCNC: 13.8 MG/DL (ref 6–20)
CALCIUM SERPL-MCNC: 9.5 MG/DL (ref 8.8–10.4)
CHLORIDE SERPL-SCNC: 104 MMOL/L (ref 98–107)
CHOLEST SERPL-MCNC: 235 MG/DL
CREAT SERPL-MCNC: 0.96 MG/DL (ref 0.51–0.95)
EGFRCR SERPLBLD CKD-EPI 2021: 69 ML/MIN/1.73M2
FASTING STATUS PATIENT QL REPORTED: YES
FASTING STATUS PATIENT QL REPORTED: YES
GLUCOSE SERPL-MCNC: 89 MG/DL (ref 70–99)
HBA1C MFR BLD: 5.3 % (ref 0–5.6)
HCO3 SERPL-SCNC: 27 MMOL/L (ref 22–29)
HDLC SERPL-MCNC: 42 MG/DL
HIV 1+2 AB+HIV1 P24 AG SERPL QL IA: NONREACTIVE
INSULIN SERPL-ACNC: 13.5 UU/ML (ref 2.6–24.9)
LDLC SERPL CALC-MCNC: 142 MG/DL
NONHDLC SERPL-MCNC: 193 MG/DL
POTASSIUM SERPL-SCNC: 4.1 MMOL/L (ref 3.4–5.3)
PROT SERPL-MCNC: 6.9 G/DL (ref 6.4–8.3)
SODIUM SERPL-SCNC: 141 MMOL/L (ref 135–145)
TRIGL SERPL-MCNC: 257 MG/DL

## 2024-08-05 PROCEDURE — 80053 COMPREHEN METABOLIC PANEL: CPT | Performed by: FAMILY MEDICINE

## 2024-08-05 PROCEDURE — 99000 SPECIMEN HANDLING OFFICE-LAB: CPT | Performed by: FAMILY MEDICINE

## 2024-08-05 PROCEDURE — 80061 LIPID PANEL: CPT | Performed by: FAMILY MEDICINE

## 2024-08-05 PROCEDURE — 99214 OFFICE O/P EST MOD 30 MIN: CPT | Performed by: FAMILY MEDICINE

## 2024-08-05 PROCEDURE — 82172 ASSAY OF APOLIPOPROTEIN: CPT | Mod: 90 | Performed by: FAMILY MEDICINE

## 2024-08-05 PROCEDURE — 36415 COLL VENOUS BLD VENIPUNCTURE: CPT | Performed by: NURSE PRACTITIONER

## 2024-08-05 PROCEDURE — 87389 HIV-1 AG W/HIV-1&-2 AB AG IA: CPT | Performed by: FAMILY MEDICINE

## 2024-08-05 PROCEDURE — 83525 ASSAY OF INSULIN: CPT | Performed by: NURSE PRACTITIONER

## 2024-08-05 PROCEDURE — 71046 X-RAY EXAM CHEST 2 VIEWS: CPT | Mod: TC | Performed by: RADIOLOGY

## 2024-08-05 PROCEDURE — 83036 HEMOGLOBIN GLYCOSYLATED A1C: CPT | Performed by: FAMILY MEDICINE

## 2024-08-05 RX ORDER — SUMATRIPTAN 100 MG/1
TABLET, FILM COATED ORAL
COMMUNITY
Start: 2024-07-22

## 2024-08-05 RX ORDER — GABAPENTIN 100 MG/1
100 CAPSULE ORAL 3 TIMES DAILY
COMMUNITY
Start: 2024-04-16

## 2024-08-05 RX ORDER — FLUTICASONE PROPIONATE AND SALMETEROL 100; 50 UG/1; UG/1
1 POWDER RESPIRATORY (INHALATION) EVERY 12 HOURS
Qty: 60 EACH | Refills: 1 | Status: SHIPPED | OUTPATIENT
Start: 2024-08-05

## 2024-08-05 ASSESSMENT — ENCOUNTER SYMPTOMS: COUGH: 1

## 2024-08-05 NOTE — PATIENT INSTRUCTIONS
Chest x-ray looks good today.  We discussed beginning an inhaler for symptom management.  Please schedule the lung testing when you are able as well.  We can follow-up over MyChart when the testing results are available to see how things are going with the cough

## 2024-08-05 NOTE — PROGRESS NOTES
A/P:      ICD-10-CM    1. Chronic cough  R05.3 XR Chest 2 Views     General PFT Lab (Please always keep checked)     Pulmonary Function Test     fluticasone-salmeterol (ADVAIR) 100-50 MCG/ACT inhaler      2. Hyperlipidemia LDL goal <100  E78.5 Lipid panel reflex to direct LDL Fasting     Hemoglobin A1c     Apolipoprotein B     Lipid panel reflex to direct LDL Fasting      3. Healthcare maintenance  Z00.00 Comprehensive metabolic panel (BMP + Alb, Alk Phos, ALT, AST, Total. Bili, TP)      4. Screening for HIV (human immunodeficiency virus)  Z11.4 HIV Antigen Antibody Combo        Cough:  ongoing since illness in December.  Negative chest x-ray today.  Plan Advair as ordered for presumed post viral cough.  Reviewed appropriate use.  Plan PFT to eval for underlying lung disease.  Will follow-up on benefit of Advair when PFT results reviewed.      Pt requested to have previously ordered labs drawn, previous orders released.    Demetrio Eller is a 54 year old, presenting for the following health issues:  Cough        8/5/2024     8:32 AM   Additional Questions   Roomed by Lien HINES   Accompanied by Self      History of Present Illness       Reason for visit:  Lingering deep chest cough    She eats 2-3 servings of fruits and vegetables daily.She consumes 1 sweetened beverage(s) daily.She exercises with enough effort to increase her heart rate 9 or less minutes per day.  She exercises with enough effort to increase her heart rate 3 or less days per week.   She is taking medications regularly.       Feels like she has had a intermittent cough since her illness in December.  Feels cough is occasional but sounds very deep and can sometimes feel painful.    Cough does not seem particularly frequent, just sounds deeper.  Cough is essentially nonproductive.    Triggered by laugh, deep breath.  Does have some allergy as well and can be triggered by that.     No personal h/o asthma but runs in the family.    No Sob or GALLEGOS.   "          Review of Systems  Constitutional, HEENT, cardiovascular, pulmonary, gi and gu systems are negative, except as otherwise noted.      Objective    /72   Pulse 79   Temp 97.9  F (36.6  C) (Tympanic)   Resp 16   Ht 1.659 m (5' 5.3\")   Wt 75.6 kg (166 lb 9.6 oz)   LMP 01/01/2020 (Exact Date)   SpO2 97%   BMI 27.47 kg/m    Body mass index is 27.47 kg/m .  Physical Exam   GENERAL: alert and no distress  NECK: no adenopathy, no asymmetry, masses, or scars  RESP: lungs clear to auscultation - no rales, rhonchi or wheezes  CV: regular rate and rhythm, normal S1 S2, no S3 or S4, no murmur, click or rub, no peripheral edema  MS: no gross musculoskeletal defects noted, no edema  NEURO: Normal strength and tone, mentation intact and speech normal  PSYCH: mentation appears normal, affect normal/bright    Xray - Reviewed and interpreted by me.  Negative CXR  Epic reviewed        Signed Electronically by: Emani Cleaning DO    "

## 2024-08-06 LAB — APO B100 SERPL-MCNC: 137 MG/DL

## 2024-08-11 DIAGNOSIS — R74.8 ELEVATED ALKALINE PHOSPHATASE LEVEL: Primary | ICD-10-CM

## 2024-08-11 DIAGNOSIS — E78.5 HYPERLIPIDEMIA LDL GOAL <100: ICD-10-CM

## 2024-08-11 NOTE — PROGRESS NOTES
Elevated lipids, ApoB, LDL-C, triglycerides, non-HDL level recommend that she check this further with a coronary CT scan with calcium score.      Fasting insulin, Starla IR score is showing insulin resistance patient needs further education regarding insulin resistance and lifestyle management.      Alkaline phosphatase was elevated, again needs another test GGT to evaluate for hepatic cause.    Angeli Nixon, APRN, CNP

## 2024-08-12 ENCOUNTER — HOSPITAL ENCOUNTER (OUTPATIENT)
Dept: RESPIRATORY THERAPY | Facility: CLINIC | Age: 54
Discharge: HOME | End: 2024-08-12
Attending: FAMILY MEDICINE | Admitting: FAMILY MEDICINE
Payer: COMMERCIAL

## 2024-08-12 DIAGNOSIS — R05.3 CHRONIC COUGH: ICD-10-CM

## 2024-08-12 PROCEDURE — 94729 DIFFUSING CAPACITY: CPT

## 2024-08-12 PROCEDURE — 94726 PLETHYSMOGRAPHY LUNG VOLUMES: CPT

## 2024-08-12 PROCEDURE — 94375 RESPIRATORY FLOW VOLUME LOOP: CPT

## 2024-08-13 ENCOUNTER — TELEPHONE (OUTPATIENT)
Dept: FAMILY MEDICINE | Facility: CLINIC | Age: 54
End: 2024-08-13
Payer: COMMERCIAL

## 2024-08-13 NOTE — TELEPHONE ENCOUNTER
Patient calling in regards to GGT test and wanting to know if this lab is fasting. Advised patient there are no instructions of fasting. Patient voiced understanding.    Rodriguez HINES RN  [facility]

## 2024-08-14 ENCOUNTER — LAB (OUTPATIENT)
Dept: LAB | Facility: CLINIC | Age: 54
End: 2024-08-14
Payer: COMMERCIAL

## 2024-08-14 DIAGNOSIS — R74.8 ELEVATED ALKALINE PHOSPHATASE LEVEL: ICD-10-CM

## 2024-08-14 PROCEDURE — 82977 ASSAY OF GGT: CPT

## 2024-08-14 PROCEDURE — 36415 COLL VENOUS BLD VENIPUNCTURE: CPT

## 2024-08-15 ENCOUNTER — MYC MEDICAL ADVICE (OUTPATIENT)
Dept: FAMILY MEDICINE | Facility: CLINIC | Age: 54
End: 2024-08-15
Payer: COMMERCIAL

## 2024-08-15 DIAGNOSIS — M81.0 AGE-RELATED OSTEOPOROSIS WITHOUT CURRENT PATHOLOGICAL FRACTURE: Primary | ICD-10-CM

## 2024-08-15 LAB
DLCOUNC-%PRED-PRE: 113 %
DLCOUNC-PRE: 23.55 ML/MIN/MMHG
DLCOUNC-PRED: 20.74 ML/MIN/MMHG
ERV-%PRED-PRE: 53 %
ERV-PRE: 0.63 L
ERV-PRED: 1.18 L
EXPTIME-PRE: 7.21 SEC
FEF2575-%PRED-PRE: 91 %
FEF2575-PRE: 2.18 L/SEC
FEF2575-PRED: 2.38 L/SEC
FEFMAX-%PRED-PRE: 87 %
FEFMAX-PRE: 5.83 L/SEC
FEFMAX-PRED: 6.69 L/SEC
FEV1-%PRED-PRE: 104 %
FEV1-PRE: 2.66 L
FEV1FEV6-PRE: 77 %
FEV1FEV6-PRED: 81 %
FEV1FVC-PRE: 74 %
FEV1FVC-PRED: 80 %
FEV1SVC-PRE: 75 %
FEV1SVC-PRED: 74 %
FIFMAX-PRE: 2.01 L/SEC
FIO2-PRE: 21 %
FRCPLETH-%PRED-PRE: 86 %
FRCPLETH-PRE: 2.39 L
FRCPLETH-PRED: 2.77 L
FVC-%PRED-PRE: 112 %
FVC-PRE: 3.57 L
FVC-PRED: 3.16 L
GGT SERPL-CCNC: 20 U/L (ref 5–36)
IC-%PRED-PRE: 122 %
IC-PRE: 2.89 L
IC-PRED: 2.35 L
RVPLETH-%PRED-PRE: 92 %
RVPLETH-PRE: 1.76 L
RVPLETH-PRED: 1.9 L
TLCPLETH-%PRED-PRE: 102 %
TLCPLETH-PRE: 5.28 L
TLCPLETH-PRED: 5.16 L
VA-%PRED-PRE: 102 %
VA-PRE: 5.09 L
VC-%PRED-PRE: 102 %
VC-PRE: 3.52 L
VC-PRED: 3.43 L

## 2024-08-21 ENCOUNTER — HOSPITAL ENCOUNTER (OUTPATIENT)
Dept: CT IMAGING | Facility: CLINIC | Age: 54
Discharge: HOME | End: 2024-08-21
Attending: NURSE PRACTITIONER | Admitting: NURSE PRACTITIONER
Payer: COMMERCIAL

## 2024-08-21 DIAGNOSIS — E78.5 HYPERLIPIDEMIA LDL GOAL <100: ICD-10-CM

## 2024-08-21 PROCEDURE — 75571 CT HRT W/O DYE W/CA TEST: CPT

## 2024-08-21 PROCEDURE — 75571 CT HRT W/O DYE W/CA TEST: CPT | Mod: 26 | Performed by: STUDENT IN AN ORGANIZED HEALTH CARE EDUCATION/TRAINING PROGRAM

## 2024-08-28 ENCOUNTER — OFFICE VISIT (OUTPATIENT)
Dept: FAMILY MEDICINE | Facility: CLINIC | Age: 54
End: 2024-08-28
Payer: COMMERCIAL

## 2024-08-28 VITALS
WEIGHT: 165 LBS | BODY MASS INDEX: 29.23 KG/M2 | HEIGHT: 63 IN | TEMPERATURE: 98.6 F | OXYGEN SATURATION: 99 % | RESPIRATION RATE: 16 BRPM | HEART RATE: 70 BPM | SYSTOLIC BLOOD PRESSURE: 104 MMHG | DIASTOLIC BLOOD PRESSURE: 70 MMHG

## 2024-08-28 DIAGNOSIS — E78.5 HYPERLIPIDEMIA LDL GOAL <100: ICD-10-CM

## 2024-08-28 DIAGNOSIS — R74.8 ELEVATED ALKALINE PHOSPHATASE LEVEL: Primary | ICD-10-CM

## 2024-08-28 DIAGNOSIS — E88.819 INSULIN RESISTANCE: ICD-10-CM

## 2024-08-28 PROCEDURE — 99214 OFFICE O/P EST MOD 30 MIN: CPT | Performed by: NURSE PRACTITIONER

## 2024-08-28 ASSESSMENT — PAIN SCALES - GENERAL: PAINLEVEL: NO PAIN (0)

## 2024-08-28 NOTE — PROGRESS NOTES
Assessment & Plan   Problem List Items Addressed This Visit       Hyperlipidemia LDL goal <100     Recent Labs   Lab Test 08/05/24  0920 12/07/22  1006   CHOL 235* 260*   HDL 42* 48*   * 154*   TRIG 257* 292*   Non  than 130 is considered high risk, this is a proxy for LDL-P    Apo B: 137 (High)  Apolipoprotein B (Apo B) is a test to evaluate for heart disease causing lipid particles     [facility] reference range for Apo B  Here are the reference ranges:  Males/Females > 18 years of age  Desirable <90  Above desirable: 90-99  Borderline High 100-119  High: 120-130  Very high > or = 140    The 10-year ASCVD risk score (Melissa VORA, et al., 2019) is: 2.2%    Values used to calculate the score:      Age: 54 years      Sex: Female      Is Non- : No      Diabetic: No      Tobacco smoker: No      Systolic Blood Pressure: 104 mmHg      Is BP treated: No      HDL Cholesterol: 42 mg/dL      Total Cholesterol: 235 mg/dL    Coronary CT with calcium scan     IMPRESSIONS:  1.  No coronary calcifications.  2.  The total Agatston calcium score is 0 placing the patient in the  lowest percentile when compared to age and gender matched control  group.  3.  Please review the separate Radiology report for incidental  noncardiac findings.      FINDINGS:     CORONARY ARTERY CALCIUM SCORES:   Total calcium score: 0  Left main coronary artery: 0  Left anterior descending coronary artery: 0  Circumflex coronary artery: 0  Right coronary artery: 0    Family history  Parents passed from COVID during Covid  Grandparents  PGF: diabetes    MDM: Lipids are elevated including triglycerides, non-HDL, LDL levels.  Since the coronary CT calcium scan was 0 I feel we can continue to monitor her lipids even when they are so elevated.  We will continue to monitor this yearly.  Consider rechecking the coronary CT with calcium scan in 1-2 years to make sure that there is no increase in calcium score.  Plan: Start  "a high dose fish oil supplement to help lower triglyceride levels.  If no improvement consider fenofibrate in the future.  Today we did discuss lipid science as well as risks of elevated LDL-P and the risk of coronary artery disease.           Elevated alkaline phosphatase level - Primary     Has had increased alkaline phosphatase over the year level.  GGT was negative or normal.  Does not appear to be a liver cause so at this point we need to evaluate  a bone etiology.  Labs ordered TSH, phosphorus, vitamin D level, ionized calcium, DEXA bone scan has also been ordered.  If these are negative then I would consider a SPEP UPEP evaluating for multiple myeloma.           Relevant Orders    Parathyroid Hormone Intact    DX Bone Density    Vitamin D Deficiency    Phosphorus    TSH with free T4 reflex    Ionized Calcium    Insulin resistance     Homeostatic Model Assessment of Insulin Resistance  Starla-IR: Insulin mU/L 13 x  Glucose mg/dL89  = 2.9  Reference ranges:  Healthy Range: 1.0 (0.5-1.4)  Less than 1.0 means you are insulin-sensitive which is optimal  Above 1.9 indicates early insulin resistance  Above 2.9 indicates significant insulin resistance    TG/HDL ratio: 6.1  Reference Ranges:  Optimal range 0.5-1.9  Some insulin resistance 2.0-3.0  Significant insulin resistance and heart disease risk is found at ratios >3.0    Lab Results   Component Value Date    A1C 5.3 08/05/2024     Labs are showing that she does have early insulin resistance.  However this is not worsened to the point of elevating her glucose a lot.  We discussed lifestyle interventions including low refined carbohydrate high-protein diet, resistance training, zone 2 cardio, intermittent fasting, fish oil supplementation GLP-1 agonist medications.  Continue to monitor this in the future.                    BMI  Estimated body mass index is 28.95 kg/m  as calculated from the following:    Height as of this encounter: 1.608 m (5' 3.3\").    Weight as " "of this encounter: 74.8 kg (165 lb).   Weight management plan: Discussed healthy diet and exercise guidelines    FUTURE APPOINTMENTS:       - Follow-up visit in 3 months for routine physical      Subjective   Daphnie is a 54 year old, presenting for the following health issues:  Results    History of Present Illness       Reason for visit:  Review test results   She is taking medications regularly.     Is here to discuss test results.  She recently got the labs completed that I had ordered from her wellness exam done in October 2023.  She has elevated alkaline phosphatase levels, elevated lipids and slightly elevated insulin showing insulin resistance.  She has made changes in her lifestyle improving her diet by taking out gluten.  It has dramatically improved her health.  She does not want to go back to eating gluten as she felt very poor when she did.  Alkaline phosphatase levels have been elevated, so GGT was checked which was negative.         Review of Systems  Constitutional, HEENT, cardiovascular, pulmonary, gi and gu systems are negative, except as otherwise noted.      Objective    /70 (BP Location: Right arm, Patient Position: Sitting, Cuff Size: Adult Regular)   Pulse 70   Temp 98.6  F (37  C) (Tympanic)   Resp 16   Ht 1.608 m (5' 3.3\")   Wt 74.8 kg (165 lb)   LMP 01/01/2020 (Exact Date)   SpO2 99%   BMI 28.95 kg/m    Body mass index is 28.95 kg/m .  Physical Exam  Constitutional:       Appearance: Normal appearance.   HENT:      Head: Normocephalic.   Pulmonary:      Effort: Pulmonary effort is normal. No respiratory distress.   Skin:     General: Skin is warm and dry.   Neurological:      Mental Status: She is alert and oriented to person, place, and time.   Psychiatric:         Mood and Affect: Mood normal.         Behavior: Behavior normal.         Thought Content: Thought content normal.         Judgment: Judgment normal.            Signed Electronically by: CRISSY Sotomayor " CNP

## 2024-08-28 NOTE — PATIENT INSTRUCTIONS
Please start an Omega 3 fatty acid fish oil supplement 5099-8989 mg a day, take with food and  keep the supplement in the fridge. [employer]'s and [employer] are two quality brands of fish oil.   Eat foods high in omega 3 fatty acids sardines, salmon, mackerel, herring, flax,avocados, martha seeds, nuts and seeds. Avoid or reduce consumption of foods with omega 6 fatty acids like soybean oil.       --Eat real foods that are high in natural fats (meats, Ghee, avocado oil, extra virgin olive oil coconut oil, butter, natural nut butters, nuts, avocados, full fat dairy). Avoid canola, vegetable, corn, soybean, and seed oils.   --Eat real foods that are fermented (Full-fat yogurt, Michaela kraut, kombucha, kimichi, pickles)  --Eat real foods that  are high in fiber (fruits, vegetables). (Berries and green leafy veggies)  --Avoid or eliminate refined processed carbohydrates (foods with added sugar, ice cream, deserts, chips, crackers, bread, tortillas, bagels, pasta, junk food, and fast food).   50-70 grams of carbs/day is a place to start.   --Avoid or eliminate sugar sweetened beverages (pop, sports drinks, sweetened tea/coffee, juice)  --Avoid foods that contain high fructose corn syrup.  --Find low carb substitutes for bread, pasta, deserts (zoodles, fathead pizza, spaghetti squash, Wonder/Miracle noodles, shirataki noodles, low carb bread bread, cauliflower rice).  --Consider having a fasting period of 12-16 hours every day.  --Walk 30 minutes daily.  --Sleep 7-9 hours of uninterrupted sleep every night.  --Decrease chronic stress, or increase stress reducing activities.

## 2024-08-29 PROBLEM — R74.8 ELEVATED ALKALINE PHOSPHATASE LEVEL: Status: ACTIVE | Noted: 2024-08-29

## 2024-08-29 PROBLEM — E88.819 INSULIN RESISTANCE: Status: ACTIVE | Noted: 2024-08-29

## 2024-08-29 NOTE — ASSESSMENT & PLAN NOTE
Recent Labs   Lab Test 08/05/24  0920 12/07/22  1006   CHOL 235* 260*   HDL 42* 48*   * 154*   TRIG 257* 292*   Non  than 130 is considered high risk, this is a proxy for LDL-P    Apo B: 137 (High)  Apolipoprotein B (Apo B) is a test to evaluate for heart disease causing lipid particles     [facility] reference range for Apo B  Here are the reference ranges:  Males/Females > 18 years of age  Desirable <90  Above desirable: 90-99  Borderline High 100-119  High: 120-130  Very high > or = 140    The 10-year ASCVD risk score (Melissa VORA, et al., 2019) is: 2.2%    Values used to calculate the score:      Age: 54 years      Sex: Female      Is Non- : No      Diabetic: No      Tobacco smoker: No      Systolic Blood Pressure: 104 mmHg      Is BP treated: No      HDL Cholesterol: 42 mg/dL      Total Cholesterol: 235 mg/dL    Coronary CT with calcium scan     IMPRESSIONS:  1.  No coronary calcifications.  2.  The total Agatston calcium score is 0 placing the patient in the  lowest percentile when compared to age and gender matched control  group.  3.  Please review the separate Radiology report for incidental  noncardiac findings.      FINDINGS:     CORONARY ARTERY CALCIUM SCORES:   Total calcium score: 0  Left main coronary artery: 0  Left anterior descending coronary artery: 0  Circumflex coronary artery: 0  Right coronary artery: 0    Family history  Parents passed from COVID during Covid  Grandparents  PGF: diabetes    MDM: Lipids are elevated including triglycerides, non-HDL, LDL levels.  Since the coronary CT calcium scan was 0 I feel we can continue to monitor her lipids even when they are so elevated.  We will continue to monitor this yearly.  Consider rechecking the coronary CT with calcium scan in 1-2 years to make sure that there is no increase in calcium score.  Plan: Start a high dose fish oil supplement to help lower triglyceride levels.  If no improvement consider  fenofibrate in the future.  Today we did discuss lipid science as well as risks of elevated LDL-P and the risk of coronary artery disease.

## 2024-08-29 NOTE — ASSESSMENT & PLAN NOTE
Has had increased alkaline phosphatase over the year level.  GGT was negative or normal.  Does not appear to be a liver cause so at this point we need to evaluate  a bone etiology.  Labs ordered TSH, phosphorus, vitamin D level, ionized calcium, DEXA bone scan has also been ordered.  If these are negative then I would consider a SPEP UPEP evaluating for multiple myeloma.

## 2024-08-29 NOTE — ASSESSMENT & PLAN NOTE
Homeostatic Model Assessment of Insulin Resistance  Starla-IR: Insulin mU/L 13 x  Glucose mg/dL89  = 2.9  Reference ranges:  Healthy Range: 1.0 (0.5-1.4)  Less than 1.0 means you are insulin-sensitive which is optimal  Above 1.9 indicates early insulin resistance  Above 2.9 indicates significant insulin resistance    TG/HDL ratio: 6.1  Reference Ranges:  Optimal range 0.5-1.9  Some insulin resistance 2.0-3.0  Significant insulin resistance and heart disease risk is found at ratios >3.0    Lab Results   Component Value Date    A1C 5.3 08/05/2024     Labs are showing that she does have early insulin resistance.  However this is not worsened to the point of elevating her glucose a lot.  We discussed lifestyle interventions including low refined carbohydrate high-protein diet, resistance training, zone 2 cardio, intermittent fasting, fish oil supplementation GLP-1 agonist medications.  Continue to monitor this in the future.

## 2024-08-31 DIAGNOSIS — R09.81 CONGESTION OF PARANASAL SINUS: ICD-10-CM

## 2024-09-03 RX ORDER — FLUTICASONE PROPIONATE 50 MCG
1 SPRAY, SUSPENSION (ML) NASAL DAILY
Qty: 16 G | Refills: 0 | Status: SHIPPED | OUTPATIENT
Start: 2024-09-03

## 2024-09-04 ENCOUNTER — HOSPITAL ENCOUNTER (OUTPATIENT)
Dept: BONE DENSITY | Facility: CLINIC | Age: 54
Discharge: HOME | End: 2024-09-04
Attending: NURSE PRACTITIONER | Admitting: NURSE PRACTITIONER
Payer: COMMERCIAL

## 2024-09-04 DIAGNOSIS — R74.8 ELEVATED ALKALINE PHOSPHATASE LEVEL: ICD-10-CM

## 2024-09-04 PROCEDURE — 77080 DXA BONE DENSITY AXIAL: CPT

## 2024-09-05 ENCOUNTER — MYC MEDICAL ADVICE (OUTPATIENT)
Dept: FAMILY MEDICINE | Facility: CLINIC | Age: 54
End: 2024-09-05
Payer: COMMERCIAL

## 2024-09-06 ENCOUNTER — LAB (OUTPATIENT)
Dept: LAB | Facility: CLINIC | Age: 54
End: 2024-09-06
Payer: COMMERCIAL

## 2024-09-06 DIAGNOSIS — R74.8 ELEVATED ALKALINE PHOSPHATASE LEVEL: ICD-10-CM

## 2024-09-06 LAB
CA-I BLD-MCNC: 4.8 MG/DL (ref 4.4–5.2)
PHOSPHATE SERPL-MCNC: 4.4 MG/DL (ref 2.5–4.5)
PTH-INTACT SERPL-MCNC: 48 PG/ML (ref 15–65)
TSH SERPL DL<=0.005 MIU/L-ACNC: 1.65 UIU/ML (ref 0.3–4.2)

## 2024-09-06 PROCEDURE — 82306 VITAMIN D 25 HYDROXY: CPT | Performed by: NURSE PRACTITIONER

## 2024-09-06 PROCEDURE — 84443 ASSAY THYROID STIM HORMONE: CPT | Performed by: NURSE PRACTITIONER

## 2024-09-06 PROCEDURE — 82330 ASSAY OF CALCIUM: CPT

## 2024-09-06 PROCEDURE — 83970 ASSAY OF PARATHORMONE: CPT

## 2024-09-06 PROCEDURE — 84100 ASSAY OF PHOSPHORUS: CPT

## 2024-09-06 PROCEDURE — 36415 COLL VENOUS BLD VENIPUNCTURE: CPT

## 2024-09-07 LAB — VIT D+METAB SERPL-MCNC: 25 NG/ML (ref 20–50)

## 2024-09-09 ENCOUNTER — MYC MEDICAL ADVICE (OUTPATIENT)
Dept: FAMILY MEDICINE | Facility: CLINIC | Age: 54
End: 2024-09-09
Payer: COMMERCIAL

## 2024-09-09 DIAGNOSIS — R74.8 ELEVATED ALKALINE PHOSPHATASE LEVEL: Primary | ICD-10-CM

## 2024-09-09 PROBLEM — M81.0 AGE-RELATED OSTEOPOROSIS WITHOUT CURRENT PATHOLOGICAL FRACTURE: Status: ACTIVE | Noted: 2024-09-09

## 2024-09-09 RX ORDER — ALENDRONATE SODIUM 70 MG/1
70 TABLET ORAL
Qty: 12 TABLET | Refills: 3 | Status: SHIPPED | OUTPATIENT
Start: 2024-09-09

## 2024-09-09 NOTE — ASSESSMENT & PLAN NOTE
Dexa scan shows osteoporosis in the lumbar spine, recommended medication for low bone density.  Medication: Fosamax 70 mg once weekly has been sent to the pharmacy.    Alkaline phosphatase was elevated however ionized calcium, parathyroid intact hormone, phosphorus, vitamin D were all normal.

## 2024-09-09 NOTE — PROGRESS NOTES
Elevated alkaline Phosphatase, added on bone specific alkaline phosphatase lab to blood already drawn.     Angeli Nixon, APRN, CNP

## 2024-09-09 NOTE — TELEPHONE ENCOUNTER
Age-related osteoporosis without current pathological fracture  Dexa scan shows osteoporosis in the lumbar spine, recommended medication for low bone density.  Medication: Fosamax 70 mg once weekly has been sent to the pharmacy.    Alkaline phosphatase was elevated however ionized calcium, parathyroid intact hormone, phosphorus, vitamin D were all normal.      Unclear if Osteoporosis is the cause of the elevated Alkaline Phosphatase. I have ruled out  hepatic cause. TSH normal, phosphorous normal, vitamin D normal, ionized calcium and parathyroid hormone intact normal. Ruling out Hyperparathyroid,   Will start treatment then consider rechecking Alkaline phos.

## 2024-09-16 ENCOUNTER — PATIENT OUTREACH (OUTPATIENT)
Dept: CARE COORDINATION | Facility: CLINIC | Age: 54
End: 2024-09-16
Payer: COMMERCIAL

## 2024-10-17 ENCOUNTER — ANCILLARY PROCEDURE (OUTPATIENT)
Dept: MAMMOGRAPHY | Facility: HOSPITAL | Age: 54
End: 2024-10-17
Attending: NURSE PRACTITIONER
Payer: COMMERCIAL

## 2024-10-17 DIAGNOSIS — Z12.31 VISIT FOR SCREENING MAMMOGRAM: ICD-10-CM

## 2024-10-17 PROCEDURE — 77063 BREAST TOMOSYNTHESIS BI: CPT

## 2024-10-24 ENCOUNTER — MYC REFILL (OUTPATIENT)
Dept: FAMILY MEDICINE | Facility: CLINIC | Age: 54
End: 2024-10-24
Payer: COMMERCIAL

## 2024-10-24 DIAGNOSIS — R05.3 CHRONIC COUGH: ICD-10-CM

## 2024-10-29 RX ORDER — FLUTICASONE PROPIONATE AND SALMETEROL 100; 50 UG/1; UG/1
1 POWDER RESPIRATORY (INHALATION) EVERY 12 HOURS
Qty: 60 EACH | Refills: 0 | Status: SHIPPED | OUTPATIENT
Start: 2024-10-29

## 2024-11-01 ENCOUNTER — OFFICE VISIT (OUTPATIENT)
Dept: FAMILY MEDICINE | Facility: CLINIC | Age: 54
End: 2024-11-01
Payer: COMMERCIAL

## 2024-11-01 ENCOUNTER — ANCILLARY PROCEDURE (OUTPATIENT)
Dept: GENERAL RADIOLOGY | Facility: CLINIC | Age: 54
End: 2024-11-01
Attending: STUDENT IN AN ORGANIZED HEALTH CARE EDUCATION/TRAINING PROGRAM
Payer: COMMERCIAL

## 2024-11-01 VITALS
BODY MASS INDEX: 29.59 KG/M2 | DIASTOLIC BLOOD PRESSURE: 66 MMHG | HEART RATE: 92 BPM | SYSTOLIC BLOOD PRESSURE: 110 MMHG | RESPIRATION RATE: 20 BRPM | HEIGHT: 63 IN | OXYGEN SATURATION: 97 % | TEMPERATURE: 97.8 F | WEIGHT: 167 LBS

## 2024-11-01 DIAGNOSIS — M79.672 LEFT FOOT PAIN: ICD-10-CM

## 2024-11-01 DIAGNOSIS — M79.672 LEFT FOOT PAIN: Primary | ICD-10-CM

## 2024-11-01 PROBLEM — R19.5 ABNORMAL FECES: Status: RESOLVED | Noted: 2021-12-14 | Resolved: 2024-11-01

## 2024-11-01 PROBLEM — R14.0 ABDOMINAL DISTENSION, GASEOUS: Status: RESOLVED | Noted: 2022-12-08 | Resolved: 2024-11-01

## 2024-11-01 PROBLEM — R10.11 RIGHT UPPER QUADRANT PAIN: Status: RESOLVED | Noted: 2022-12-20 | Resolved: 2024-11-01

## 2024-11-01 PROBLEM — R19.5 CHANGE IN STOOL CALIBER: Status: RESOLVED | Noted: 2022-12-07 | Resolved: 2024-11-01

## 2024-11-01 PROBLEM — H69.91 DYSFUNCTION OF RIGHT EUSTACHIAN TUBE: Status: RESOLVED | Noted: 2023-10-16 | Resolved: 2024-11-01

## 2024-11-01 PROBLEM — R14.0 ABDOMINAL BLOATING: Status: RESOLVED | Noted: 2022-12-20 | Resolved: 2024-11-01

## 2024-11-01 PROCEDURE — 99214 OFFICE O/P EST MOD 30 MIN: CPT | Performed by: STUDENT IN AN ORGANIZED HEALTH CARE EDUCATION/TRAINING PROGRAM

## 2024-11-01 PROCEDURE — 73630 X-RAY EXAM OF FOOT: CPT | Mod: TC | Performed by: STUDENT IN AN ORGANIZED HEALTH CARE EDUCATION/TRAINING PROGRAM

## 2024-11-01 NOTE — PATIENT INSTRUCTIONS
Manuel Eller,    Thank you for allowing [facility] to manage your care.      I ordered some xrays, please go to our radiology department to get your xrays.    I sent your prescriptions to your pharmacy.        For your convenience, test results are released as soon as they are available  Please allow 1-2 business days for me to send you a comment about your results.  If not done so, I encourage you to login into [employer] [email or web address] to review your results in real time.     If you have any questions or concerns, please feel free to call us at [phone].    Sincerely,    Dr. Tobin    Did you know?      You can schedule a video visit for follow-up appointments as well as future appointments for certain conditions.  Please see the below link.     [email or web address]    If you have not already done so,  I encourage you to sign up for [employer] ([email or web address]  This will allow you to review your results, securely communicate with a provider, and schedule virtual visits as well.

## 2024-11-01 NOTE — PROGRESS NOTES
"  Assessment & Plan     Left foot pain  Plantar fascitis?Spur? enthesopathy?Etiology is uncertain at this time.Recommend RICE, tylenol as needed, foot soaking in Epsom salt and warm water, topical ointment.  -Low impact activity  -Add heel lift temporarily  - REVIEW OF HEALTH MAINTENANCE PROTOCOL ORDERS  - XR Foot Left G/E 3 Views; Future  - diclofenac (VOLTAREN) 50 MG EC tablet; Take 1 tablet (50 mg) by mouth 2 times daily. With food                  Subjective   Daphnie is a 54 year old, presenting for the following health issues:  Left Foot Pain      11/1/2024     1:49 PM   Additional Questions   Roomed by Cammie LAU         11/1/2024     1:49 PM   Patient Reported Additional Medications   Patient reports taking the following new medications No new medications to add     History of Present Illness       Reason for visit:  Left Foot pain  Symptom onset:  1-2 weeks ago  Symptoms include:  Pain in archbof left foot  Symptom intensity:  Moderate  Symptom progression:  Worsening  Had these symptoms before:  No  What makes it worse:  Walking  What makes it better:  No pressure on foot She is missing 1 dose(s) of medications per week.  She is not taking prescribed medications regularly due to remembering to take.             Review of Systems  Constitutional, HEENT, cardiovascular, pulmonary, gi and gu systems are negative, except as otherwise noted.      Objective    /66   Pulse 92   Temp 97.8  F (36.6  C) (Oral)   Resp 20   Ht 1.608 m (5' 3.3\")   Wt 75.8 kg (167 lb)   LMP 01/01/2020 (Exact Date)   SpO2 97%   BMI 29.30 kg/m    Body mass index is 29.3 kg/m .  Physical Exam   GENERAL: alert and no distress  MS: no gross musculoskeletal defects noted, no edema, sensation and strength are normal, the medial aspect of the plantar of the left foot Is tender to palpation.            Signed Electronically by: Kenisha Tobin MD    "

## 2024-11-03 DIAGNOSIS — M79.672 LEFT FOOT PAIN: ICD-10-CM

## 2024-11-07 ENCOUNTER — HOSPITAL ENCOUNTER (EMERGENCY)
Facility: CLINIC | Age: 54
Discharge: HOME | End: 2024-11-07
Attending: PHYSICIAN ASSISTANT | Admitting: PHYSICIAN ASSISTANT
Payer: COMMERCIAL

## 2024-11-07 VITALS
TEMPERATURE: 98 F | HEART RATE: 82 BPM | SYSTOLIC BLOOD PRESSURE: 134 MMHG | OXYGEN SATURATION: 97 % | RESPIRATION RATE: 16 BRPM | DIASTOLIC BLOOD PRESSURE: 94 MMHG

## 2024-11-07 DIAGNOSIS — H11.32 SUBCONJUNCTIVAL HEMORRHAGE OF LEFT EYE: ICD-10-CM

## 2024-11-07 DIAGNOSIS — J01.90 ACUTE SINUSITIS WITH SYMPTOMS > 10 DAYS: ICD-10-CM

## 2024-11-07 PROCEDURE — G0463 HOSPITAL OUTPT CLINIC VISIT: HCPCS | Performed by: PHYSICIAN ASSISTANT

## 2024-11-07 PROCEDURE — 99213 OFFICE O/P EST LOW 20 MIN: CPT | Performed by: PHYSICIAN ASSISTANT

## 2024-11-07 ASSESSMENT — COLUMBIA-SUICIDE SEVERITY RATING SCALE - C-SSRS
1. IN THE PAST MONTH, HAVE YOU WISHED YOU WERE DEAD OR WISHED YOU COULD GO TO SLEEP AND NOT WAKE UP?: NO
2. HAVE YOU ACTUALLY HAD ANY THOUGHTS OF KILLING YOURSELF IN THE PAST MONTH?: NO
6. HAVE YOU EVER DONE ANYTHING, STARTED TO DO ANYTHING, OR PREPARED TO DO ANYTHING TO END YOUR LIFE?: NO

## 2024-11-07 ASSESSMENT — ENCOUNTER SYMPTOMS
FEVER: 0
SINUS PAIN: 1
SINUS PRESSURE: 1
CONSTITUTIONAL NEGATIVE: 1

## 2024-11-07 NOTE — ED PROVIDER NOTES
History     Chief Complaint   Patient presents with    Sinusitis     Patient c/o pressure and sinus pain over 1 week      HPI  Daphnie Yung is a 54 year old female who presents to Urgent Care with complaints of persistent sinus and nasal congestion, sinus pressure, postnasal drainage, and facial pain for over a week.  Patient reports a history of sinus infections and states these symptoms feel similar.  She became worried today when she noticed a subconjunctival hemorrhage to the left eye.  Denies associated pain.  No preceding injury or trauma to the eye.  No drainage or mattering from the eye.  Patient does not take blood thinners.  Denies fevers, chills, cough, nausea, vomiting, diarrhea, abdominal pain, chest pain, or shortness of breath.      Allergies:  Allergies   Allergen Reactions    Adhesive Tape Itching    Cats     Dogs     Dust Mites     Nkda [No Known Drug Allergy]     Seasonal Allergies     Trees      Tree mold    Latex      Rash         Problem List:    Patient Active Problem List    Diagnosis Date Noted    Age-related osteoporosis without current pathological fracture 09/09/2024     Priority: Medium    Elevated alkaline phosphatase level 08/29/2024     Priority: Medium    Insulin resistance 08/29/2024     Priority: Medium    Healthcare maintenance 10/16/2023     Priority: Medium    Generalized anxiety disorder 10/16/2023     Priority: Medium    Hyperlipidemia LDL goal <100 10/16/2023     Priority: Medium    Seasonal allergic rhinitis due to pollen 05/30/2023     Priority: Medium    Irregular bowel habits 12/20/2022     Priority: Medium    Digestive symptom 12/08/2022     Priority: Medium    Rectal hemorrhage 12/07/2022     Priority: Medium    Hemorrhage of rectum and anus 12/14/2021     Priority: Medium    Other insomnia 09/26/2019     Priority: Medium    Adjustment disorder with anxiety 01/10/2019     Priority: Medium    Migraine with aura and without status migrainosus, not intractable 08/12/2016      Priority: Medium    Anal skin tag 03/10/2016     Priority: Medium    History of irritable bowel syndrome 03/10/2016     Priority: Medium    Internal hemorrhoids 03/10/2016     Priority: Medium    Irritable bowel syndrome 04/10/2015     Priority: Medium    Diverticular disease of colon 05/22/2013     Priority: Medium    Chronic rhinitis 04/11/2006     Priority: Medium     CT in past negative.  No chronic sinusitis.  Tried nasal inhaler for a couple months w/o benefit. Pse doesn't help drainage.  April 11, 2006 - Trial of nasal saline spray, Atrovent.  If ineffective, Allegra-D, Claritin-D.          Past Medical History:    Past Medical History:   Diagnosis Date    History of blood transfusion Age 3 years old.    NO ACTIVE PROBLEMS        Past Surgical History:    Past Surgical History:   Procedure Laterality Date    BIOPSY  5+yrs ago    breast tissue and mouth tissue and cervical tissue - all nor    COLONOSCOPY  5/22/2013    normal    TONSILLECTOMY & ADENOIDECTOMY         Family History:    Family History   Problem Relation Age of Onset    Diabetes Maternal Grandfather     Thyroid Disease Paternal Grandmother     Eye Disorder Paternal Grandmother         cadiract    Diabetes Paternal Grandfather     Anxiety Disorder Daughter     Asthma Daughter     Hypertension No family hx of     Cerebrovascular Disease No family hx of     Breast Cancer No family hx of     Cancer - colorectal No family hx of     Prostate Cancer No family hx of     Glaucoma No family hx of     Macular Degeneration No family hx of        Social History:  Marital Status:   [2]  Social History     Tobacco Use    Smoking status: Never    Smokeless tobacco: Never   Vaping Use    Vaping status: Never Used   Substance Use Topics    Alcohol use: No     Comment: rarly on weekends    Drug use: No        Medications:    amoxicillin-clavulanate (AUGMENTIN) 875-125 MG tablet  alendronate (FOSAMAX) 70 MG tablet  aspirin-acetaminophen-caffeine (EXCEDRIN  MIGRAINE) 250-250-65 MG tablet  cetirizine (ZYRTEC) 10 MG tablet  diclofenac (VOLTAREN) 50 MG EC tablet  escitalopram (LEXAPRO) 10 MG tablet  fluticasone-salmeterol (ADVAIR) 100-50 MCG/ACT inhaler  gabapentin (NEURONTIN) 100 MG capsule  omeprazole (PRILOSEC) 20 MG DR capsule  SUMAtriptan (IMITREX) 100 MG tablet          Review of Systems   Constitutional: Negative.  Negative for fever.   HENT:  Positive for congestion, postnasal drip, sinus pressure and sinus pain.    All other systems reviewed and are negative.      Physical Exam   BP: (!) 134/94  Pulse: 82  Temp: 98  F (36.7  C)  Resp: 16  SpO2: 97 %      Physical Exam  Constitutional:       General: She is not in acute distress.     Appearance: Normal appearance. She is well-developed. She is not ill-appearing, toxic-appearing or diaphoretic.   HENT:      Head: Normocephalic and atraumatic.      Right Ear: Tympanic membrane, ear canal and external ear normal.      Left Ear: Tympanic membrane, ear canal and external ear normal.      Nose: Mucosal edema and congestion present. No rhinorrhea.      Mouth/Throat:      Lips: Pink.      Mouth: Mucous membranes are moist.      Pharynx: Uvula midline. Posterior oropharyngeal erythema and postnasal drip present. No pharyngeal swelling, oropharyngeal exudate or uvula swelling.      Tonsils: No tonsillar exudate or tonsillar abscesses.   Eyes:      General:         Right eye: No foreign body, discharge or hordeolum.         Left eye: No foreign body, discharge or hordeolum.      Extraocular Movements: Extraocular movements intact.      Conjunctiva/sclera:      Right eye: Right conjunctiva is not injected. No chemosis, exudate or hemorrhage.     Left eye: Left conjunctiva is not injected. Hemorrhage present. No chemosis or exudate.     Pupils: Pupils are equal, round, and reactive to light.      Comments: Subconjunctival hemorrhage noted to nasal aspect of left eye   Cardiovascular:      Rate and Rhythm: Normal rate and  regular rhythm.      Heart sounds: Normal heart sounds.   Pulmonary:      Effort: Pulmonary effort is normal. No respiratory distress.      Breath sounds: Normal breath sounds. No wheezing or rales.   Musculoskeletal:      Cervical back: Full passive range of motion without pain, normal range of motion and neck supple. No rigidity. Normal range of motion.   Lymphadenopathy:      Cervical: No cervical adenopathy.   Skin:     General: Skin is warm and dry.   Neurological:      Mental Status: She is alert and oriented to person, place, and time.   Psychiatric:         Behavior: Behavior is cooperative.         ED Course        Procedures    No results found for this or any previous visit (from the past 24 hours).    Medications - No data to display    Assessments & Plan (with Medical Decision Making)     Pt is a 54 year old female who presents to Urgent Care with complaints of persistent sinus and nasal congestion, sinus pressure, postnasal drainage, and facial pain for over a week.  Patient reports a history of sinus infections and states these symptoms feel similar.  She became worried today when she noticed a subconjunctival hemorrhage to the left eye.  Denies associated pain.  No preceding injury or trauma to the eye.  No drainage or mattering from the eye.  Patient does not take blood thinners.      Pt is afebrile on arrival.  Exam as above.  Given prolonged sinus symptoms, will start oral antibiotics.  Encouraged additional symptomatic treatments at home.  Return precautions were reviewed.  Hand-outs were provided.    Patient was sent with Augmentin and was instructed to follow-up with PCP for continued care and management.  She is to return to the ED for persistent and/or worsening symptoms.  Patient expressed understanding of the diagnosis and plan and was discharged home in good condition.    I have reviewed the nursing notes.    I have reviewed the findings, diagnosis, plan and need for follow up with the  patient.    New Prescriptions    AMOXICILLIN-CLAVULANATE (AUGMENTIN) 875-125 MG TABLET    Take 1 tablet by mouth 2 times daily for 7 days.       Final diagnoses:   Acute sinusitis with symptoms > 10 days   Subconjunctival hemorrhage of left eye       11/7/2024   [facility] EMERGENCY DEPT      Disclaimer:  This note consists of symbols derived from keyboarding, dictation and/or voice recognition software.  As a result, there may be errors in the script that have gone undetected.  Please consider this when interpreting information found in this chart.     Mary Ann Calderon PA-C  11/07/24 [id number]

## 2024-11-15 DIAGNOSIS — F41.1 GENERALIZED ANXIETY DISORDER: ICD-10-CM

## 2024-11-15 DIAGNOSIS — F43.22 ADJUSTMENT DISORDER WITH ANXIETY: ICD-10-CM

## 2024-11-15 RX ORDER — ESCITALOPRAM OXALATE 10 MG/1
10 TABLET ORAL DAILY
Qty: 90 TABLET | Refills: 3 | Status: SHIPPED | OUTPATIENT
Start: 2024-11-15

## 2024-11-15 NOTE — TELEPHONE ENCOUNTER
Requested Prescriptions   Pending Prescriptions Disp Refills    escitalopram (LEXAPRO) 10 MG tablet [Pharmacy Med Name: ESCITALOPRAM 10MG TABLETS] 90 tablet 3     Sig: TAKE 1 TABLET(10 MG) BY MOUTH DAILY       SSRIs Protocol Failed - 11/15/2024  9:46 AM        Failed - MERY-7 score of less than 5 in past 6 months.     Please review last MERY-7 score.           Passed - Medication is active on med list        Passed - Recent (12 mo) or future (90 days) visit within the authorizing provider's specialty     The patient must have completed an in-person or virtual visit within the past 12 months or has a future visit scheduled within the next 90 days with the authorizing provider s specialty.  Urgent care and e-visits do not quality as an office visit for this protocol.          Passed - Medication indicated for associated diagnosis     Medication is associated with one or more of the following diagnoses:              Anxiety             Bipolar  Depression  Obsessive-compulsive disorder             Panic disorder  Postmenopausal flushing             Premenstrual dysphoric disorder             Social phobia   Adjustment disorder with depressed mood   Mood disorder   Adjustment disorder with anxious mood          Passed - Patient is age 18 or older        Passed - No active pregnancy on record        Passed - No positive pregnancy test in last 12 months

## 2024-12-17 ENCOUNTER — TELEPHONE (OUTPATIENT)
Dept: FAMILY MEDICINE | Facility: CLINIC | Age: 54
End: 2024-12-17
Payer: COMMERCIAL

## 2024-12-17 ENCOUNTER — LAB (OUTPATIENT)
Dept: LAB | Facility: CLINIC | Age: 54
End: 2024-12-17
Attending: NURSE PRACTITIONER
Payer: COMMERCIAL

## 2024-12-17 ENCOUNTER — E-VISIT (OUTPATIENT)
Dept: FAMILY MEDICINE | Facility: CLINIC | Age: 54
End: 2024-12-17
Payer: COMMERCIAL

## 2024-12-17 DIAGNOSIS — J11.1 INFLUENZA-LIKE ILLNESS: Primary | ICD-10-CM

## 2024-12-17 DIAGNOSIS — J11.1 INFLUENZA-LIKE ILLNESS: ICD-10-CM

## 2024-12-17 LAB
DEPRECATED S PYO AG THROAT QL EIA: NEGATIVE
FLUAV RNA SPEC QL NAA+PROBE: POSITIVE
FLUBV RNA RESP QL NAA+PROBE: NEGATIVE
RSV RNA SPEC NAA+PROBE: NEGATIVE
S PYO DNA THROAT QL NAA+PROBE: NOT DETECTED
SARS-COV-2 RNA RESP QL NAA+PROBE: NEGATIVE

## 2024-12-17 PROCEDURE — 87637 SARSCOV2&INF A&B&RSV AMP PRB: CPT

## 2024-12-17 RX ORDER — BENZONATATE 200 MG/1
200 CAPSULE ORAL 3 TIMES DAILY PRN
Qty: 30 CAPSULE | Refills: 0 | Status: SHIPPED | OUTPATIENT
Start: 2024-12-17

## 2024-12-17 NOTE — TELEPHONE ENCOUNTER
"Patient calling requesting influenza test.    Patient states worker tested positive and has the \"typical\" symptoms.     Advised patient to schedule e-visit. Patient voiced understanding.    Rodriguez HINES RN  [facility]    "

## 2024-12-17 NOTE — PATIENT INSTRUCTIONS
Thank you for choosing us for your care. I have placed an order for a prescription so that you can start treatment. View your full visit summary for details by clicking on the link below. Your pharmacist will able to address any questions you may have about the medication.     If you're not feeling better within 5-7 days, please schedule an appointment.  You can schedule an appointment right here in [facility], or call [phone]  If the visit is for the same symptoms as your eVisit, we'll refund the cost of your eVisit if seen within seven days.    Daphnie,    Thank you for choosing us for your care. I have placed an order for a lab test(s). View your full visit summary for details by clicking on the link below. You can schedule a lab only appointment right here in [facility], or by calling [phone].    You will receive your lab results and next steps via [facility] when the lab results return.    Sincerely,  CRISSY Sotomayor CNP

## 2024-12-31 ENCOUNTER — VIRTUAL VISIT (OUTPATIENT)
Dept: INTERNAL MEDICINE | Facility: CLINIC | Age: 54
End: 2024-12-31
Payer: COMMERCIAL

## 2024-12-31 DIAGNOSIS — J32.9 SINUSITIS, UNSPECIFIED CHRONICITY, UNSPECIFIED LOCATION: Primary | ICD-10-CM

## 2024-12-31 PROBLEM — R19.8 IRREGULAR BOWEL HABITS: Status: RESOLVED | Noted: 2022-12-20 | Resolved: 2024-12-31

## 2024-12-31 PROCEDURE — 99214 OFFICE O/P EST MOD 30 MIN: CPT | Mod: 95 | Performed by: NURSE PRACTITIONER

## 2024-12-31 NOTE — PROGRESS NOTES
Daphnie is a 54 year old who is being evaluated via a billable video visit.    How would you like to obtain your AVS? MyChart  If the video visit is dropped, the invitation should be resent by: Text to cell phone: [phone]  Will anyone else be joining your video visit? No      Assessment & Plan     Sinusitis, unspecified chronicity, unspecified location  She initially tested positive for influenza about 2 weeks ago.  She seemingly made a nice recovery from her influenza but over the last few days has developed sinus pain and pressure, facial fullness, as well as significant postnasal drip.    She says that her neck feels thick and swollen.    Symptoms are reminiscent of prior sinus infections.  She was treated for similar symptoms this past November with Augmentin and is requesting another prescription.    I also suggested that she use [place] pot saline rinses as well as intranasal corticosteroid sprays.        - amoxicillin-clavulanate (AUGMENTIN) 875-125 MG tablet; Take 1 tablet by mouth 2 times daily for 7 days.                Subjective   Daphnie is a 54 year old, presenting for the following health issues:  Follow Up (F/U influenza(12-17), still has sore throat and coughing up a lot of phlegm)    History of Present Illness       Reason for visit:  Fu influenza    She eats 2-3 servings of fruits and vegetables daily.She consumes 0 sweetened beverage(s) daily. She exercises with enough effort to increase her heart rate 3 or less days per week.   She is taking medications regularly.                   Objective           Vitals:  No vitals were obtained today due to virtual visit.    Physical Exam   GENERAL: alert and no distress  EYES: Eyes grossly normal to inspection.  No discharge or erythema, or obvious scleral/conjunctival abnormalities.  RESP: No audible wheeze, cough, or visible cyanosis.    SKIN: Visible skin clear. No significant rash, abnormal pigmentation or lesions.  NEURO: Cranial nerves grossly intact.   Mentation and speech appropriate for age.  PSYCH: Appropriate affect, tone, and pace of words          Video-Visit Details    Type of service:  Video Visit   Originating Location (pt. Location): Home    Distant Location (provider location):  On-site  Platform used for Video Visit: Doxim[employer]  Signed Electronically by: Joel Yates CNP

## 2025-01-04 ENCOUNTER — HEALTH MAINTENANCE LETTER (OUTPATIENT)
Age: 55
End: 2025-01-04

## 2025-01-04 DIAGNOSIS — R05.3 CHRONIC COUGH: ICD-10-CM

## 2025-01-06 RX ORDER — FLUTICASONE PROPIONATE AND SALMETEROL 100; 50 UG/1; UG/1
1 POWDER RESPIRATORY (INHALATION) EVERY 12 HOURS
Qty: 60 EACH | Refills: 2 | Status: SHIPPED | OUTPATIENT
Start: 2025-01-06

## 2025-02-27 ENCOUNTER — OFFICE VISIT (OUTPATIENT)
Dept: FAMILY MEDICINE | Facility: CLINIC | Age: 55
End: 2025-02-27
Payer: COMMERCIAL

## 2025-02-27 ENCOUNTER — ANCILLARY PROCEDURE (OUTPATIENT)
Dept: GENERAL RADIOLOGY | Facility: CLINIC | Age: 55
End: 2025-02-27
Attending: NURSE PRACTITIONER
Payer: COMMERCIAL

## 2025-02-27 VITALS
HEART RATE: 80 BPM | WEIGHT: 160 LBS | BODY MASS INDEX: 28.35 KG/M2 | HEIGHT: 63 IN | OXYGEN SATURATION: 99 % | SYSTOLIC BLOOD PRESSURE: 104 MMHG | DIASTOLIC BLOOD PRESSURE: 70 MMHG | TEMPERATURE: 98.7 F | RESPIRATION RATE: 16 BRPM

## 2025-02-27 DIAGNOSIS — G43.109 MIGRAINE WITH AURA AND WITHOUT STATUS MIGRAINOSUS, NOT INTRACTABLE: ICD-10-CM

## 2025-02-27 DIAGNOSIS — M25.562 CHRONIC PAIN OF LEFT KNEE: ICD-10-CM

## 2025-02-27 DIAGNOSIS — G89.29 CHRONIC PAIN OF LEFT KNEE: ICD-10-CM

## 2025-02-27 DIAGNOSIS — J32.9 CHRONIC SINUSITIS, UNSPECIFIED LOCATION: ICD-10-CM

## 2025-02-27 DIAGNOSIS — E78.5 HYPERLIPIDEMIA LDL GOAL <100: ICD-10-CM

## 2025-02-27 DIAGNOSIS — F41.1 GENERALIZED ANXIETY DISORDER: ICD-10-CM

## 2025-02-27 DIAGNOSIS — E88.819 INSULIN RESISTANCE: ICD-10-CM

## 2025-02-27 DIAGNOSIS — R74.8 ELEVATED ALKALINE PHOSPHATASE LEVEL: ICD-10-CM

## 2025-02-27 DIAGNOSIS — Z11.59 NEED FOR HEPATITIS C SCREENING TEST: ICD-10-CM

## 2025-02-27 DIAGNOSIS — R06.83 SNORING: ICD-10-CM

## 2025-02-27 DIAGNOSIS — Z00.00 HEALTHCARE MAINTENANCE: ICD-10-CM

## 2025-02-27 DIAGNOSIS — Z00.00 ROUTINE GENERAL MEDICAL EXAMINATION AT A HEALTH CARE FACILITY: Primary | ICD-10-CM

## 2025-02-27 DIAGNOSIS — M81.0 AGE-RELATED OSTEOPOROSIS WITHOUT CURRENT PATHOLOGICAL FRACTURE: ICD-10-CM

## 2025-02-27 DIAGNOSIS — Z13.220 LIPID SCREENING: ICD-10-CM

## 2025-02-27 PROBLEM — K62.5 HEMORRHAGE OF RECTUM AND ANUS: Status: RESOLVED | Noted: 2021-12-14 | Resolved: 2025-02-27

## 2025-02-27 PROBLEM — K62.5 RECTAL HEMORRHAGE: Status: RESOLVED | Noted: 2022-12-07 | Resolved: 2025-02-27

## 2025-02-27 PROBLEM — R19.8 DIGESTIVE SYMPTOM: Status: RESOLVED | Noted: 2022-12-08 | Resolved: 2025-02-27

## 2025-02-27 RX ORDER — AZELASTINE 1 MG/ML
1 SPRAY, METERED NASAL 2 TIMES DAILY
Qty: 30 ML | Refills: 1 | Status: SHIPPED | OUTPATIENT
Start: 2025-02-27

## 2025-02-27 RX ORDER — ALENDRONATE SODIUM 70 MG/1
70 TABLET ORAL
Qty: 12 TABLET | Refills: 3 | Status: SHIPPED | OUTPATIENT
Start: 2025-02-27

## 2025-02-27 SDOH — HEALTH STABILITY: PHYSICAL HEALTH: ON AVERAGE, HOW MANY DAYS PER WEEK DO YOU ENGAGE IN MODERATE TO STRENUOUS EXERCISE (LIKE A BRISK WALK)?: 0 DAYS

## 2025-02-27 SDOH — HEALTH STABILITY: PHYSICAL HEALTH: ON AVERAGE, HOW MANY MINUTES DO YOU ENGAGE IN EXERCISE AT THIS LEVEL?: 0 MIN

## 2025-02-27 ASSESSMENT — ANXIETY QUESTIONNAIRES
4. TROUBLE RELAXING: SEVERAL DAYS
GAD7 TOTAL SCORE: 4
8. IF YOU CHECKED OFF ANY PROBLEMS, HOW DIFFICULT HAVE THESE MADE IT FOR YOU TO DO YOUR WORK, TAKE CARE OF THINGS AT HOME, OR GET ALONG WITH OTHER PEOPLE?: SOMEWHAT DIFFICULT
3. WORRYING TOO MUCH ABOUT DIFFERENT THINGS: SEVERAL DAYS
5. BEING SO RESTLESS THAT IT IS HARD TO SIT STILL: NOT AT ALL
7. FEELING AFRAID AS IF SOMETHING AWFUL MIGHT HAPPEN: SEVERAL DAYS
GAD7 TOTAL SCORE: 4
GAD7 TOTAL SCORE: 4
1. FEELING NERVOUS, ANXIOUS, OR ON EDGE: SEVERAL DAYS
2. NOT BEING ABLE TO STOP OR CONTROL WORRYING: NOT AT ALL
6. BECOMING EASILY ANNOYED OR IRRITABLE: NOT AT ALL
7. FEELING AFRAID AS IF SOMETHING AWFUL MIGHT HAPPEN: SEVERAL DAYS
IF YOU CHECKED OFF ANY PROBLEMS ON THIS QUESTIONNAIRE, HOW DIFFICULT HAVE THESE PROBLEMS MADE IT FOR YOU TO DO YOUR WORK, TAKE CARE OF THINGS AT HOME, OR GET ALONG WITH OTHER PEOPLE: SOMEWHAT DIFFICULT

## 2025-02-27 ASSESSMENT — PAIN SCALES - GENERAL: PAINLEVEL_OUTOF10: MODERATE PAIN (4)

## 2025-02-27 ASSESSMENT — SOCIAL DETERMINANTS OF HEALTH (SDOH): HOW OFTEN DO YOU GET TOGETHER WITH FRIENDS OR RELATIVES?: MORE THAN THREE TIMES A WEEK

## 2025-02-27 NOTE — PROGRESS NOTES
Preventive Care Visit  [facility] THONY THACKERRory Wilson, APRN CNP, Nurse Practitioner  Feb 27, 2025      Assessment & Plan   Problem List Items Addressed This Visit       Routine general medical examination at a health care facility    -  Primary      Migraine with aura and without status migrainosus, not intractable     Would like to reassess her migraines with a different neurologist, referral given  Gluten free diet  Medications include gabapentin at nighttime, sumatriptan as an abortive medicine.           Relevant Orders    Adult Neurology  Referral    Healthcare maintenance     Pap smear done 2022 due 2027  Colonoscopy done 1/252023 repeat in 10 years  Mammogram done 10/2024  Declines HIV/hepatitis C, vaccines COVID influenza, hepatitis B.  She will consider shingles vaccine but would like to check with her insurance and consider getting it in the pharmacy.            Relevant Orders    Comprehensive metabolic panel (BMP + Alb, Alk Phos, ALT, AST, Total. Bili, TP)    Generalized anxiety disorder     Anxiety is under control right now, would like to continue on Lexapro 10 mg.          Hyperlipidemia LDL goal <100          Recent Labs   Lab Test 08/05/24  0920 12/07/22  1006   CHOL 235* 260*   HDL 42* 48*   * 154*   TRIG 257* 292*   Non  than 130 is considered high risk, this is a proxy for LDL-P     Apo B: 137 (High)  Apolipoprotein B (Apo B) is a test to evaluate for heart disease causing lipid particles      [facility] reference range for Apo B  Here are the reference ranges:  Males/Females > 18 years of age  Desirable <90  Above desirable: 90-99  Borderline High 100-119  High: 120-130  Very high > or = 140     The 10-year ASCVD risk score (Melissa VORA, et al., 2019) is: 2.2%    Values used to calculate the score:      Age: 54 years      Sex: Female      Is Non- : No      Diabetic: No      Tobacco smoker: No      Systolic Blood Pressure: 104  mmHg      Is BP treated: No      HDL Cholesterol: 42 mg/dL      Total Cholesterol: 235 mg/dL     Coronary CT with calcium scan     IMPRESSIONS:  1.  No coronary calcifications.  2.  The total Agatston calcium score is 0 placing the patient in the  lowest percentile when compared to age and gender matched control  group.  3.  Please review the separate Radiology report for incidental  noncardiac findings.      FINDINGS:     CORONARY ARTERY CALCIUM SCORES:   Total calcium score: 0  Left main coronary artery: 0  Left anterior descending coronary artery: 0  Circumflex coronary artery: 0  Right coronary artery: 0     Family history  Parents passed from COVID during Covid  Grandparents  PGF: diabetes     MDM: Lipids are elevated including triglycerides, non-HDL, LDL levels.  Since the coronary CT calcium scan was 0 I feel we can continue to monitor her lipids even when they are so elevated.  We will continue to monitor this yearly.    Plan: Start a high dose fish oil supplement to help lower triglyceride levels.  If no improvement consider fenofibrate in the future.           Elevated alkaline phosphatase level    Relevant Orders    Bone specific alk phosphatase    Insulin resistance     Lost weight  Will recheck insulin resistance with fasting insulin and glucose         Age-related osteoporosis without current pathological fracture     Dexa scan shows osteoporosis in the lumbar spine, recommended medication for low bone density.  Medication: Fosamax 70 mg once weekly has been sent to the pharmacy; patient had stopped taking it. But we discussed normal use of fosamax that she would continue it for the time being likely 5 years of treatment.          Relevant Medications    alendronate (FOSAMAX) 70 MG tablet     Other Visit Diagnoses           Need for hepatitis C screening test        Relevant Orders    Hepatitis C Screen Reflex to HCV RNA Quant and Genotype    Chronic pain of left knee        Relevant Orders    XR Knee  "Left 3 Views    Lipid screening        Relevant Orders    Lipid panel reflex to direct LDL Fasting    Apolipoprotein B    Snoring        Relevant Orders    Adult Sleep Eval & Management  Referral    Chronic sinusitis, unspecified location        Relevant Medications    azelastine (ASTELIN) 0.1 % nasal spray    Other Relevant Orders    Adult ENT  Referral         Having difficulty with breathing through her nose.     Patient has been advised of split billing requirements and indicates understanding: Yes       BMI  Estimated body mass index is 28.07 kg/m  as calculated from the following:    Height as of this encounter: 1.608 m (5' 3.3\").    Weight as of this encounter: 72.6 kg (160 lb).   Weight management plan: Discussed healthy diet and exercise guidelines    Counseling  Appropriate preventive services were addressed with this patient via screening, questionnaire, or discussion as appropriate for fall prevention, nutrition, physical activity, Tobacco-use cessation, social engagement, weight loss and cognition.  Checklist reviewing preventive services available has been given to the patient.  Reviewed patient's diet, addressing concerns and/or questions.   She is at risk for psychosocial distress and has been provided with information to reduce risk.     FUTURE APPOINTMENTS:       - Follow-up for annual visit or as needed      Subjective   Daphnie is a 54 year old, presenting for the following:  Physical           HPI    Left knee pain; maybe hurt it ; couple weeks of pain and had difficulty walking on it. The pain went away but now has pain in the inner knee, sore, pulls and hurts and. 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     Osteoporosis, will restart the fosamax    Nutrition:whole food, gluten free  Exercise/movement: sedentary   Sleep: no  Stress: stress at work  Alcohol: none  Tobacco: none  Drugs: none    Family " history  updated  Health Care Directive  Patient does not have a Health Care Directive: Discussed advance care planning with patient; information given to patient to review.      2/27/2025   General Health   How would you rate your overall physical health? Good   Feel stress (tense, anxious, or unable to sleep) To some extent   (!) STRESS CONCERN      2/27/2025   Nutrition   Three or more servings of calcium each day? Yes   Diet: Gluten-free/reduced    Other   If other, please elaborate: Dairy free, Avoid breads/crackers   How many servings of fruit and vegetables per day? 4 or more   How many sweetened beverages each day? (!) 2       Multiple values from one day are sorted in reverse-chronological order         2/27/2025   Exercise   Days per week of moderate/strenous exercise 0 days   Average minutes spent exercising at this level 0 min   (!) EXERCISE CONCERN      2/27/2025   Social Factors   Frequency of gathering with friends or relatives More than three times a week   Worry food won't last until get money to buy more No   Food not last or not have enough money for food? No   Do you have housing? (Housing is defined as stable permanent housing and does not include staying ouside in a car, in a tent, in an abandoned building, in an overnight shelter, or couch-surfing.) Yes   Are you worried about losing your housing? No   Lack of transportation? No   Unable to get utilities (heat,electricity)? No         2/27/2025   Fall Risk   Fallen 2 or more times in the past year? No   Trouble with walking or balance? No          2/27/2025   Dental   Dentist two times every year? Yes         Today's PHQ-2 Score:       2/27/2025    10:23 AM   PHQ-2 ( 1999 [employer])   Q1: Little interest or pleasure in doing things 0   Q2: Feeling down, depressed or hopeless 0   PHQ-2 Score 0    Q1: Little interest or pleasure in doing things Not at all   Q2: Feeling down, depressed or hopeless Not at all   PHQ-2 Score 0       Patient-reported            2025   Substance Use   Alcohol more than 3/day or more than 7/wk Not Applicable   Do you use any other substances recreationally? No     Social History     Tobacco Use    Smoking status: Never     Passive exposure: Never    Smokeless tobacco: Never   Vaping Use    Vaping status: Never Used   Substance Use Topics    Alcohol use: No     Comment: rarly on weekends    Drug use: No           10/17/2024   LAST FHS-7 RESULTS   1st degree relative breast or ovarian cancer No   Any relative bilateral breast cancer No   Any male have breast cancer No   Any ONE woman have BOTH breast AND ovarian cancer No   Any woman with breast cancer before 50yrs No   2 or more relatives with breast AND/OR ovarian cancer No   2 or more relatives with breast AND/OR bowel cancer No       Mammogram Screening - Mammogram every 1-2 years updated in Health Maintenance based on mutual decision making        2025   STI Screening   New sexual partner(s) since last STI/HIV test? No     History of abnormal Pap smear: No - age 30-64 HPV with reflex Pap every 5 years recommended        Latest Ref Rng & Units 2022     2:57 PM   PAP / HPV   PAP  Negative for Intraepithelial Lesion or Malignancy (NILM)    HPV 16 DNA Negative Negative    HPV 18 DNA Negative Negative    Other HR HPV Negative Negative      ASCVD Risk   The 10-year ASCVD risk score (Melissa VORA, et al., 2019) is: 3.6%    Values used to calculate the score:      Age: 54 years      Sex: Female      Is Non- : No      Diabetic: No      Tobacco smoker: No      Systolic Blood Pressure: 134 mmHg      Is BP treated: No      HDL Cholesterol: 42 mg/dL      Total Cholesterol: 235 mg/dL    Fracture Risk Assessment Tool  Link to Frax Calculator  Use the information below to complete the Frax calculator  : 1970  Sex: female  Weight (kg): 72.6 kg (actual weight)  Height (cm): 160.8 cm  Previous Fragility Fracture:  No  History of parent with  "fractured hip:  No  Current Smoking:  No  Patient has been on glucocorticoids for more than 3 months (5mg/day or more): No  Rheumatoid Arthritis on Problem List:  No  Secondary Osteoporosis on Problem List:  No  Consumes 3 or more units of alcohol per day: No  Femoral Neck BMD (g/cm2)           Reviewed and updated as needed this visit by Provider                    Past Medical History:   Diagnosis Date    History of blood transfusion Age 3 years old.    NO ACTIVE PROBLEMS      Past Surgical History:   Procedure Laterality Date    BIOPSY  5+yrs ago    breast tissue and mouth tissue and cervical tissue - all nor    COLONOSCOPY  5/22/2013    normal    TONSILLECTOMY & ADENOIDECTOMY           Review of Systems  CONSTITUTIONAL: NEGATIVE for fever, chills, change in weight  INTEGUMENTARY/SKIN: NEGATIVE for worrisome rashes, moles or lesions  EYES: NEGATIVE for vision changes or irritation  ENT/MOUTH: NEGATIVE for ear, mouth and throat problems  RESP: NEGATIVE for significant cough or SOB  BREAST: NEGATIVE for masses, tenderness or discharge  CV: NEGATIVE for chest pain, palpitations or peripheral edema  GI: NEGATIVE for nausea, abdominal pain, heartburn, or change in bowel habits  : NEGATIVE for frequency, dysuria, or hematuria  MUSCULOSKELETAL:POSITIVE  for left knee pain  NEURO: POSITIVE for headache  ENDOCRINE: NEGATIVE for temperature intolerance, skin/hair changes  HEME: NEGATIVE for bleeding problems  PSYCHIATRIC: NEGATIVE for changes in mood or affect     Objective    Exam  /70 (BP Location: Right arm, Patient Position: Sitting, Cuff Size: Adult Large)   Pulse 80   Temp 98.7  F (37.1  C) (Tympanic)   Resp 16   Ht 1.608 m (5' 3.3\")   Wt 72.6 kg (160 lb)   LMP 01/01/2020 (Exact Date)   SpO2 99%   BMI 28.07 kg/m     Estimated body mass index is 28.07 kg/m  as calculated from the following:    Height as of this encounter: 1.608 m (5' 3.3\").    Weight as of this encounter: 72.6 kg (160 lb).    Physical " Exam  Constitutional:       Appearance: Normal appearance.   HENT:      Head: Normocephalic.      Right Ear: Tympanic membrane, ear canal and external ear normal.      Left Ear: Tympanic membrane, ear canal and external ear normal.      Nose: Nose normal.      Mouth/Throat:      Mouth: Mucous membranes are moist.      Pharynx: Oropharynx is clear.      Comments: Mallampati score + 3  Eyes:      Extraocular Movements: Extraocular movements intact.      Conjunctiva/sclera: Conjunctivae normal.      Pupils: Pupils are equal, round, and reactive to light.   Neck:      Thyroid: No thyroid mass, thyromegaly or thyroid tenderness.      Trachea: Trachea normal.   Cardiovascular:      Rate and Rhythm: Normal rate and regular rhythm.      Heart sounds: Normal heart sounds.   Pulmonary:      Effort: Pulmonary effort is normal.      Breath sounds: Normal breath sounds.   Abdominal:      General: Abdomen is flat. Bowel sounds are normal.   Musculoskeletal:         General: Normal range of motion.      Cervical back: Normal range of motion.      Left knee: No swelling. No LCL laxity, MCL laxity, ACL laxity or PCL laxity.     Instability Tests: Medial Jorge test positive.   Lymphadenopathy:      Cervical: No cervical adenopathy.   Skin:     General: Skin is warm and dry.   Neurological:      Mental Status: She is alert and oriented to person, place, and time.   Psychiatric:         Mood and Affect: Mood normal.         Behavior: Behavior normal.         Thought Content: Thought content normal.         Judgment: Judgment normal.               Signed Electronically by: CRISSY Sotomayor CNP    Answers submitted by the patient for this visit:  Patient Health Questionnaire (G7) (Submitted on 2/27/2025)  MERY 7 TOTAL SCORE: 4

## 2025-02-27 NOTE — ASSESSMENT & PLAN NOTE
Dexa scan shows osteoporosis in the lumbar spine, recommended medication for low bone density.  Medication: Fosamax 70 mg once weekly has been sent to the pharmacy; patient had stopped taking it. But we discussed normal use of fosamax that she would continue it for the time being likely 5 years of treatment.

## 2025-02-27 NOTE — PATIENT INSTRUCTIONS
Patient Education   Bone density  Creatinine monohydrate 5 gram daily  Weight lifting   Calcium 500 mg 1-2 times a day     If you're just starting with strength training, it's important to focus on exercises that build a solid foundation, improve mobility, and prevent injury. Here are some beginner-friendly exercises:  1. Squats  Target: Legs (quadriceps, hamstrings), glutes  How to do it: Stand with your feet shoulder-width apart. Bend your knees and lower your body as if you re sitting in a chair, keeping your chest up and back straight. Push through your heels to return to standing.  2. Push-ups  Target: Chest, shoulders, triceps  How to do it: Start in a plank position with your hands under your shoulders. Lower your body by bending your elbows, keeping your body in a straight line. Push through your palms to return to the start position. Modify by doing them on your knees if needed.  3. Bodyweight Lunges  Target: Legs (quads, hamstrings, glutes), balance  How to do it: Step forward with one leg, lowering your hips until both knees are bent at about 90 degrees. Push off the front foot to return to the starting position. Repeat on the other side.  4. Plank  Target: Core (abs, lower back, shoulders)  How to do it: Lie face down and lift your body onto your forearms and toes. Keep your body in a straight line from head to heels, engaging your core. Hold for 20-30 seconds, gradually increasing time as you get stronger.  5. Glute Bridges  Target: Glutes, hamstrings, lower back  How to do it: Lie on your back with your knees bent and feet flat on the floor. Press through your heels and lift your hips toward the ceiling, squeezing your glutes. Lower your hips back to the ground.  6. Dumbbell Rows  Target: Back (latissimus dorsi, traps, biceps)  How to do it: Hold a dumbbell in each hand, bend at the waist with your knees slightly bent, and keep your back flat. Pull the dumbbells towards your ribcage, then lower them  back down.  7. Standing Dumbbell Shoulder Press  Target: Shoulders (deltoids), triceps  How to do it: Hold a dumbbell in each hand at shoulder height. Press the dumbbells overhead until your arms are fully extended, then slowly lower back to the start position.  8. Deadlifts (Bodyweight or Light Dumbbells)  Target: Hamstrings, glutes, lower back  How to do it: Stand with your feet shoulder-width apart. Hinge at your hips while keeping your back flat, lower your torso toward the ground, then return to standing. Use light weights if you're just starting.  9. Step-ups  Target: Legs (quads, hamstrings, glutes)  How to do it:  front of a bench or sturdy platform. Step one foot onto the bench, pressing through the heel to lift your body up. Step back down and repeat on the other side.  10. Bicep Curls  Target: Biceps  How to do it: Hold a dumbbell in each hand, arms extended straight down. Curl the dumbbells toward your shoulders, squeezing your biceps at the top, then slowly lower them back down.  Tips for Beginner Strength Training:  Start with bodyweight exercises to get your form right before adding weights.  Use light weights when starting with dumbbells or resistance bands.  Warm up before starting and stretch after your workout.  Rest between sets to allow your muscles to recover.  Progress gradually by increasing weight, reps, or sets over time as you get stronger.  By incorporating these exercises into your routine 2-3 times a week, you ll be building strength while avoiding injury. Do you have any specific goals or equipment? I can give you more tailored advice if you'd like!  Preventive Care Advice   This is general advice given by our system to help you stay healthy. However, your care team may have specific advice just for you. Please talk to your care team about your preventive care needs.  Nutrition  Eat 5 or more servings of fruits and vegetables each day.  Try wheat bread, brown rice and whole  grain pasta (instead of white bread, rice, and pasta).  Get enough calcium and vitamin D. Check the label on foods and aim for 100% of the RDA (recommended daily allowance).  Lifestyle  Exercise at least 150 minutes each week  (30 minutes a day, 5 days a week).  Do muscle strengthening activities 2 days a week. These help control your weight and prevent disease.  No smoking.  Wear sunscreen to prevent skin cancer.  Have a dental exam and cleaning every 6 months.  Yearly exams  See your health care team every year to talk about:  Any changes in your health.  Any medicines your care team has prescribed.  Preventive care, family planning, and ways to prevent chronic diseases.  Shots (vaccines)   HPV shots (up to age 26), if you've never had them before.  Hepatitis B shots (up to age 59), if you've never had them before.  COVID-19 shot: Get this shot when it's due.  Flu shot: Get a flu shot every year.  Tetanus shot: Get a tetanus shot every 10 years.  Pneumococcal, hepatitis A, and RSV shots: Ask your care team if you need these based on your risk.  Shingles shot (for age 50 and up)  General health tests  Diabetes screening:  Starting at age 35, Get screened for diabetes at least every 3 years.  If you are younger than age 35, ask your care team if you should be screened for diabetes.  Cholesterol test: At age 39, start having a cholesterol test every 5 years, or more often if advised.  Bone density scan (DEXA): At age 50, ask your care team if you should have this scan for osteoporosis (brittle bones).  Hepatitis C: Get tested at least once in your life.  STIs (sexually transmitted infections)  Before age 24: Ask your care team if you should be screened for STIs.  After age 24: Get screened for STIs if you're at risk. You are at risk for STIs (including HIV) if:  You are sexually active with more than one person.  You don't use condoms every time.  You or a partner was diagnosed with a sexually transmitted  infection.  If you are at risk for HIV, ask about PrEP medicine to prevent HIV.  Get tested for HIV at least once in your life, whether you are at risk for HIV or not.  Cancer screening tests  Cervical cancer screening: If you have a cervix, begin getting regular cervical cancer screening tests starting at age 21.  Breast cancer scan (mammogram): If you've ever had breasts, begin having regular mammograms starting at age 40. This is a scan to check for breast cancer.  Colon cancer screening: It is important to start screening for colon cancer at age 45.  Have a colonoscopy test every 10 years (or more often if you're at risk) Or, ask your provider about stool tests like a FIT test every year or Cologuard test every 3 years.  To learn more about your testing options, visit:   .  For help making a decision, visit:   https://bit.ly/iz64595.  Prostate cancer screening test: If you have a prostate, ask your care team if a prostate cancer screening test (PSA) at age 55 is right for you.  Lung cancer screening: If you are a current or former smoker ages 50 to 80, ask your care team if ongoing lung cancer screenings are right for you.  For informational purposes only. Not to replace the advice of your health care provider. Copyright   2023 [facility]. All rights reserved. Clinically reviewed by the [facility]. [employer] [id number] - REV 01/24.  Learning About Stress  What is stress?     Stress is your body's response to a hard situation. Your body can have a physical, emotional, or mental response. Stress is a fact of life for most people, and it affects everyone differently. What causes stress for you may not be stressful for someone else.  A lot of things can cause stress. You may feel stress when you go on a job interview, take a test, or run a race. This kind of short-term stress is normal and even useful. It can help you if you need to work hard or react quickly. For example,  stress can help you finish an important job on time.  Long-term stress is caused by ongoing stressful situations or events. Examples of long-term stress include long-term health problems, ongoing problems at work, or conflicts in your family. Long-term stress can harm your health.  How does stress affect your health?  When you are stressed, your body responds as though you are in danger. It makes hormones that speed up your heart, make you breathe faster, and give you a burst of energy. This is called the fight-or-flight stress response. If the stress is over quickly, your body goes back to normal and no harm is done.  But if stress happens too often or lasts too long, it can have bad effects. Long-term stress can make you more likely to get sick, and it can make symptoms of some diseases worse. If you tense up when you are stressed, you may develop neck, shoulder, or low back pain. Stress is linked to high blood pressure and heart disease.  Stress also harms your emotional health. It can make you ding, tense, or depressed. Your relationships may suffer, and you may not do well at work or school.  What can you do to manage stress?  You can try these things to help manage stress:   Do something active. Exercise or activity can help reduce stress. Walking is a great way to get started. Even everyday activities such as housecleaning or yard work can help.  Try yoga or marlen chi. These techniques combine exercise and meditation. You may need some training at first to learn them.  Do something you enjoy. For example, listen to music or go to a movie. Practice your hobby or do volunteer work.  Meditate. This can help you relax, because you are not worrying about what happened before or what may happen in the future.  Do guided imagery. Imagine yourself in any setting that helps you feel calm. You can use online videos, books, or a teacher to guide you.  Do breathing exercises. For example:  From a standing position, bend  "forward from the waist with your knees slightly bent. Let your arms dangle close to the floor.  Breathe in slowly and deeply as you return to a standing position. Roll up slowly and lift your head last.  Hold your breath for just a few seconds in the standing position.  Breathe out slowly and bend forward from the waist.  Let your feelings out. Talk, laugh, cry, and express anger when you need to. Talking with supportive friends or family, a counselor, or a kaleigh leader about your feelings is a healthy way to relieve stress. Avoid discussing your feelings with people who make you feel worse.  Write. It may help to write about things that are bothering you. This helps you find out how much stress you feel and what is causing it. When you know this, you can find better ways to cope.  What can you do to prevent stress?  You might try some of these things to help prevent stress:  Manage your time. This helps you find time to do the things you want and need to do.  Get enough sleep. Your body recovers from the stresses of the day while you are sleeping.  Get support. Your family, friends, and community can make a difference in how you experience stress.  Limit your news feed. Avoid or limit time on social media or news that may make you feel stressed.  Do something active. Exercise or activity can help reduce stress. Walking is a great way to get started.  Where can you learn more?  Go to https://www.[employer].net/patiented  Enter N032 in the search box to learn more about \"Learning About Stress.\"  Current as of: October 24, 2023  Content Version: 14.3    2024 [employer].   Care instructions adapted under license by your healthcare professional. If you have questions about a medical condition or this instruction, always ask your healthcare professional. [employer] disclaims any warranty or liability for your use of this information.       "

## 2025-02-27 NOTE — ASSESSMENT & PLAN NOTE
Recent Labs   Lab Test 08/05/24  0920 12/07/22  1006   CHOL 235* 260*   HDL 42* 48*   * 154*   TRIG 257* 292*   Non  than 130 is considered high risk, this is a proxy for LDL-P     Apo B: 137 (High)  Apolipoprotein B (Apo B) is a test to evaluate for heart disease causing lipid particles      [facility] Lab reference range for Apo B  Here are the reference ranges:  Males/Females > 18 years of age  Desirable <90  Above desirable: 90-99  Borderline High 100-119  High: 120-130  Very high > or = 140     The 10-year ASCVD risk score (Melissa VORA, et al., 2019) is: 2.2%    Values used to calculate the score:      Age: 54 years      Sex: Female      Is Non- : No      Diabetic: No      Tobacco smoker: No      Systolic Blood Pressure: 104 mmHg      Is BP treated: No      HDL Cholesterol: 42 mg/dL      Total Cholesterol: 235 mg/dL     Coronary CT with calcium scan     IMPRESSIONS:  1.  No coronary calcifications.  2.  The total Agatston calcium score is 0 placing the patient in the  lowest percentile when compared to age and gender matched control  group.  3.  Please review the separate Radiology report for incidental  noncardiac findings.      FINDINGS:     CORONARY ARTERY CALCIUM SCORES:   Total calcium score: 0  Left main coronary artery: 0  Left anterior descending coronary artery: 0  Circumflex coronary artery: 0  Right coronary artery: 0     Family history  Parents passed from COVID during Covid  Grandparents  PGF: diabetes     MDM: Lipids are elevated including triglycerides, non-HDL, LDL levels.  Since the coronary CT calcium scan was 0 I feel we can continue to monitor her lipids even when they are so elevated.  We will continue to monitor this yearly.    Plan: Start a high dose fish oil supplement to help lower triglyceride levels.  If no improvement consider fenofibrate in the future.

## 2025-02-27 NOTE — ASSESSMENT & PLAN NOTE
Would like to reassess her migraines with a different neurologist, referral given  Gluten free diet  Medications include gabapentin at nighttime, sumatriptan as an abortive medicine.

## 2025-02-27 NOTE — ASSESSMENT & PLAN NOTE
Pap smear done 2022 due 2027  Colonoscopy done 1/252023 repeat in 10 years  Mammogram done 10/2024  Declines HIV/hepatitis C, vaccines COVID influenza, hepatitis B.  She will consider shingles vaccine but would like to check with her insurance and consider getting it in the pharmacy.

## 2025-03-12 ENCOUNTER — LAB (OUTPATIENT)
Dept: LAB | Facility: CLINIC | Age: 55
End: 2025-03-12
Payer: COMMERCIAL

## 2025-03-12 DIAGNOSIS — Z11.59 NEED FOR HEPATITIS C SCREENING TEST: ICD-10-CM

## 2025-03-12 DIAGNOSIS — R74.8 ELEVATED ALKALINE PHOSPHATASE LEVEL: ICD-10-CM

## 2025-03-12 DIAGNOSIS — Z00.00 HEALTHCARE MAINTENANCE: ICD-10-CM

## 2025-03-12 DIAGNOSIS — Z13.220 LIPID SCREENING: ICD-10-CM

## 2025-03-12 LAB
CHOLEST SERPL-MCNC: 266 MG/DL
FASTING STATUS PATIENT QL REPORTED: YES
HCV AB SERPL QL IA: NONREACTIVE
HDLC SERPL-MCNC: 36 MG/DL
INSULIN SERPL-ACNC: 19.2 UU/ML (ref 2.6–24.9)
LDLC SERPL CALC-MCNC: ABNORMAL MG/DL
LDLC SERPL DIRECT ASSAY-MCNC: 117 MG/DL
NONHDLC SERPL-MCNC: 230 MG/DL
TRIGL SERPL-MCNC: 624 MG/DL

## 2025-03-12 PROCEDURE — 86803 HEPATITIS C AB TEST: CPT

## 2025-03-12 PROCEDURE — 82172 ASSAY OF APOLIPOPROTEIN: CPT | Mod: 90

## 2025-03-12 PROCEDURE — 84080 ASSAY ALKALINE PHOSPHATASES: CPT | Mod: 90

## 2025-03-12 PROCEDURE — 36415 COLL VENOUS BLD VENIPUNCTURE: CPT

## 2025-03-12 PROCEDURE — 99000 SPECIMEN HANDLING OFFICE-LAB: CPT

## 2025-03-12 PROCEDURE — 80061 LIPID PANEL: CPT

## 2025-03-12 PROCEDURE — 83721 ASSAY OF BLOOD LIPOPROTEIN: CPT | Mod: 59

## 2025-03-13 ENCOUNTER — VIRTUAL VISIT (OUTPATIENT)
Dept: SLEEP MEDICINE | Facility: CLINIC | Age: 55
End: 2025-03-13
Attending: NURSE PRACTITIONER
Payer: COMMERCIAL

## 2025-03-13 VITALS — WEIGHT: 160 LBS | HEIGHT: 64 IN | BODY MASS INDEX: 27.31 KG/M2

## 2025-03-13 DIAGNOSIS — G47.10 HYPERSOMNIA: ICD-10-CM

## 2025-03-13 DIAGNOSIS — R06.81 WITNESSED EPISODE OF APNEA: Primary | ICD-10-CM

## 2025-03-13 DIAGNOSIS — F41.1 GENERALIZED ANXIETY DISORDER: ICD-10-CM

## 2025-03-13 DIAGNOSIS — R06.83 SNORING: ICD-10-CM

## 2025-03-13 LAB
ALBUMIN SERPL BCG-MCNC: 4.4 G/DL (ref 3.5–5.2)
ALP BONE SERPL-MCNC: 18.5 UG/L
ALP SERPL-CCNC: 120 U/L (ref 40–150)
ALT SERPL W P-5'-P-CCNC: 17 U/L (ref 0–50)
ANION GAP SERPL CALCULATED.3IONS-SCNC: 11 MMOL/L (ref 7–15)
AST SERPL W P-5'-P-CCNC: 19 U/L (ref 0–45)
BILIRUB SERPL-MCNC: 0.3 MG/DL
BUN SERPL-MCNC: 11.6 MG/DL (ref 6–20)
CALCIUM SERPL-MCNC: NORMAL MG/DL
CHLORIDE SERPL-SCNC: 106 MMOL/L (ref 98–107)
CREAT SERPL-MCNC: 0.83 MG/DL (ref 0.51–0.95)
EGFRCR SERPLBLD CKD-EPI 2021: 82 ML/MIN/1.73M2
FASTING STATUS PATIENT QL REPORTED: YES
GLUCOSE SERPL-MCNC: 81 MG/DL (ref 70–99)
HCO3 SERPL-SCNC: 25 MMOL/L (ref 22–29)
POTASSIUM SERPL-SCNC: 3.9 MMOL/L (ref 3.4–5.3)
PROT SERPL-MCNC: 7 G/DL (ref 6.4–8.3)
SODIUM SERPL-SCNC: 142 MMOL/L (ref 135–145)

## 2025-03-13 ASSESSMENT — SLEEP AND FATIGUE QUESTIONNAIRES
HOW LIKELY ARE YOU TO NOD OFF OR FALL ASLEEP WHILE SITTING INACTIVE IN A PUBLIC PLACE: WOULD NEVER DOZE
HOW LIKELY ARE YOU TO NOD OFF OR FALL ASLEEP WHILE SITTING QUIETLY AFTER LUNCH WITHOUT ALCOHOL: SLIGHT CHANCE OF DOZING
HOW LIKELY ARE YOU TO NOD OFF OR FALL ASLEEP WHILE SITTING AND READING: WOULD NEVER DOZE
HOW LIKELY ARE YOU TO NOD OFF OR FALL ASLEEP IN A CAR, WHILE STOPPED FOR A FEW MINUTES IN TRAFFIC: WOULD NEVER DOZE
HOW LIKELY ARE YOU TO NOD OFF OR FALL ASLEEP WHILE WATCHING TV: SLIGHT CHANCE OF DOZING
HOW LIKELY ARE YOU TO NOD OFF OR FALL ASLEEP WHILE SITTING AND TALKING TO SOMEONE: WOULD NEVER DOZE
HOW LIKELY ARE YOU TO NOD OFF OR FALL ASLEEP WHILE LYING DOWN TO REST IN THE AFTERNOON WHEN CIRCUMSTANCES PERMIT: MODERATE CHANCE OF DOZING
HOW LIKELY ARE YOU TO NOD OFF OR FALL ASLEEP WHEN YOU ARE A PASSENGER IN A CAR FOR AN HOUR WITHOUT A BREAK: SLIGHT CHANCE OF DOZING

## 2025-03-13 ASSESSMENT — PAIN SCALES - GENERAL: PAINLEVEL_OUTOF10: MODERATE PAIN (4)

## 2025-03-13 NOTE — NURSING NOTE
Current patient location:  Work    Is the patient currently in the state [facility]? YES    Visit mode: VIDEO    If the visit is dropped, the patient can be reconnected by:VIDEO VISIT: Text to cell phone:   Telephone Information:   Mobile [phone]       Will anyone else be joining the visit? NO  (If patient encounters technical issues they should call [phone])    Are changes needed to the allergy or medication list? No    Are refills needed on medications prescribed by this physician? NO    Pt reports neck and shoulder pain (always has it) - pain score 4    Rooming Documentation:  Questionnaire(s) completed    Reason for visit: Consult    Jacki CABRALF

## 2025-03-13 NOTE — PROGRESS NOTES
Virtual Visit Details    Type of service:  Video Visit     Originating Location (pt. Location): Home  Distant Location (provider location):  Off-site  Platform used for Video Visit: [employer]    Additional 15 minutes on the date of service was spent performing the following:    -Preparing to see the patient  -Obtaining and/or reviewing separately obtained history   -Ordering medications, tests, or procedures   -Documenting clinical information in the electronic or other health record     Thank you for the opportunity to participate in the care of  Daphnie Yung.    Assessment and Plan:    In summary Daphnie Yung is a 54 year old year old female here for sleep disturbance.  1. Witness apnea/Hypersomnia/Snoring/MERY   Daphnie Yung has high risk for obstructive sleep apnea based on the history of witness apnea, hypersomnia, snoring and a crowded airway. I educated the patient on the underlying pathophysiology of obstructive sleep apnea. We reviewed the risks associated with sleep apnea, including increased cardiovascular risk and overall death. We talked about treatments briefly. I recommend getting a Home sleep study. The patient should return to the clinic to discuss results and treatment option in a patient-centered approach.    History of cranial facial surgery? Yes. Patient has had adenoid surgery as a child and has had complications. Will need ENT clearance prior to initiating pressure therapy.    Lab reviewed: Discussed with patient.    History of present illness:    She is a 54 year old female who comes to the virtual clinic with a chief complaint of breathing issues that has been going on most of her life. Her friends and family members have informed her that she has significant pauses in her breathing during sleep followed by loud snoring. Despite adequate hours of sleep, she would still feel tired upon awakening. Her poor sleep quality also has had an adverse effect on her anxiety.     [facility]-Wake  Cycle(devoid of societal pressure):    Patient would try to initiate sleep at around 10:30 PM. Final wake up time is around 8 AM.    TIME IN BED:    1) Work/School Days:    Do you work or go to school? Yes   What time do you usually get into bed? 11:15 pm   About how long does it take you to fall asleep? 15 minutes   How often do you have trouble falling asleep? 50% of time   How often do you wake up during the night? Usually 3-4 times to readjust.   Do you work days/evenings/nights/rotating shifts? Days   What wakes you up at night? Pain    Use the bathroom    Anxiety    Nightmares    Other   Please elaborate: Breathing issues   How often do you have trouble falling back to sleep? 50% of time   About how long does it take to fall back to sleep? 15 minutes   What do you usually do if you have trouble getting back to sleep? Lay and think.  Readjust.   What time do you usually get out of bed to start your day? 6:40 am   Do you use an alarm? Yes   2) Weekends/Non-work Days/All Other Days    What time do you usually get into bed? 11:20 pm   About how long does it take you to fall asleep? -5 minutes   What time do you usually get out of bed to start your day? Varies - i try for the same time as weekdays   Do you use an alarm? No   SLEEP NEED    On average, about how much sleep do you think you get? 6-7 hrs/night   About how much sleep do you think you need? 8 hours/night   SLEEP POSITION    Which sleep positions do you prefer? Back    Side    Head Elevated   Do you do any of the following activities in bed? Read    Watch TV    Use phone, computer, or tablet   How often do you take a nap on purpose? Only when i am ill.   About how long are your naps? N/a   Do you feel better after naps?    How often do you doze off unintentionally? Tired at work - but dont fall asleep   Have you ever had a driving accident or near-miss due to sleepiness/drowsiness? No     Stop Bang Questionnaire    Question 3/13/2025 11:51 AM CDT - Filed  by Patient   Do you SNORE loudly (louder than talking or loud enough to be heard through closed doors)? No   Do you often feel TIRED, fatigued, or sleepy during daytime? Yes   Has anyone OBSERVED you stop breathing during your sleep? Yes   Do you have or are you being treated for high blood PRESSURE? No   BMI more than 35kg/m2? No   AGE over 50 years old? Yes   NECK circumference > 16 inches (40cm)? No   GENDER: Male? No   STOP-BANG Total Score (range: 0 - 8) 3 (High risk of JANET) Critical        ZANDER:  ZANDER Total Score: (Patient-Rptd) 16  Total score - [place]: (Patient-Rptd) 5 (3/13/2025 11:49 AM)    Patient told to return to review the results of the sleep study after it has been interpreted.    Patient Active Problem List   Diagnosis    Chronic rhinitis    Migraine with aura and without status migrainosus, not intractable    Adjustment disorder with anxiety    Other insomnia    Anal skin tag    Diverticular disease of colon    History of irritable bowel syndrome    Internal hemorrhoids    Seasonal allergic rhinitis due to pollen    Healthcare maintenance    Generalized anxiety disorder    Hyperlipidemia LDL goal <100    Elevated alkaline phosphatase level    Insulin resistance    Age-related osteoporosis without current pathological fracture     Past Medical History:   Diagnosis Date    Digestive symptom 12/08/2022    Hemorrhage of rectum and anus 12/14/2021    History of blood transfusion Age 3 years old.    NO ACTIVE PROBLEMS     Rectal hemorrhage 12/07/2022     Past Surgical History:   Procedure Laterality Date    BIOPSY  5+yrs ago    breast tissue and mouth tissue and cervical tissue - all nor    COLONOSCOPY  5/22/2013    normal    TONSILLECTOMY & ADENOIDECTOMY       Current Outpatient Medications   Medication Sig Dispense Refill    alendronate (FOSAMAX) 70 MG tablet Take 1 tablet (70 mg) by mouth every 7 days. 12 tablet 3    aspirin-acetaminophen-caffeine (EXCEDRIN MIGRAINE) 250-250-65 MG tablet EXCEDRIN  MIGRAINE 250-250-65 MG TABS      azelastine (ASTELIN) 0.1 % nasal spray Spray 1 spray into both nostrils 2 times daily. 30 mL 1    cetirizine (ZYRTEC) 10 MG tablet Take 10 mg by mouth daily as needed      escitalopram (LEXAPRO) 10 MG tablet TAKE 1 TABLET(10 MG) BY MOUTH DAILY 90 tablet 3    fluticasone-salmeterol (ADVAIR) 100-50 MCG/ACT inhaler INHALE 1 PUFF INTO THE LUNGS EVERY 12 HOURS 60 each 2    gabapentin (NEURONTIN) 100 MG capsule Take 100 mg by mouth 3 times daily      omeprazole (PRILOSEC) 20 MG DR capsule TAKE 1 CAPSULE(20 MG) BY MOUTH DAILY 90 capsule 0    SUMAtriptan (IMITREX) 100 MG tablet TAKE 1 TABLET BY MOUTH AT ONSET OF MIGRAINE. MAY REPEAT 1 TABLET 2 HOUR LATER. NO MORE THAN 2 PER DAY OR 3 DAYS EVERY WEEK       Adhesive tape, Cats, Dogs, Dust mites, Nkda [no known drug allergy], Seasonal allergies, Trees, and Latex  Social History     Socioeconomic History    Marital status:      Spouse name: Not on file    Number of children: Not on file    Years of education: Not on file    Highest education level: Not on file   Occupational History    Not on file   Tobacco Use    Smoking status: Never     Passive exposure: Never    Smokeless tobacco: Never   Vaping Use    Vaping status: Never Used   Substance and Sexual Activity    Alcohol use: No     Comment: rarly on weekends    Drug use: No    Sexual activity: Yes     Partners: Male     Birth control/protection: Male Surgical   Other Topics Concern    Parent/sibling w/ CABG, MI or angioplasty before 65F 55M? No   Social History Narrative    Not on file     Social Drivers of Health     Financial Resource Strain: Low Risk  (2/27/2025)    Financial Resource Strain     Within the past 12 months, have you or your family members you live with been unable to get utilities (heat, electricity) when it was really needed?: No   Food Insecurity: Low Risk  (2/27/2025)    Food Insecurity     Within the past 12 months, did you worry that your food would run out  before you got money to buy more?: No     Within the past 12 months, did the food you bought just not last and you didn t have money to get more?: No   Transportation Needs: Low Risk  (2/27/2025)    Transportation Needs     Within the past 12 months, has lack of transportation kept you from medical appointments, getting your medicines, non-medical meetings or appointments, work, or from getting things that you need?: No   Physical Activity: Inactive (2/27/2025)    Exercise Vital Sign     Days of Exercise per Week: 0 days     Minutes of Exercise per Session: 0 min   Stress: Stress Concern Present (2/27/2025)    [employer] - Occupational Stress Questionnaire     Feeling of Stress : To some extent   Social Connections: Unknown (2/27/2025)    Social Connection and Isolation Panel [NHANES]     Frequency of Communication with Friends and Family: Not on file     Frequency of Social Gatherings with Friends and Family: More than three times a week     Attends [employer] Services: Not on file     Active Member of Clubs or Organizations: Not on file     Attends Club or Organization Meetings: Not on file     Marital Status: Not on file   Interpersonal Safety: Low Risk  (2/27/2025)    Interpersonal Safety     Do you feel physically and emotionally safe where you currently live?: Yes     Within the past 12 months, have you been hit, slapped, kicked or otherwise physically hurt by someone?: No     Within the past 12 months, have you been humiliated or emotionally abused in other ways by your partner or ex-partner?: No   Housing Stability: Low Risk  (2/27/2025)    Housing Stability     Do you have housing? : Yes     Are you worried about losing your housing?: No     Family History   Problem Relation Age of Onset    Dementia Mother     Hypothyroidism Sister     No Known Problems Brother     No Known Problems Brother     No Known Problems Brother     Diabetes Maternal Grandfather     Thyroid Disease  "Paternal Grandmother     Eye Disorder Paternal Grandmother         cadiract    Diabetes Paternal Grandfather     Anxiety Disorder Daughter     Hypertension No family hx of     Cerebrovascular Disease No family hx of     Breast Cancer No family hx of     Cancer - colorectal No family hx of     Prostate Cancer No family hx of     Glaucoma No family hx of     Macular Degeneration No family hx of         Visual Exam:  GEN: NAD,   Head: Normocephalic.  EYES: EOMI  ENT: Oropharynx is clear, Mart class 4+ airway.   Psych: normal mood, normal affect  Snore test:(+)     Labs/Studies:     Lab Results   Component Value Date    TSH 1.65 09/06/2024    TSH 1.77 12/07/2022     Lab Results   Component Value Date    GLC 89 08/05/2024    GLC 91 12/07/2022       Lab Results   Component Value Date    BUN 13.8 08/05/2024    BUN 14.9 12/07/2022    CR 0.96 (H) 08/05/2024    CR 0.89 12/07/2022     Lab Results   Component Value Date    AST 19 08/05/2024    AST 18 12/07/2022    ALT 19 08/05/2024    ALT 15 12/07/2022    GGT 20 08/14/2024    ALKPHOS 167 (H) 08/05/2024    ALKPHOS 149 (H) 12/07/2022    BILITOTAL 0.3 08/05/2024    BILITOTAL 0.3 12/07/2022       Recent Labs   Lab Test 08/05/24  0920 12/07/22  1006    141   POTASSIUM 4.1 4.2   CHLORIDE 104 104   CO2 27 29   ANIONGAP 10 8   GLC 89 91   BUN 13.8 14.9   CR 0.96* 0.89   EUSEBIA 9.5 9.7     Most Recent [facility] Pulmonary Function Testing    FVC-Pred   Date Value Ref Range Status   08/12/2024 3.16 L      FVC-Pre   Date Value Ref Range Status   08/12/2024 3.57 L      FVC-%Pred-Pre   Date Value Ref Range Status   08/12/2024 112 %      FEV1-Pre   Date Value Ref Range Status   08/12/2024 2.66 L      FEV1-%Pred-Pre   Date Value Ref Range Status   08/12/2024 104 %      FEV1FVC-Pred   Date Value Ref Range Status   08/12/2024 80 %      FEV1FVC-Pre   Date Value Ref Range Status   08/12/2024 74 %      No results found for: \"20029\"  FEFMax-Pred   Date Value Ref Range Status   08/12/2024 6.69 " L/sec      FEFMax-Pre   Date Value Ref Range Status   08/12/2024 5.83 L/sec      FEFMax-%Pred-Pre   Date Value Ref Range Status   08/12/2024 87 %      ExpTime-Pre   Date Value Ref Range Status   08/12/2024 7.21 sec      FIFMax-Pre   Date Value Ref Range Status   08/12/2024 2.01 L/sec      FEV1FEV6-Pred   Date Value Ref Range Status   08/12/2024 81 %      FEV1FEV6-Pre   Date Value Ref Range Status   08/12/2024 77 %        FVC, FEV1, and FEV1/FVC are within normal limits.  FIFmax is decreased.  TLC and RV are within normal limits.  The diffusing capacity is normal.  However, the diffusing capacity was not corrected for the patient's hemoglobin.        IMPRESSION:    Normal spirometry.    Normal diffusing capacity.      Normal lung volumes     Specimen Collected: 08/12/24  7:50 AM Last Resulted: 08/15/24  1:17 PM       Patient was strongly advised in AVS to avoid driving, operating any heavy machinery or other hazardous situations while drowsy or sleepy. Patient was counseled on the importance of driving while alert, to pull over if drowsy, or nap before getting into the vehicle if sleepy.     Options for treatment and follow-up care were reviewed with the patient and/or guardian. Patient and/or guardian engaged in the decision making process and verbalized understanding of the options discussed and agreed with the final plan.     The longitudinal plan of care for the diagnosis(es)/condition(s) as documented were addressed during this visit. Due to the added complexity in care, I will continue to support the patient in the subsequent management and with ongoing continuity of care.     Segundo Real DO  Board Certified in Internal Medicine and Sleep Medicine    (Note created with Dragon voice recognition and unintended spelling errors and word substitutions may occur)

## 2025-03-13 NOTE — PATIENT INSTRUCTIONS
What is a Home Sleep Study?    your doctor can give you a portable sleep monitor to use at home, so you don t have to spend the night in the sleep lab. But you should use a portable monitor only if:   ?Your doctor thinks you have a condition that makes you stop breathing for short periods while you are asleep, called  sleep apnea.    ?You do not have other serious medical problems, such as heart disease or lung disease.    Please bring the home sleep study device back to the sleep center as soon as you are finished with it so we can score it.     The cost of care estimate line is [phone] . They are able to give the patient an estimate of the charges and also an estimate of their insurance coverage/patient responsibility.   After your sleep study is performed, please call us at [phone] or [phone] to schedule for a follow up to review the results of the sleep study.    Consider using one tab of low dose melatonin 3 mg or less on the night of the study.    It is completely voluntary.    Do not drive or operate machinery after intake of melatonin.     Due to the pandemic, we have many people waiting in line for sleep studies- this wait list is improving each week.   You should receive a call within 2 weeks from our staff to schedule you for  your test.   Depending on the delay in approval by your insurance carrier, the study will be completed within a few days to 2 weeks of that call.    Please make every effort you can to sleep on your back with one pillow behind your head.    Please also call your insurance company next week to see if your sleep study is approved and find out your co-pay.    Please do not drive drowsy under any circumstances.  If you find yourself in a situation in which you are driving and feeling sleepy, please pull over right away in a safe place and take a quick nap before getting back on the road.

## 2025-03-15 LAB
ALBUMIN SERPL BCG-MCNC: 4.4 G/DL (ref 3.5–5.2)
ALP SERPL-CCNC: 120 U/L (ref 40–150)
ALT SERPL W P-5'-P-CCNC: 17 U/L (ref 0–50)
ANION GAP SERPL CALCULATED.3IONS-SCNC: 11 MMOL/L (ref 7–15)
AST SERPL W P-5'-P-CCNC: 19 U/L (ref 0–45)
BILIRUB SERPL-MCNC: 0.3 MG/DL
BUN SERPL-MCNC: 11.6 MG/DL (ref 6–20)
CALCIUM SERPL-MCNC: 10.1 MG/DL (ref 8.8–10.4)
CHLORIDE SERPL-SCNC: 106 MMOL/L (ref 98–107)
CREAT SERPL-MCNC: 0.83 MG/DL (ref 0.51–0.95)
EGFRCR SERPLBLD CKD-EPI 2021: 82 ML/MIN/1.73M2
FASTING STATUS PATIENT QL REPORTED: YES
GLUCOSE SERPL-MCNC: 81 MG/DL (ref 70–99)
HCO3 SERPL-SCNC: 25 MMOL/L (ref 22–29)
POTASSIUM SERPL-SCNC: 3.9 MMOL/L (ref 3.4–5.3)
PROT SERPL-MCNC: 7 G/DL (ref 6.4–8.3)
SODIUM SERPL-SCNC: 142 MMOL/L (ref 135–145)

## 2025-03-16 PROBLEM — E78.1 HYPERTRIGLYCERIDEMIA: Status: ACTIVE | Noted: 2025-03-16

## 2025-04-09 ENCOUNTER — OFFICE VISIT (OUTPATIENT)
Dept: OTOLARYNGOLOGY | Facility: CLINIC | Age: 55
End: 2025-04-09
Payer: COMMERCIAL

## 2025-04-09 VITALS
HEART RATE: 76 BPM | HEIGHT: 64 IN | SYSTOLIC BLOOD PRESSURE: 109 MMHG | OXYGEN SATURATION: 100 % | WEIGHT: 160 LBS | TEMPERATURE: 97.5 F | DIASTOLIC BLOOD PRESSURE: 66 MMHG | BODY MASS INDEX: 27.31 KG/M2

## 2025-04-09 DIAGNOSIS — J31.0 CHRONIC RHINITIS: Primary | ICD-10-CM

## 2025-04-09 DIAGNOSIS — K21.9 LPRD (LARYNGOPHARYNGEAL REFLUX DISEASE): ICD-10-CM

## 2025-04-09 DIAGNOSIS — J32.9 CHRONIC SINUSITIS, UNSPECIFIED LOCATION: ICD-10-CM

## 2025-04-09 RX ORDER — OMEPRAZOLE 40 MG/1
40 CAPSULE, DELAYED RELEASE ORAL DAILY
Qty: 60 CAPSULE | Refills: 0 | Status: SHIPPED | OUTPATIENT
Start: 2025-04-09

## 2025-04-09 NOTE — NURSING NOTE
"Initial /66   Pulse 76   Temp 97.5  F (36.4  C) (Tympanic)   Ht 1.613 m (5' 3.5\")   Wt 72.6 kg (160 lb)   LMP 01/01/2020 (Exact Date)   SpO2 100%   BMI 27.90 kg/m   Estimated body mass index is 27.9 kg/m  as calculated from the following:    Height as of this encounter: 1.613 m (5' 3.5\").    Weight as of this encounter: 72.6 kg (160 lb). .  Elvie Jo MA    "
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
## 2025-04-09 NOTE — PATIENT INSTRUCTIONS
You were seen by Joey Bailey CNP.  If you have questions or concerns regarding your appointment today, you can reach out to our call center at [phone].  The following has been recommended at your appointment today:    Your nose looks great. Let's try to control your allergies.     Start using the saline nasal spray rinse bottle twice per day.   Then use Astelin twice per day 25-30 minutes after the saline nasal rinse.    Continue with taking Zyrtec, Claritin, or Allegra.   You may use Aquaphor or Vaseline 2-3 times to prevent nasal dryness.   Use a humidifier next to the bed.     NASAL SALINE IRRIGATION INSTRUCTIONS    You will be starting nasal saline irrigations and will need to obtain the following:      - NeilMed Sinus Rinse 8 oz Kit  - Distilled or filtered water   - Normal saline salt packets    Place filtered or distilled water into the NeilMed bottle up to the fill line (DO NOT USE TAP OR WELL WATER).  Place the pre-made salt packet in the 8 oz of saline.  Shake the bottle to suspend into solution.  Lean head forward over a sink or a basin.  Rinse each side of the nose with one-half of the bottle (each squeeze is about one-half of the bottle). Rinse the nose daily.     If you use topical nasal sprays, apply following irrigation.    Video example: https://www.youtube.com/watch?[email or web address]              Laryngeal Reflux Medication Management:     Laryngeal Reflux is when there is swelling around the arytenoids, which hold the vocal cords in place. Reflux symptoms/GERD symptoms, silent reflux (you don't feel the heart burn), and things you eat and drink can cause these symptoms.         1.Start taking Omeprazole 40 mg 30 minutes prior to breakfast. Take this for the next 8 weeks.   2.If symptoms are improve, then start taking apple cider vinegar during week 7. You may take 2 tbs (any brand) or 2 capsules ( [employer] - [place] 1500)  3.Follow up with me in 8-9 weeks.     Adult lifestyle changes to prevent  LPR reviewed     Avoid eating and drinking within two to three hours prior to bedtime    Do not drink alcohol    Eat small meals and slowly    Limit problem foods:      Caffeine    Carbonated drinks     Chocolate    Peppermint    Tomato    Citrus fruits    Fatty and fried foods    Spicy     Lose weight    Quit smoking     Wear loose clothing

## 2025-04-13 DIAGNOSIS — R05.3 CHRONIC COUGH: ICD-10-CM

## 2025-04-15 RX ORDER — FLUTICASONE PROPIONATE AND SALMETEROL 100; 50 UG/1; UG/1
1 POWDER RESPIRATORY (INHALATION) EVERY 12 HOURS
Qty: 14 EACH | Refills: 5 | Status: SHIPPED | OUTPATIENT
Start: 2025-04-15

## 2025-06-05 DIAGNOSIS — K21.9 LPRD (LARYNGOPHARYNGEAL REFLUX DISEASE): ICD-10-CM

## 2025-06-09 ENCOUNTER — MYC MEDICAL ADVICE (OUTPATIENT)
Dept: OTOLARYNGOLOGY | Facility: CLINIC | Age: 55
End: 2025-06-09
Payer: COMMERCIAL

## 2025-06-09 RX ORDER — OMEPRAZOLE 40 MG/1
40 CAPSULE, DELAYED RELEASE ORAL DAILY
Qty: 60 CAPSULE | Refills: 0 | OUTPATIENT
Start: 2025-06-09

## 2025-06-09 NOTE — TELEPHONE ENCOUNTER
"Refill request for omeprazole 40mg capsule: 1 cap daily  Last filled 4/9/25 for a 2 month supply  [facility] refill protocol  Last visit with Leo 4/9/25:  \"...  2. LPRD (laryngopharyngeal reflux disease)  K21.9 omeprazole (PRILOSEC) 40 MG DR capsule   ...   ...We will follow up in 8-9 weeks. Review AVS for patient education. ...\"    No Follow-up appt occurred, none scheduled    Routed to Leo:  Can patient have fill of omeprazole as pended?    Francois [facility]    "

## 2025-06-11 ENCOUNTER — VIRTUAL VISIT (OUTPATIENT)
Dept: FAMILY MEDICINE | Facility: CLINIC | Age: 55
End: 2025-06-11
Payer: COMMERCIAL

## 2025-06-11 DIAGNOSIS — J01.91 ACUTE RECURRENT SINUSITIS, UNSPECIFIED LOCATION: ICD-10-CM

## 2025-06-11 PROCEDURE — 98005 SYNCH AUDIO-VIDEO EST LOW 20: CPT | Performed by: PHYSICIAN ASSISTANT

## 2025-06-11 NOTE — PROGRESS NOTES
Daphnie is a 55 year old who is being evaluated via a billable video visit.    How would you like to obtain your AVS? MyChart  If the video visit is dropped, the invitation should be resent by: Text to cell phone: [phone]  Will anyone else be joining your video visit? No      Assessment & Plan       ICD-10-CM    1. Acute recurrent sinusitis, unspecified location  J01.91 amoxicillin-clavulanate (AUGMENTIN) 875-125 MG tablet          Augmentin BID x7 days. Supportive therapy also discussed. Follow up if symptoms fail to improve or worsen. We also talked about getting her allergies under better control to help prevent infection.         Return in about 1 week (around 6/18/2025), or if symptoms worsen or fail to improve.      Subjective   Daphnie is a 55 year old, presenting for the following health issues:  Sinus Problem        6/11/2025     9:23 AM   Additional Questions   Roomed by Rosalinda over the phone   Accompanied by davion         6/11/2025     9:23 AM   Patient Reported Additional Medications   Patient reports taking the following new medications none     History of Present Illness       Reason for visit:  Sinus infection  Symptom onset:  1-2 weeks ago  Symptoms include:  Thick yellow mucus, congestion, laryngitis, headache, sinus pressure.  Symptom intensity:  Severe  Symptom progression:  Worsening  Had these symptoms before:  Yes  Has tried/received treatment for these symptoms:  Yes  Previous treatment was successful:  Yes  Prior treatment description:  Antibiotics She is missing 1 dose(s) of medications per week.  She is not taking prescribed medications regularly due to remembering to take.        This is different than what she saw ENT for  Usually gets a sinus infection Q6 months  Sxs started 9-10 days ago  Started with congestion and has developed into hoarse voice  Yellow drainage           Review of Systems  Constitutional, HEENT, cardiovascular, pulmonary, gi and gu systems are negative, except as  otherwise noted.      Objective           Vitals:  No vitals were obtained today due to virtual visit.    Physical Exam   GENERAL: alert and no distress  EYES: Eyes grossly normal to inspection.  No discharge or erythema, or obvious scleral/conjunctival abnormalities.  RESP: No audible wheeze, cough, or visible cyanosis.    SKIN: Visible skin clear. No significant rash, abnormal pigmentation or lesions.  NEURO: Cranial nerves grossly intact.  Mentation and speech appropriate for age.  PSYCH: Appropriate affect, tone, and pace of words        Video-Visit Details    Type of service:  Video Visit   Originating Location (pt. Location): Home    Distant Location (provider location):  On-site  Platform used for Video Visit: Juan Diego  Signed Electronically by: Talia Alston PA-C

## 2025-09-04 ENCOUNTER — OFFICE VISIT (OUTPATIENT)
Dept: URGENT CARE | Facility: URGENT CARE | Age: 55
End: 2025-09-04
Payer: COMMERCIAL

## 2025-09-04 VITALS
TEMPERATURE: 97.9 F | HEART RATE: 76 BPM | RESPIRATION RATE: 18 BRPM | SYSTOLIC BLOOD PRESSURE: 140 MMHG | WEIGHT: 157 LBS | OXYGEN SATURATION: 98 % | DIASTOLIC BLOOD PRESSURE: 85 MMHG | BODY MASS INDEX: 27.38 KG/M2

## 2025-09-04 DIAGNOSIS — R13.10 DYSPHAGIA, UNSPECIFIED TYPE: ICD-10-CM

## 2025-09-04 DIAGNOSIS — R13.10 PAIN WITH SWALLOWING: ICD-10-CM

## 2025-09-04 DIAGNOSIS — R11.0 NAUSEA: ICD-10-CM

## 2025-09-04 DIAGNOSIS — S09.90XA INJURY OF HEAD, INITIAL ENCOUNTER: Primary | ICD-10-CM

## 2025-09-04 DIAGNOSIS — R42 DIZZINESS: ICD-10-CM

## 2025-09-04 RX ORDER — OMEPRAZOLE 20 MG/1
20 CAPSULE, DELAYED RELEASE ORAL DAILY
Qty: 90 CAPSULE | Refills: 0 | Status: SHIPPED | OUTPATIENT
Start: 2025-09-04

## 2025-09-04 RX ORDER — ATOGEPANT 30 MG/1
30 TABLET ORAL DAILY
COMMUNITY